# Patient Record
Sex: FEMALE | Race: WHITE | Employment: PART TIME | URBAN - METROPOLITAN AREA
[De-identification: names, ages, dates, MRNs, and addresses within clinical notes are randomized per-mention and may not be internally consistent; named-entity substitution may affect disease eponyms.]

---

## 2017-01-09 DIAGNOSIS — I10 ESSENTIAL HYPERTENSION: ICD-10-CM

## 2017-01-09 DIAGNOSIS — E03.9 ACQUIRED HYPOTHYROIDISM: ICD-10-CM

## 2017-01-09 RX ORDER — LISINOPRIL AND HYDROCHLOROTHIAZIDE 12.5; 1 MG/1; MG/1
TABLET ORAL
Qty: 90 TABLET | Refills: 2 | Status: SHIPPED | OUTPATIENT
Start: 2017-01-09 | End: 2017-07-24 | Stop reason: SDUPTHER

## 2017-01-09 RX ORDER — LEVOTHYROXINE SODIUM 0.1 MG/1
TABLET ORAL
Qty: 90 TABLET | Refills: 2 | Status: SHIPPED | OUTPATIENT
Start: 2017-01-09 | End: 2017-10-20 | Stop reason: SDUPTHER

## 2017-01-23 ENCOUNTER — OFFICE VISIT (OUTPATIENT)
Dept: INTERNAL MEDICINE CLINIC | Age: 62
End: 2017-01-23

## 2017-01-23 VITALS
SYSTOLIC BLOOD PRESSURE: 138 MMHG | WEIGHT: 167 LBS | RESPIRATION RATE: 12 BRPM | BODY MASS INDEX: 30.73 KG/M2 | HEART RATE: 58 BPM | HEIGHT: 62 IN | DIASTOLIC BLOOD PRESSURE: 78 MMHG

## 2017-01-23 DIAGNOSIS — M79.671 PAIN IN BOTH FEET: ICD-10-CM

## 2017-01-23 DIAGNOSIS — M79.672 PAIN IN BOTH FEET: ICD-10-CM

## 2017-01-23 DIAGNOSIS — I10 ESSENTIAL HYPERTENSION: Primary | ICD-10-CM

## 2017-01-23 DIAGNOSIS — E78.5 HYPERLIPIDEMIA, UNSPECIFIED HYPERLIPIDEMIA TYPE: ICD-10-CM

## 2017-01-23 DIAGNOSIS — E03.9 ACQUIRED HYPOTHYROIDISM: ICD-10-CM

## 2017-01-23 DIAGNOSIS — R73.03 PREDIABETES: ICD-10-CM

## 2017-01-23 PROCEDURE — 99214 OFFICE O/P EST MOD 30 MIN: CPT | Performed by: INTERNAL MEDICINE

## 2017-02-13 ENCOUNTER — OFFICE VISIT (OUTPATIENT)
Dept: DERMATOLOGY | Age: 62
End: 2017-02-13

## 2017-02-13 DIAGNOSIS — L73.8 SEBACEOUS GLAND HYPERPLASIA: ICD-10-CM

## 2017-02-13 DIAGNOSIS — L71.9 ROSACEA: Primary | ICD-10-CM

## 2017-02-13 DIAGNOSIS — Z87.2 HISTORY OF ACTINIC KERATOSIS: ICD-10-CM

## 2017-02-13 PROCEDURE — 99213 OFFICE O/P EST LOW 20 MIN: CPT | Performed by: DERMATOLOGY

## 2017-07-24 ENCOUNTER — OFFICE VISIT (OUTPATIENT)
Dept: INTERNAL MEDICINE CLINIC | Age: 62
End: 2017-07-24

## 2017-07-24 VITALS
HEART RATE: 59 BPM | HEIGHT: 62 IN | BODY MASS INDEX: 30.36 KG/M2 | SYSTOLIC BLOOD PRESSURE: 126 MMHG | RESPIRATION RATE: 12 BRPM | WEIGHT: 165 LBS | DIASTOLIC BLOOD PRESSURE: 74 MMHG

## 2017-07-24 DIAGNOSIS — E03.9 ACQUIRED HYPOTHYROIDISM: ICD-10-CM

## 2017-07-24 DIAGNOSIS — I10 ESSENTIAL HYPERTENSION: ICD-10-CM

## 2017-07-24 DIAGNOSIS — M85.80 OSTEOPENIA: ICD-10-CM

## 2017-07-24 DIAGNOSIS — R73.03 PREDIABETES: ICD-10-CM

## 2017-07-24 DIAGNOSIS — E78.5 HYPERLIPIDEMIA, UNSPECIFIED HYPERLIPIDEMIA TYPE: ICD-10-CM

## 2017-07-24 DIAGNOSIS — M25.561 CHRONIC PAIN OF RIGHT KNEE: ICD-10-CM

## 2017-07-24 DIAGNOSIS — I10 ESSENTIAL HYPERTENSION: Primary | ICD-10-CM

## 2017-07-24 DIAGNOSIS — G89.29 CHRONIC PAIN OF RIGHT KNEE: ICD-10-CM

## 2017-07-24 LAB
A/G RATIO: 1.9 (ref 1.1–2.2)
ALBUMIN SERPL-MCNC: 4.5 G/DL (ref 3.4–5)
ALP BLD-CCNC: 62 U/L (ref 40–129)
ALT SERPL-CCNC: 14 U/L (ref 10–40)
ANION GAP SERPL CALCULATED.3IONS-SCNC: 11 MMOL/L (ref 3–16)
AST SERPL-CCNC: 17 U/L (ref 15–37)
BILIRUB SERPL-MCNC: 0.8 MG/DL (ref 0–1)
BUN BLDV-MCNC: 15 MG/DL (ref 7–20)
CALCIUM SERPL-MCNC: 10 MG/DL (ref 8.3–10.6)
CHLORIDE BLD-SCNC: 101 MMOL/L (ref 99–110)
CHOLESTEROL, TOTAL: 218 MG/DL (ref 0–199)
CO2: 27 MMOL/L (ref 21–32)
CREAT SERPL-MCNC: 0.7 MG/DL (ref 0.6–1.2)
ESTIMATED AVERAGE GLUCOSE: 111.2 MG/DL
GFR AFRICAN AMERICAN: >60
GFR NON-AFRICAN AMERICAN: >60
GLOBULIN: 2.4 G/DL
GLUCOSE BLD-MCNC: 96 MG/DL (ref 70–99)
HBA1C MFR BLD: 5.5 %
HCT VFR BLD CALC: 39.5 % (ref 36–48)
HDLC SERPL-MCNC: 62 MG/DL (ref 40–60)
HEMOGLOBIN: 13.7 G/DL (ref 12–16)
LDL CHOLESTEROL CALCULATED: 131 MG/DL
MCH RBC QN AUTO: 31.9 PG (ref 26–34)
MCHC RBC AUTO-ENTMCNC: 34.7 G/DL (ref 31–36)
MCV RBC AUTO: 91.9 FL (ref 80–100)
PDW BLD-RTO: 12.8 % (ref 12.4–15.4)
PLATELET # BLD: 282 K/UL (ref 135–450)
PMV BLD AUTO: 7.9 FL (ref 5–10.5)
POTASSIUM SERPL-SCNC: 5.1 MMOL/L (ref 3.5–5.1)
RBC # BLD: 4.29 M/UL (ref 4–5.2)
SODIUM BLD-SCNC: 139 MMOL/L (ref 136–145)
TOTAL PROTEIN: 6.9 G/DL (ref 6.4–8.2)
TRIGL SERPL-MCNC: 125 MG/DL (ref 0–150)
TSH SERPL DL<=0.05 MIU/L-ACNC: 0.65 UIU/ML (ref 0.27–4.2)
VITAMIN D 25-HYDROXY: 27.8 NG/ML
VLDLC SERPL CALC-MCNC: 25 MG/DL
WBC # BLD: 7.4 K/UL (ref 4–11)

## 2017-07-24 PROCEDURE — 99214 OFFICE O/P EST MOD 30 MIN: CPT | Performed by: INTERNAL MEDICINE

## 2017-07-24 RX ORDER — LISINOPRIL AND HYDROCHLOROTHIAZIDE 12.5; 1 MG/1; MG/1
TABLET ORAL
Qty: 90 TABLET | Refills: 3 | Status: SHIPPED | OUTPATIENT
Start: 2017-07-24 | End: 2018-02-27 | Stop reason: SDUPTHER

## 2017-07-25 DIAGNOSIS — E78.5 HYPERLIPIDEMIA, UNSPECIFIED HYPERLIPIDEMIA TYPE: ICD-10-CM

## 2017-07-25 RX ORDER — MELATONIN
1 DAILY
Qty: 30 TABLET | Refills: 11 | COMMUNITY
Start: 2017-07-25 | End: 2018-02-27 | Stop reason: SDUPTHER

## 2017-09-14 ENCOUNTER — OFFICE VISIT (OUTPATIENT)
Dept: INTERNAL MEDICINE CLINIC | Age: 62
End: 2017-09-14

## 2017-09-14 ENCOUNTER — TELEPHONE (OUTPATIENT)
Dept: INTERNAL MEDICINE CLINIC | Age: 62
End: 2017-09-14

## 2017-09-14 VITALS
WEIGHT: 164 LBS | RESPIRATION RATE: 14 BRPM | HEART RATE: 67 BPM | DIASTOLIC BLOOD PRESSURE: 73 MMHG | BODY MASS INDEX: 30.49 KG/M2 | SYSTOLIC BLOOD PRESSURE: 128 MMHG

## 2017-09-14 DIAGNOSIS — R00.1 BRADYCARDIA: Primary | ICD-10-CM

## 2017-09-14 DIAGNOSIS — E03.9 ACQUIRED HYPOTHYROIDISM: ICD-10-CM

## 2017-09-14 DIAGNOSIS — I10 ESSENTIAL HYPERTENSION: ICD-10-CM

## 2017-09-14 DIAGNOSIS — I46.9 ASYSTOLE (HCC): ICD-10-CM

## 2017-09-14 PROCEDURE — 93000 ELECTROCARDIOGRAM COMPLETE: CPT | Performed by: INTERNAL MEDICINE

## 2017-09-14 PROCEDURE — 99214 OFFICE O/P EST MOD 30 MIN: CPT | Performed by: INTERNAL MEDICINE

## 2017-09-26 ENCOUNTER — HOSPITAL ENCOUNTER (OUTPATIENT)
Dept: NON INVASIVE DIAGNOSTICS | Age: 62
Discharge: HOME | End: 2017-09-26
Attending: INTERNAL MEDICINE | Admitting: INTERNAL MEDICINE

## 2017-09-26 DIAGNOSIS — R00.1 BRADYCARDIA: ICD-10-CM

## 2017-09-26 LAB
LV EF: 58 %
LVEF MODALITY: NORMAL

## 2017-09-29 LAB
ACQUISITION DURATION: NORMAL S
AVERAGE HEART RATE: 63 BPM
EKG DIAGNOSIS: NORMAL
FASTEST SUPRAVENTRICULAR RATE: 161 BPM
HOLTER MAX HEART RATE: 131 BPM
HOOKUP DATE: NORMAL
HOOKUP TIME: NORMAL
LONGEST SUPRAVENTRICULAR RATE: 161 BPM
Lab: NORMAL
MAX HEART RATE TIME/DATE: NORMAL
MIN HEART RATE TIME/DATE: NORMAL
MIN HEART RATE: 44 BPM
NUMBER OF FASTEST SUPRAVENTRICULAR BEATS: 7
NUMBER OF LONGEST SUPRAVENTRICULAR BEATS: 7
NUMBER OF QRS COMPLEXES: NORMAL
NUMBER OF SUPRAVENTRICULAR BEATS IN RUNS: 7
NUMBER OF SUPRAVENTRICULAR COUPLETS: 1
NUMBER OF SUPRAVENTRICULAR ECTOPICS: 38
NUMBER OF SUPRAVENTRICULAR ISOLATED BEATS: 29
NUMBER OF SUPRAVENTRICULAR RUNS: 1
NUMBER OF VENTRICULAR BEATS IN RUNS: 0
NUMBER OF VENTRICULAR BIGEMINAL CYCLES: 0
NUMBER OF VENTRICULAR COUPLETS: 0
NUMBER OF VENTRICULAR ECTOPICS: 3
NUMBER OF VENTRICULAR ISOLATED BEATS: 3
NUMBER OF VENTRICULAR RUNS: 0

## 2017-10-09 ENCOUNTER — OFFICE VISIT (OUTPATIENT)
Dept: CARDIOLOGY CLINIC | Age: 62
End: 2017-10-09

## 2017-10-09 VITALS
HEART RATE: 65 BPM | OXYGEN SATURATION: 95 % | BODY MASS INDEX: 31.04 KG/M2 | SYSTOLIC BLOOD PRESSURE: 165 MMHG | DIASTOLIC BLOOD PRESSURE: 80 MMHG | WEIGHT: 167 LBS

## 2017-10-09 DIAGNOSIS — R00.1 BRADYCARDIA: Primary | ICD-10-CM

## 2017-10-09 DIAGNOSIS — I10 ESSENTIAL HYPERTENSION: ICD-10-CM

## 2017-10-09 DIAGNOSIS — E78.5 HYPERLIPIDEMIA, UNSPECIFIED HYPERLIPIDEMIA TYPE: ICD-10-CM

## 2017-10-09 DIAGNOSIS — I49.1 PAC (PREMATURE ATRIAL CONTRACTION): ICD-10-CM

## 2017-10-09 DIAGNOSIS — I49.3 PVC (PREMATURE VENTRICULAR CONTRACTION): ICD-10-CM

## 2017-10-09 DIAGNOSIS — R94.31 ABNORMAL ECG: ICD-10-CM

## 2017-10-09 DIAGNOSIS — R00.2 PALPITATIONS: ICD-10-CM

## 2017-10-09 PROCEDURE — 99203 OFFICE O/P NEW LOW 30 MIN: CPT | Performed by: INTERNAL MEDICINE

## 2017-10-09 NOTE — PROGRESS NOTES
unsure type    Diabetes Sister      DM2     Review of Systems   General: No fevers, chills, fatigue, or night sweats. No abnormal changes in weight. HEENT: No blurry or decreased vision. No changes in hearing, nasal discharge or sore throat. Cardiovascular: See HPI. No cramping in legs or buttocks when walking. Respiratory: No cough, hemoptysis, or wheezing. No history of asthma. Gastrointestinal: No abdominal pain, hematochezia, melana, or history of GI ulcers. Genito-Urinary: No dysuria or hematuria. No urgency or polyuria. Musculoskeletal: No complaints of joint pain, joint swelling or muscular weakness/soreness. Neurological: No dizziness or headaches. No numbness/tingling, speech problems or weakness. No history of a stroke or TIA. Psychological: No new anxiety or depression  Hematological and Lymphatic: No abnormal bleeding or bruising, blood clots, jaundice. Endocrine: No malaise/lethargy, palpitations, polydipsia/polyuria, temperature intolerance or unexpected weight changes. Skin: No rashes or non-healing ulcers. PE:  Blood pressure (!) 165/80, pulse 65, weight 167 lb (75.8 kg), SpO2 95 %. General (appearance):  No distress. Well developed  Eyes: Anicteric  Ears/Nose/Mouth/Thorat: No cyanosis  CV: RRR. No m/r/g    Respiratory:  Lungs clear  GI: Abd soft, NT, ND. No peritoneal signs  Skin: Warm, dry. No rashes  Neuro/Psych: Alert and oriented x 3. Appropriate behavior  Ext:  No c/c/e  Pulses:  2+ bilaterally in radial and carotid.  No bruits    Lab Results   Component Value Date    WBC 7.4 07/24/2017    HGB 13.7 07/24/2017    HCT 39.5 07/24/2017    MCV 91.9 07/24/2017     07/24/2017       Chemistry        Component Value Date/Time     07/24/2017 0854    K 5.1 07/24/2017 0854     07/24/2017 0854    CO2 27 07/24/2017 0854    BUN 15 07/24/2017 0854    CREATININE 0.7 07/24/2017 0854        Component Value Date/Time    CALCIUM 10.0 07/24/2017 0854    ALKPHOS 62 07/24/2017 0854    AST 17 07/24/2017 0854    ALT 14 07/24/2017 0854    BILITOT 0.8 07/24/2017 0854        Lab Results   Component Value Date    TSH 0.65 07/24/2017     Studies reviewed:  10/2017 Holter. Sinus. HRs . PACs, PVCs. 7-beat run of svt. Palps noted with sinus rhythm, sinus felix, and pac couplet    2013 ecg: Sinus felix, LAD    9/2017 ECG: NSR, LAD, PRWP    9/2017 Echo: Normal EF. Mild AR and TR    A/P:  1. Bradycardia    2. Essential hypertension    3. Hyperlipidemia, unspecified hyperlipidemia type    4. PAC (premature atrial contraction)    5. PVC (premature ventricular contraction)    6. Palpitations    7. Abnormal ECG      Recs:  Risk for CV event < 7.5%. Will hold off on statin for now. Pt has normal HR in office. Has had bradycardic rhythm in past. Does get HRs to 100's as seen on Holter. No symptoms. Do not see indication for pacemaker at this time  F/U in 2 years. Would repeat echo then. Aba Dial MD, [facility]

## 2017-10-20 DIAGNOSIS — E03.9 ACQUIRED HYPOTHYROIDISM: ICD-10-CM

## 2017-10-20 RX ORDER — LEVOTHYROXINE SODIUM 0.1 MG/1
TABLET ORAL
Qty: 30 TABLET | Refills: 1 | Status: SHIPPED | OUTPATIENT
Start: 2017-10-20 | End: 2017-12-21 | Stop reason: SDUPTHER

## 2018-01-25 ENCOUNTER — OFFICE VISIT (OUTPATIENT)
Dept: INTERNAL MEDICINE CLINIC | Age: 63
End: 2018-01-25

## 2018-01-25 VITALS
WEIGHT: 165.2 LBS | DIASTOLIC BLOOD PRESSURE: 80 MMHG | SYSTOLIC BLOOD PRESSURE: 174 MMHG | HEART RATE: 48 BPM | OXYGEN SATURATION: 98 % | BODY MASS INDEX: 31.19 KG/M2 | HEIGHT: 61 IN

## 2018-01-25 DIAGNOSIS — R53.83 FATIGUE, UNSPECIFIED TYPE: ICD-10-CM

## 2018-01-25 DIAGNOSIS — Z78.0 POST-MENOPAUSAL: ICD-10-CM

## 2018-01-25 DIAGNOSIS — M17.0 PRIMARY OSTEOARTHRITIS OF BOTH KNEES: ICD-10-CM

## 2018-01-25 DIAGNOSIS — E03.9 ACQUIRED HYPOTHYROIDISM: ICD-10-CM

## 2018-01-25 DIAGNOSIS — R00.1 BRADYCARDIA: ICD-10-CM

## 2018-01-25 DIAGNOSIS — Z63.79 STRESSFUL LIFE EVENTS AFFECTING FAMILY AND HOUSEHOLD: ICD-10-CM

## 2018-01-25 DIAGNOSIS — I10 ESSENTIAL HYPERTENSION: Primary | ICD-10-CM

## 2018-01-25 DIAGNOSIS — R06.83 SNORING: ICD-10-CM

## 2018-01-25 PROCEDURE — 99215 OFFICE O/P EST HI 40 MIN: CPT | Performed by: INTERNAL MEDICINE

## 2018-01-25 RX ORDER — AMLODIPINE BESYLATE 5 MG/1
5 TABLET ORAL DAILY
Qty: 30 TABLET | Refills: 3 | Status: SHIPPED | OUTPATIENT
Start: 2018-01-25 | End: 2018-05-22 | Stop reason: SDUPTHER

## 2018-01-25 RX ORDER — LEVOTHYROXINE SODIUM 0.1 MG/1
TABLET ORAL
Qty: 30 TABLET | Refills: 5 | Status: SHIPPED | OUTPATIENT
Start: 2018-01-25 | End: 2018-06-23 | Stop reason: SDUPTHER

## 2018-01-25 ASSESSMENT — PATIENT HEALTH QUESTIONNAIRE - PHQ9
1. LITTLE INTEREST OR PLEASURE IN DOING THINGS: 0
2. FEELING DOWN, DEPRESSED OR HOPELESS: 0
SUM OF ALL RESPONSES TO PHQ9 QUESTIONS 1 & 2: 0
SUM OF ALL RESPONSES TO PHQ QUESTIONS 1-9: 0

## 2018-01-25 NOTE — PROGRESS NOTES
[facility]- Internal Medicine  Progress Note  Linda Solares. Jackelyn Cartwright MD, MPH     Assessment/Plan    Jewell Warren was seen today for 6 month follow-up. Diagnoses and all orders for this visit:  Essential hypertension  Systolic elevated- some life stress, not sleeping well, otherwise asymptomatic  Will add amlodpine 5 mg ( pt takes nsaids on occasion- so thghout this better option than increasing Ace inhib)  Pt advised to monitor BP at home 2 x/ week - info provided  CMP,TSH  Consider sleep eval    Acquired hypothyroidism  Sig bradycardia, asymptomatic  Recheck TSH  Cont levothyroxine 100 or adjust PRN    Snoring  advised to schedule sleep eval , jenny in presence of fatigue, daytime somnolence ( may be related to life stress as well)    Primary osteoarthritis of both knees  reviewed HEP- not currently interfering with exercise regimen  declines PT referral today  Will ref to ortho PRN instability symptoms  work on wt loss    Bradycardia  Seen by Dr Sergey Lopes, Holter reviewed, HR increases to 100  Pacemaker not indicated  Advised  follow up in 2 years, no changes  Pt remains asymptomatic    Stressful life events affecting family and household  Encouraged to schedule with Dr Bc Quick for life stress and insomnia            Post-menopausal  -     DEXA Bone Density Axial Skeleton; Future      Discussed medications with patient, who voiced understanding of their use and indications. All questions answered. Return in about 4 weeks (around 2/22/2018) for 30 minute.                  Mari Kenney   YOB: 1955    Date of Visit:  1/25/2018    Allergies   Allergen Reactions    Tussionex Pennkinetic Er [Hydrocod Polst-Cpm Polst Er]     Bactrim [Sulfamethoxazole-Trimethoprim] Rash    Doxycycline Hyclate Nausea And Vomiting    Jose-Tab [Erythromycin] Nausea And Vomiting     Outpatient Prescriptions Marked as Taking for the 1/25/18 encounter (Office Visit) with Jovita Moon MD   Medication Sig Dispense Refill    levothyroxine (SYNTHROID) 100 MCG tablet TAKE ONE TABLET BY MOUTH DAILY 30 tablet 0    Cholecalciferol (VITAMIN D3) 1000 units TABS Take 1 tablet by mouth daily 30 tablet 11    Glucos-Chond-Sterol-Fish Oil (GLUCOSAMINE CHONDROITIN PLUS PO) Take by mouth      lisinopril-hydrochlorothiazide (PRINZIDE;ZESTORETIC) 10-12.5 MG per tablet TAKE ONE TABLET BY MOUTH DAILY 90 tablet 3    aspirin 81 MG tablet Take 1 tablet by mouth daily 30 tablet 11    FISH OIL 1,000 mg by Does not apply route 2 times daily. Chief Complaint   Patient presents with    6 Month Follow-Up   per MA triage  HPI  Pt is here for follow up of chronic medical problems  CC:  Patient presents for follow-up of   1. Essential hypertension    2. Bradycardia    3. Primary osteoarthritis of both knees    4. Acquired hypothyroidism    5. Post-menopausal    6. Snoring    7. Fatigue, unspecified type    8. Stressful life events affecting family and household        Treatment Adherence:   Medication compliance:  compliant all of the time  Diet compliance:  no special diet- BRANDY usually  Weight trend: stable  Current exercise: aerobics 2 time(s) per week and weight training  4 time(s) per week  Barriers: none and sometimes knee pain. Hypertension:  Home blood pressure monitoring: No.  She is not adherent to a low sodium diet. Patient denies chest pain, shortness of breath, headache, lightheadedness, blurred vision, peripheral edema, palpitations, dry cough, orthopnea, PND and fatigue. Antihypertensive medication side effects: no medication side effects noted. Use of agents associated with hypertension: none.           Lab Results   Component Value Date    LABA1C 5.5 07/24/2017    LABA1C 5.4 09/16/2016    LABA1C 5.4 01/07/2016     Lab Results   Component Value Date    CREATININE 0.7 01/25/2018     Lab Results   Component Value Date    ALT 15 01/25/2018    AST 19 01/25/2018     Lab Results   Component Value Date    CHOL 218 (H) 07/24/2017    TRIG 125 07/24/2017    HDL 62 (H) 07/24/2017    LDLCALC 131 (H) 07/24/2017        Hypothyroidism: Recent symptoms: none. She denies weight gain, weight loss, cold intolerance, heat intolerance, hair loss, dry skin, constipation, diarrhea and edema. Patient is  taking her medication consistently on an empty stomach. No results found for: [facility]  Lab Results   Component Value Date    TSH 0.52 01/25/2018    TSH 0.65 07/24/2017    TSH 0.40 09/16/2016         Vit D, 25-Hydroxy (ng/mL)   Date Value   07/24/2017 27.8 (L)       Admits to life stress - youngest daughter may be autistic, depression, hi functioning autistic  Sister, age 79, may have alzheimer's , lives [facility]  Sleep- often awakes in the middle of the night,usually falls back to sleep but sometimes cannot. + snoring. No am HA. + daytime somnolence        Review of Systems    ROS:Comprehensive ROS negative except as per HPI      Vitals:    01/25/18 0802 01/25/18 0826 01/25/18 0827   BP: (!) 141/82 (!) 170/80 (!) 174/80   Site:  Right Arm Left Arm   Position:  Sitting Sitting   Cuff Size:  Large Adult Large Adult   Pulse: (!) 49 (!) 48 (!) 48   SpO2: 98%     Weight: 165 lb 3.2 oz (74.9 kg)     Height: 5' 1\" (1.549 m)       Body mass index is 31.21 kg/m². Wt Readings from Last 3 Encounters:   01/25/18 165 lb 3.2 oz (74.9 kg)   10/09/17 167 lb (75.8 kg)   09/14/17 164 lb (74.4 kg)     BP Readings from Last 3 Encounters:   01/25/18 (!) 174/80   10/09/17 (!) 165/80   09/14/17 128/73        HR 63, goes  up to 78 walking around room     Physical Exam   Constitutional: She is oriented to person, place, and time. She appears well-developed and well-nourished. No distress. HENT:   Head: Normocephalic and atraumatic. Eyes: Conjunctivae are normal. No scleral icterus. Neck: No JVD present. No tracheal deviation present. No thyromegaly present. Cardiovascular: Normal rate, regular rhythm, normal heart sounds and intact distal pulses.

## 2018-01-29 ENCOUNTER — TELEPHONE (OUTPATIENT)
Dept: INTERNAL MEDICINE CLINIC | Age: 63
End: 2018-01-29

## 2018-01-29 NOTE — TELEPHONE ENCOUNTER
I spoke with pt- she is ok to go to gym and walk on treadmill. Denies CP, Boudreaux, exertional dizziness.  Cont amlodipine 5 mg

## 2018-01-29 NOTE — TELEPHONE ENCOUNTER
Patient was seen last Thursday. Started her amlodipine 5 mg Friday. BP elevated to 190/80 after walking up stairs but then went back down to 142/80 shortly after. Better today. Patient would like to know if meds take a few days to kick in. Is afraid to work out since BP went up just from walking upstairs.

## 2018-02-27 ENCOUNTER — OFFICE VISIT (OUTPATIENT)
Dept: INTERNAL MEDICINE CLINIC | Age: 63
End: 2018-02-27

## 2018-02-27 VITALS
HEART RATE: 60 BPM | BODY MASS INDEX: 30.95 KG/M2 | WEIGHT: 163.8 LBS | RESPIRATION RATE: 12 BRPM | DIASTOLIC BLOOD PRESSURE: 69 MMHG | SYSTOLIC BLOOD PRESSURE: 133 MMHG

## 2018-02-27 DIAGNOSIS — I10 ESSENTIAL HYPERTENSION: Primary | ICD-10-CM

## 2018-02-27 PROCEDURE — 99213 OFFICE O/P EST LOW 20 MIN: CPT | Performed by: INTERNAL MEDICINE

## 2018-02-27 RX ORDER — LISINOPRIL AND HYDROCHLOROTHIAZIDE 12.5; 1 MG/1; MG/1
TABLET ORAL
Qty: 90 TABLET | Refills: 1 | Status: SHIPPED | OUTPATIENT
Start: 2018-02-27 | End: 2018-08-28 | Stop reason: SDUPTHER

## 2018-02-27 NOTE — PROGRESS NOTES
PROGRESS NOTE:    Jus Guerra    2/27/2018    Chief Complaint   Patient presents with    Follow-up     former Dr Katy Chavez pt     Hypertension     HPI:    (s)Mily Guerra presents to clinic today with issues noted above. Patient is taking BP medications at home without size effects, BP is being checked at home. She was placed on Norvasc and states it causes her to have flushing, \"face and ears turns red\", but she is tolerating it much better now. Patient denies chest pain, SOB, NVD, FC, rash, malaise, rigor, dizziness/lightheadness, other pertinent ROS was also reviewed. /69 (Site: Left Arm, Position: Sitting, Cuff Size: Large Adult)   Pulse 60   Resp 12   Wt 163 lb 12.8 oz (74.3 kg)   BMI 30.95 kg/m²   Body mass index is 30.95 kg/m². Physical Exam:    Gen: Patient appears well groomed, well appearing  HEAD: Atraumatic, normocephalic,   Eyes: PERRLA, EOMI   Neck: supple, no thyroid nodule appreciated, no JVD  Chest: Clear to auscultation SETH, unlabored breathing, normal expansion  Heart: Regular rate, regular rhythm, no murmur, no rub  Abdomen: Non-tender, non-distended, bowel sounds present x3  Extremities: no edema, distal pulses intact  Patient was alert and oriented to person, place and time    Jaimee King was seen today for follow-up and hypertension. Diagnoses and all orders for this visit:    Essential hypertension  Controlled overall, continue meds, follow renal function   -     lisinopril-hydrochlorothiazide (PRINZIDE;ZESTORETIC) 10-12.5 MG per tablet; TAKE ONE TABLET BY MOUTH DAILY    Preventive medicine: patient has had indicated immunizations. No orders of the defined types were placed in this encounter. Prior to Admission medications    Medication Sig Start Date End Date Taking?  Authorizing Provider   lisinopril-hydrochlorothiazide (PRINZIDE;ZESTORETIC) 10-12.5 MG per tablet TAKE ONE TABLET BY MOUTH DAILY 2/27/18  Yes Kyra Noland, DO   amLODIPine (NORVASC) 5 MG tablet Take 1 tablet by mouth daily 1/25/18  Yes Kindra Faye MD   levothyroxine (SYNTHROID) 100 MCG tablet TAKE ONE TABLET BY MOUTH DAILY 1/25/18  Yes Kindra Faye MD   Glucos-Chond-Sterol-Fish Oil (GLUCOSAMINE CHONDROITIN PLUS PO) Take by mouth   Yes Historical Provider, MD   aspirin 81 MG tablet Take 1 tablet by mouth daily 1/7/16  Yes Kindra Faye MD   Multiple Vitamins-Minerals (MULTIVITAMIN PO) Take  by mouth daily. Yes Historical Provider, MD   FISH OIL 1,000 mg by Does not apply route 2 times daily. Yes Historical Provider, MD   Calcium Carbonate-Vit D-Min (CALTRATE 600+D PLUS) 600-400 MG-UNIT TABS Take  by mouth. Indications: one by mouth twice a day   Yes Historical Provider, MD   Glucosamine-Chondroit-Vit C-Mn (GLUCOSAMINE 1500 COMPLEX PO) Take 1 capsule by mouth daily    Historical Provider, MD       Past Medical History:   Diagnosis Date    Bloodgood disease 4/24/2008    Endometrial polyp 12/2014    Hyperlipidemia 2/13/2013    Hypertension     pregnancy induced    Hypothyroidism     Lateral epicondylitis 7/1/2015    Plantar fasciitis of right foot 7/24/2013       Past Surgical History:   Procedure Laterality Date    BREAST BIOPSY  1987    Left    FINE NEEDLE ASPIRATION  1983    Left Breast    MOUTH SURGERY  1990s    jaw surgery    OVARIAN CYST SURGERY Left 12-27-13    TONSILLECTOMY         Social History   Substance Use Topics    Smoking status: Never Smoker    Smokeless tobacco: Never Used      Comment: 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  Alcohol use 0.0 oz/week      Comment: I drink only occasionally, less than once a month.        Family History   Problem Relation Age of Onset    Coronary Art Dis Father     Heart Failure Father      MI age 61    Cancer Paternal Aunt      Breast Cancer and Colon Cancer    Cancer Paternal Uncle      Colon Cancer    Cancer Maternal Grandmother      Breast Cancer    Cancer Paternal Grandmother      Breast    Osteoarthritis Maternal Grandfather      arthritis- unsure type    Diabetes Sister      DM2

## 2018-02-27 NOTE — PATIENT INSTRUCTIONS
servings of grains each day. A serving is 1 slice of bread, 1 ounce of dry cereal, or ½ cup of cooked rice, pasta, or cooked cereal. Try to choose whole-grain products as much as possible. · Limit lean meat, poultry, and fish to 2 servings each day. A serving is 3 ounces, about the size of a deck of cards. · Eat 4 to 5 servings of nuts, seeds, and legumes (cooked dried beans, lentils, and split peas) each week. A serving is 1/3 cup of nuts, 2 tablespoons of seeds, or ½ cup of cooked beans or peas. · Limit fats and oils to 2 to 3 servings each day. A serving is 1 teaspoon of vegetable oil or 2 tablespoons of salad dressing. · Limit sweets and added sugars to 5 servings or less a week. A serving is 1 tablespoon jelly or jam, ½ cup sorbet, or 1 cup of lemonade. · Eat less than 2,300 milligrams (mg) of sodium a day. If you limit your sodium to 1,500 mg a day, you can lower your blood pressure even more. Tips for success  · Start small. Do not try to make dramatic changes to your diet all at once. You might feel that you are missing out on your favorite foods and then be more likely to not follow the plan. Make small changes, and stick with them. Once those changes become habit, add a few more changes. · Try some of the following:  ¨ Make it a goal to eat a fruit or vegetable at every meal and at snacks. This will make it easy to get the recommended amount of fruits and vegetables each day. ¨ Try yogurt topped with fruit and nuts for a snack or healthy dessert. ¨ Add lettuce, tomato, cucumber, and onion to sandwiches. ¨ Combine a ready-made pizza crust with low-fat mozzarella cheese and lots of vegetable toppings. Try using tomatoes, squash, spinach, broccoli, carrots, cauliflower, and onions. ¨ Have a variety of cut-up vegetables with a low-fat dip as an appetizer instead of chips and dip. ¨ Sprinkle sunflower seeds or chopped almonds over salads.  Or try adding chopped walnuts or almonds to cooked

## 2018-03-15 ENCOUNTER — INITIAL CONSULT (OUTPATIENT)
Dept: PULMONOLOGY | Age: 63
End: 2018-03-15

## 2018-03-15 VITALS
OXYGEN SATURATION: 99 % | BODY MASS INDEX: 31.15 KG/M2 | WEIGHT: 165 LBS | HEART RATE: 68 BPM | DIASTOLIC BLOOD PRESSURE: 70 MMHG | HEIGHT: 61 IN | SYSTOLIC BLOOD PRESSURE: 132 MMHG

## 2018-03-15 DIAGNOSIS — R06.83 SNORING: ICD-10-CM

## 2018-03-15 DIAGNOSIS — G47.10 HYPERSOMNIA: Primary | ICD-10-CM

## 2018-03-15 DIAGNOSIS — I10 ESSENTIAL HYPERTENSION: Chronic | ICD-10-CM

## 2018-03-15 DIAGNOSIS — E66.9 NON MORBID OBESITY, UNSPECIFIED OBESITY TYPE: Chronic | ICD-10-CM

## 2018-03-15 DIAGNOSIS — E03.9 ACQUIRED HYPOTHYROIDISM: Chronic | ICD-10-CM

## 2018-03-15 PROCEDURE — 99244 OFF/OP CNSLTJ NEW/EST MOD 40: CPT | Performed by: INTERNAL MEDICINE

## 2018-03-15 ASSESSMENT — SLEEP AND FATIGUE QUESTIONNAIRES
HOW LIKELY ARE YOU TO NOD OFF OR FALL ASLEEP WHILE WATCHING TV: 3
HOW LIKELY ARE YOU TO NOD OFF OR FALL ASLEEP WHILE LYING DOWN TO REST IN THE AFTERNOON WHEN CIRCUMSTANCES PERMIT: 3
HOW LIKELY ARE YOU TO NOD OFF OR FALL ASLEEP IN A CAR, WHILE STOPPED FOR A FEW MINUTES IN TRAFFIC: 0
HOW LIKELY ARE YOU TO NOD OFF OR FALL ASLEEP WHILE SITTING AND TALKING TO SOMEONE: 1
NECK CIRCUMFERENCE (INCHES): 15
HOW LIKELY ARE YOU TO NOD OFF OR FALL ASLEEP WHEN YOU ARE A PASSENGER IN A CAR FOR AN HOUR WITHOUT A BREAK: 2
HOW LIKELY ARE YOU TO NOD OFF OR FALL ASLEEP WHILE SITTING INACTIVE IN A PUBLIC PLACE: 1
HOW LIKELY ARE YOU TO NOD OFF OR FALL ASLEEP WHILE SITTING AND READING: 3
ESS TOTAL SCORE: 16
HOW LIKELY ARE YOU TO NOD OFF OR FALL ASLEEP WHILE SITTING QUIETLY AFTER LUNCH WITHOUT ALCOHOL: 3

## 2018-03-15 ASSESSMENT — ENCOUNTER SYMPTOMS
VOMITING: 0
APNEA: 0
CHOKING: 0
ABDOMINAL DISTENTION: 0
NAUSEA: 0
ABDOMINAL PAIN: 0
RHINORRHEA: 0
PHOTOPHOBIA: 0
CHEST TIGHTNESS: 0
SHORTNESS OF BREATH: 0
EYE PAIN: 0
ALLERGIC/IMMUNOLOGIC NEGATIVE: 1

## 2018-03-29 ENCOUNTER — HOSPITAL ENCOUNTER (OUTPATIENT)
Dept: SLEEP MEDICINE | Age: 63
Discharge: HOME | End: 2018-03-30
Attending: INTERNAL MEDICINE | Admitting: INTERNAL MEDICINE

## 2018-03-29 DIAGNOSIS — R06.83 SNORING: ICD-10-CM

## 2018-03-29 DIAGNOSIS — G47.10 HYPERSOMNIA: ICD-10-CM

## 2018-03-29 PROCEDURE — 95806 SLEEP STUDY UNATT&RESP EFFT: CPT | Performed by: INTERNAL MEDICINE

## 2018-04-04 ENCOUNTER — TELEPHONE (OUTPATIENT)
Dept: SLEEP MEDICINE | Age: 63
End: 2018-04-04

## 2018-04-23 ENCOUNTER — TELEPHONE (OUTPATIENT)
Dept: SLEEP MEDICINE | Age: 63
End: 2018-04-23

## 2018-04-24 ENCOUNTER — TELEPHONE (OUTPATIENT)
Dept: SLEEP MEDICINE | Age: 63
End: 2018-04-24

## 2018-05-22 DIAGNOSIS — I10 ESSENTIAL HYPERTENSION: ICD-10-CM

## 2018-05-23 RX ORDER — AMLODIPINE BESYLATE 5 MG/1
TABLET ORAL
Qty: 30 TABLET | Refills: 2 | Status: SHIPPED | OUTPATIENT
Start: 2018-05-23 | End: 2018-06-25 | Stop reason: SDUPTHER

## 2018-05-29 ENCOUNTER — OFFICE VISIT (OUTPATIENT)
Dept: INTERNAL MEDICINE CLINIC | Age: 63
End: 2018-05-29

## 2018-05-29 VITALS
SYSTOLIC BLOOD PRESSURE: 122 MMHG | DIASTOLIC BLOOD PRESSURE: 74 MMHG | BODY MASS INDEX: 31.33 KG/M2 | HEART RATE: 67 BPM | OXYGEN SATURATION: 97 % | WEIGHT: 165.8 LBS

## 2018-05-29 DIAGNOSIS — I10 ESSENTIAL HYPERTENSION: ICD-10-CM

## 2018-05-29 DIAGNOSIS — Z23 NEED FOR PROPHYLACTIC VACCINATION AND INOCULATION AGAINST VARICELLA: Primary | ICD-10-CM

## 2018-05-29 DIAGNOSIS — E03.9 ACQUIRED HYPOTHYROIDISM: ICD-10-CM

## 2018-05-29 DIAGNOSIS — E55.9 HYPOVITAMINOSIS D: ICD-10-CM

## 2018-05-29 DIAGNOSIS — E78.5 HYPERLIPIDEMIA, UNSPECIFIED HYPERLIPIDEMIA TYPE: ICD-10-CM

## 2018-05-29 PROCEDURE — 99214 OFFICE O/P EST MOD 30 MIN: CPT | Performed by: INTERNAL MEDICINE

## 2018-06-23 DIAGNOSIS — E03.9 ACQUIRED HYPOTHYROIDISM: ICD-10-CM

## 2018-06-23 DIAGNOSIS — I10 ESSENTIAL HYPERTENSION: ICD-10-CM

## 2018-06-25 RX ORDER — AMLODIPINE BESYLATE 5 MG/1
TABLET ORAL
Qty: 30 TABLET | Refills: 2 | Status: SHIPPED | OUTPATIENT
Start: 2018-06-25 | End: 2018-11-15 | Stop reason: SDUPTHER

## 2018-06-25 RX ORDER — LEVOTHYROXINE SODIUM 0.1 MG/1
TABLET ORAL
Qty: 30 TABLET | Refills: 4 | Status: SHIPPED | OUTPATIENT
Start: 2018-06-25 | End: 2019-01-26 | Stop reason: SDUPTHER

## 2018-07-03 ENCOUNTER — OFFICE VISIT (OUTPATIENT)
Dept: SLEEP MEDICINE | Age: 63
End: 2018-07-03

## 2018-07-03 VITALS
HEART RATE: 60 BPM | BODY MASS INDEX: 31.45 KG/M2 | OXYGEN SATURATION: 98 % | WEIGHT: 166.6 LBS | HEIGHT: 61 IN | DIASTOLIC BLOOD PRESSURE: 72 MMHG | SYSTOLIC BLOOD PRESSURE: 134 MMHG

## 2018-07-03 DIAGNOSIS — E03.9 ACQUIRED HYPOTHYROIDISM: Chronic | ICD-10-CM

## 2018-07-03 DIAGNOSIS — E66.9 CLASS 1 OBESITY WITH BODY MASS INDEX (BMI) OF 31.0 TO 31.9 IN ADULT, UNSPECIFIED OBESITY TYPE, UNSPECIFIED WHETHER SERIOUS COMORBIDITY PRESENT: Chronic | ICD-10-CM

## 2018-07-03 DIAGNOSIS — G47.33 OBSTRUCTIVE SLEEP APNEA SYNDROME: Primary | ICD-10-CM

## 2018-07-03 DIAGNOSIS — I10 ESSENTIAL HYPERTENSION: Chronic | ICD-10-CM

## 2018-07-03 PROCEDURE — 99214 OFFICE O/P EST MOD 30 MIN: CPT | Performed by: NURSE PRACTITIONER

## 2018-07-03 ASSESSMENT — SLEEP AND FATIGUE QUESTIONNAIRES
HOW LIKELY ARE YOU TO NOD OFF OR FALL ASLEEP WHILE SITTING QUIETLY AFTER LUNCH WITHOUT ALCOHOL: 2
HOW LIKELY ARE YOU TO NOD OFF OR FALL ASLEEP WHILE SITTING AND READING: 3
HOW LIKELY ARE YOU TO NOD OFF OR FALL ASLEEP WHILE LYING DOWN TO REST IN THE AFTERNOON WHEN CIRCUMSTANCES PERMIT: 3
HOW LIKELY ARE YOU TO NOD OFF OR FALL ASLEEP WHEN YOU ARE A PASSENGER IN A CAR FOR AN HOUR WITHOUT A BREAK: 1
HOW LIKELY ARE YOU TO NOD OFF OR FALL ASLEEP WHILE SITTING AND TALKING TO SOMEONE: 1
HOW LIKELY ARE YOU TO NOD OFF OR FALL ASLEEP WHILE WATCHING TV: 2
HOW LIKELY ARE YOU TO NOD OFF OR FALL ASLEEP WHILE SITTING INACTIVE IN A PUBLIC PLACE: 1
ESS TOTAL SCORE: 13
HOW LIKELY ARE YOU TO NOD OFF OR FALL ASLEEP IN A CAR, WHILE STOPPED FOR A FEW MINUTES IN TRAFFIC: 0

## 2018-07-03 ASSESSMENT — ENCOUNTER SYMPTOMS
APNEA: 0
COUGH: 0
RHINORRHEA: 0
ABDOMINAL DISTENTION: 0
ABDOMINAL PAIN: 0
SHORTNESS OF BREATH: 0
SINUS PRESSURE: 0

## 2018-07-03 NOTE — PROGRESS NOTES
Ramo Bautista MD, FAASM, [facility]  Christian Joe, MSN, RN, CNP [address]  3rd Floor, Sleep [place], [address] [phone]   [address] [place] [address]  [address] [place] [address] Dr [address] . [address]oh[address] Persauda [address] [phone]       [facility] SLEEP MEDICINE    Subjective:     Patient ID: Leah Basilio is a 61 y.o. female. Chief Complaint   Patient presents with    Sleep Apnea       HPI:      Had study Insurance mandated HST done on 3/29/2018 which showed an RHI - 28.0/hr with Low SaO2 - 84% and time below 90% of 129min. This is consistent with moderate RAMANDEEP (327.23)    Machine Present in office today: Yes     Machine Modem/Download Info:  Compliance (hours/night): 6.5 hrs/night  Download AHI (/hour): 4 /HR  Average CPAP Pressure : 8.9 cmH2O           APAP - Settings  Pressure Min: 6 cmH2O  Pressure Max: 16 cmH2O                 Comfort Settings  Humidity Level (0-8): 3  Heated Tubing (Yes/No): Yes  Flex/EPR (0-3): 3 PAP Mask  Mask Type: Nasal mask  Mask Model: wisp  Mask Size: lg       Warren - Total score: 13    Follow-up :     Last Visit : March 2018    Per patient the listed Co-morbidities are well controlled and stable at this time. Subjective Health Changes: None       Follow-up :      Over Night Oximetry: [] Yes  [] No  [x] NA   Using O2: [] Yes  [] No  [x] NA   Patient is compliant with the machine  [x] Yes  [] No   Feeling rested when using the machine   [x] Yes  [] No     Pressure is comfortable with inspiration and expiration  [x] Yes  [] No     Noticed changes in pressure   [] Yes  [] No  [x] NA   Mask is fitting well  [x] Yes  [] No   Noting Mask Air Leak  [x] Yes  [] No   Having painful Aerophagia  [] Yes  [x] No   Nocturia   0-1  per night.    Having  HA upon waking  [] Yes  [x] No   Dry mouth upon waking   [] Yes  [x] No   Congestion upon waking   [] Yes  [x] No    Nose Bleeds  [] Yes  [x] No   Using Sleep Aides    [] NA   Understands how to change humidification and/or tubing temperature for comfort while at home  [x] Yes  [] No     Difficulties falling asleep  [] Yes  [x] No   Difficulties staying asleep  [] Yes  [x] No   Approximate time to bed  10-11pm   Approximate wake time  5am   Taking Naps  occasional   If taking naps usual length  <60 min    If taking naps using the machine  [] Yes  [x] No  [] NA   Drowsy when driving  [] Yes  [x] No     Does patient carry a DOT/CDL  [] Yes  [x] No     Does patient carry FAA/Pilots License   [] Yes  [x] No      Any concerns noted with the machine at this time  [] Yes  [x] No         Review of Systems   Constitutional: Negative for appetite change, chills, fatigue and fever. HENT: Negative for congestion, nosebleeds, rhinorrhea and sinus pressure. Respiratory: Negative for apnea, cough and shortness of breath. Cardiovascular: Negative for chest pain and palpitations. Gastrointestinal: Negative for abdominal distention and abdominal pain. Neurological: Negative for dizziness and headaches. 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  Alcohol use 0.0 oz/week      Comment: I drink only occasionally, less than once a month.  Drug use: No    Sexual activity: Yes     Partners: Male     Other Topics Concern    Not on file     Social History Narrative    Lives in [place] with . 3 daughters ages 22,19, 29       Prior to Admission medications    Medication Sig Start Date End Date Taking?  Authorizing Provider   levothyroxine (SYNTHROID) 100 MCG tablet TAKE ONE TABLET BY MOUTH DAILY 6/25/18  Yes Nicolas Novak, DO   amLODIPine (NORVASC) 5 MG tablet TAKE ONE TABLET BY MOUTH DAILY 6/25/18  Yes Verlee Sicard, DO   lisinopril-hydrochlorothiazide (PRINZIDE;ZESTORETIC) 10-12.5 MG per tablet TAKE ONE TABLET BY MOUTH DAILY 2/27/18  Yes Verlee Sicard, DO   Glucosamine-Chondroit-Vit C-Mn (GLUCOSAMINE 1500 COMPLEX PO) Take 1 capsule by mouth daily   Yes Historical Provider, MD   Glucos-Chond-Sterol-Fish Oil (GLUCOSAMINE CHONDROITIN PLUS PO) Take by mouth   Yes Historical Provider, MD   aspirin 81 MG tablet Take 1 tablet by mouth daily 1/7/16  Yes Vilma Benavidez MD   Multiple Vitamins-Minerals (MULTIVITAMIN PO) Take  by mouth daily. Yes Historical Provider, MD   FISH OIL 1,000 mg by Does not apply route 2 times daily. Yes Historical Provider, MD   Calcium Carbonate-Vit D-Min (CALTRATE 600+D PLUS) 600-400 MG-UNIT TABS Take  by mouth.  Indications: one by mouth twice a day   Yes Historical Provider, MD       Allergies as of 07/03/2018 - Review Complete 07/03/2018   Allergen Reaction Noted    Tussionex pennkinetic er [hydrocod polst-cpm polst er]  08/04/2010    Bactrim [sulfamethoxazole-trimethoprim] Rash 05/06/2014    Doxycycline hyclate Nausea And Vomiting     Jose-tab [erythromycin] Nausea And Vomiting 08/04/2010       Patient Active Problem List   Diagnosis    Essential hypertension    Acquired hypothyroidism    Hyperlipidemia    Knee pain, right    Skin lesion of face    Foot pain    DJD (degenerative joint disease) of knee    Menopause    Osteopenia    Prediabetes    Endometrial polyp    Goiter    Bradycardia    Asystole ([address]    PAC (premature atrial contraction)    PVC (premature ventricular contraction)    Palpitations    Abnormal ECG    Snoring    Primary osteoarthritis of both knees    Stressful life events affecting family and household    Obstructive sleep apnea syndrome    Hypovitaminosis D       Past Medical History:   Diagnosis Date    Bloodgood disease 4/24/2008    Endometrial polyp 12/2014    Hyperlipidemia 2/13/2013    Hypertension pregnancy induced    Hypothyroidism     Lateral epicondylitis 7/1/2015    Plantar fasciitis of right foot 7/24/2013       Past Surgical History:   Procedure Laterality Date    BREAST BIOPSY  1987    Left    FINE NEEDLE ASPIRATION  1983    Left Breast    MOUTH SURGERY  1990s    jaw surgery    OVARIAN CYST SURGERY Left 12-27-13    TONSILLECTOMY         Family History   Problem Relation Age of Onset    Coronary Art Dis Father     Heart Failure Father         MI age 58    Cancer Paternal Aunt         Breast Cancer and Colon Cancer    Cancer Paternal Uncle         Colon Cancer    Cancer Maternal Grandmother         Breast Cancer    Cancer Paternal Grandmother         Breast    Osteoarthritis Maternal Grandfather         arthritis- unsure type    Diabetes Sister         DM2       Vitals:  Weight BMI   Wt Readings from Last 3 Encounters:   07/03/18 166 lb 9.6 oz (75.6 kg)   05/29/18 165 lb 12.8 oz (75.2 kg)   03/15/18 165 lb (74.8 kg)    Body mass index is 31.48 kg/m². BP HR SaO2   BP Readings from Last 3 Encounters:   07/03/18 134/72   05/29/18 122/74   03/15/18 132/70    Pulse Readings from Last 3 Encounters:   07/03/18 60   05/29/18 67   03/15/18 68    SpO2 Readings from Last 3 Encounters:   07/03/18 98%   05/29/18 97%   03/15/18 99%        Assessment:     Visit Diagnoses and Associated Orders     Obstructive sleep apnea syndrome    -  Primary    new diagnosis with treatment          Essential hypertension   (Stable)           Acquired hypothyroidism   (Stable)           Class 1 obesity with body mass index (BMI) of 31.0 to 31.9 in adult, unspecified obesity type, unspecified whether serious comorbidity present   (Stable)                 The primary encounter diagnosis was Obstructive sleep apnea syndrome.  Diagnoses of Essential hypertension, Acquired hypothyroidism, and Class 1 obesity with body mass index (BMI) of 31.0 to 31.9 in adult, unspecified obesity type, unspecified whether serious

## 2018-07-26 ENCOUNTER — TELEPHONE (OUTPATIENT)
Dept: SLEEP MEDICINE | Age: 63
End: 2018-07-26

## 2018-08-28 DIAGNOSIS — I10 ESSENTIAL HYPERTENSION: ICD-10-CM

## 2018-08-28 RX ORDER — LISINOPRIL AND HYDROCHLOROTHIAZIDE 12.5; 1 MG/1; MG/1
TABLET ORAL
Qty: 30 TABLET | Refills: 2 | Status: SHIPPED | OUTPATIENT
Start: 2018-08-28 | End: 2019-05-26 | Stop reason: SDUPTHER

## 2018-09-12 ENCOUNTER — OFFICE VISIT (OUTPATIENT)
Dept: INTERNAL MEDICINE CLINIC | Age: 63
End: 2018-09-12

## 2018-09-12 VITALS
HEART RATE: 68 BPM | HEIGHT: 62 IN | SYSTOLIC BLOOD PRESSURE: 124 MMHG | RESPIRATION RATE: 16 BRPM | BODY MASS INDEX: 30.36 KG/M2 | TEMPERATURE: 98.4 F | WEIGHT: 165 LBS | DIASTOLIC BLOOD PRESSURE: 70 MMHG

## 2018-09-12 DIAGNOSIS — H65.03 BILATERAL ACUTE SEROUS OTITIS MEDIA, RECURRENCE NOT SPECIFIED: ICD-10-CM

## 2018-09-12 DIAGNOSIS — J06.9 VIRAL UPPER RESPIRATORY TRACT INFECTION: Primary | ICD-10-CM

## 2018-09-12 DIAGNOSIS — J45.991 COUGH VARIANT ASTHMA: ICD-10-CM

## 2018-09-12 PROCEDURE — 99213 OFFICE O/P EST LOW 20 MIN: CPT | Performed by: INTERNAL MEDICINE

## 2018-09-12 RX ORDER — PREDNISONE 10 MG/1
20 TABLET ORAL DAILY
Qty: 10 TABLET | Refills: 0 | Status: SHIPPED | OUTPATIENT
Start: 2018-09-12 | End: 2018-09-17

## 2018-09-12 RX ORDER — BENZONATATE 100 MG/1
100 CAPSULE ORAL 3 TIMES DAILY PRN
Qty: 30 CAPSULE | Refills: 0 | Status: SHIPPED | OUTPATIENT
Start: 2018-09-12 | End: 2018-09-19

## 2018-09-12 RX ORDER — CETIRIZINE HYDROCHLORIDE 10 MG/1
10 TABLET ORAL DAILY
Qty: 30 TABLET | Refills: 0 | Status: SHIPPED | OUTPATIENT
Start: 2018-09-12 | End: 2019-02-21 | Stop reason: SDUPTHER

## 2018-09-12 NOTE — PATIENT INSTRUCTIONS
Patient Education        Learning About Nebulizers  What is a nebulizer? A nebulizer is a tool that delivers liquid medicine as a fine mist. You breathe in the medicine through a mouthpiece or face mask. This sends the medicine directly to your airways and lungs. You breathe in the medicine for a few minutes. What is it used for? A nebulizer may be used to treat respiratory problems. These include asthma and chronic obstructive pulmonary disease (COPD). If you have asthma, it can be used with daily controller medicines or with quick-relief medicine during an attack or flare-up. A nebulizer can make inhaling medicines easier. It may be very helpful if it is hard for you to breathe or use an inhaler. How do you use a nebulizer? 1. Put the medicine into the medicine container. Be sure to measure the right amount. 2. Make sure that the container is connected to the mouthpiece or face mask. 3. Turn on the air compressor. 4. Take deep, slow breaths through the mouthpiece or mask. Hold each breath for about 2 seconds. 5. Continue breathing until the medicine is gone from the container. When the medicine is gone, there will be no more mist coming out. This may take about 10 minutes. 6. Shake the container to make sure you get all the medicine. Where can you learn more? Go to [email or web address]://REPP.PageStitch. org and sign in to your [employer] account. Enter Z877 in the [facility] box to learn more about \"Learning About Nebulizers. \"     If you do not have an account, please click on the \"Sign Up Now\" link. Current as of: December 6, 2017  Content Version: 11.7  © 7631-4578 [employer]. Care instructions adapted under license by [facility] ([facility]). If you have questions about a medical condition or this instruction, always ask your healthcare professional. [address] any warranty or liability for your use of this information.

## 2018-09-12 NOTE — PROGRESS NOTES
Progress Note:    Sofi Edwards    9/12/2018    Chief Complaint   Patient presents with    Cough     Started three days ago with cough, fever and chills, sore throat and nasal drainage. Mr(s)Mily Edwards has had 4 days of cough, fever, sore throat  Progression: sore throat better but drainage and cough worse  Quality: dry cough, congestion  Radiation: general and chest and ENT  Severity: moderate, cough is not keeping her up but it's annoying, no limiting  Timing: sudden onset  Moderation: taken ibuprofen and ASA without much improvement. Patient denies chest pain, SOB, NVD, rash, malaise, rigor, dizziness/lightheadness, other pertinent ROS was also reviewed. /70 (Site: Right Upper Arm, Position: Sitting, Cuff Size: Medium Adult)   Pulse 68 Comment: Regular. Temp 98.4 °F (36.9 °C) (Oral)   Resp 16   Ht 5' 1.5\" (1.562 m)   Wt 165 lb (74.8 kg)   BMI 30.67 kg/m²   Body mass index is 30.67 kg/m². Gen: Patient appears well groomed, well appearing  HEAD: Atraumatic, normocephalic,   Eyes: PERRLA, EOMI   Neck: supple, no thyroid nodule appreciated, no JVD  Chest: Clear to auscultation SETH, unlabored breathing, normal expansion  Heart: Regular rate, regular rhythm, no murmur, no rub  Abdomen: Non-tender, non-distended, bowel sounds present x3  Extremities: no edema, distal pulses intact  Patient was alert and oriented to person, place and time  ENT: External auricles intact, canal clear left, canal clear right, TM bulge left, TM bulge right, nasal turbinates edematous appearing, post pharynx erythematous, bilateral ear effusions noted    Bette Given was seen today for cough.     Diagnoses and all orders for this visit:    Viral upper respiratory tract infection  Non-toxic state, post-viral reactive airway cough, given Symbicort trial in office some relief, also take as needed tessalon perles, zyrtec    Bilateral serous otitis media (eustachian tube dysfunction)  Prednisone course,

## 2018-11-01 ENCOUNTER — TELEPHONE (OUTPATIENT)
Dept: CARDIOLOGY CLINIC | Age: 63
End: 2018-11-01

## 2018-11-15 DIAGNOSIS — I10 ESSENTIAL HYPERTENSION: ICD-10-CM

## 2018-11-15 RX ORDER — AMLODIPINE BESYLATE 5 MG/1
TABLET ORAL
Qty: 30 TABLET | Refills: 1 | Status: SHIPPED | OUTPATIENT
Start: 2018-11-15 | End: 2019-01-14 | Stop reason: SDUPTHER

## 2018-11-27 ENCOUNTER — OFFICE VISIT (OUTPATIENT)
Dept: INTERNAL MEDICINE CLINIC | Age: 63
End: 2018-11-27
Payer: COMMERCIAL

## 2018-11-27 VITALS
WEIGHT: 166 LBS | HEART RATE: 64 BPM | BODY MASS INDEX: 31.34 KG/M2 | RESPIRATION RATE: 16 BRPM | SYSTOLIC BLOOD PRESSURE: 134 MMHG | HEIGHT: 61 IN | DIASTOLIC BLOOD PRESSURE: 86 MMHG

## 2018-11-27 DIAGNOSIS — E03.9 ACQUIRED HYPOTHYROIDISM: ICD-10-CM

## 2018-11-27 DIAGNOSIS — M85.88 OSTEOPENIA OF LUMBAR SPINE: ICD-10-CM

## 2018-11-27 DIAGNOSIS — Z78.0 POSTMENOPAUSAL: ICD-10-CM

## 2018-11-27 DIAGNOSIS — E78.5 HYPERLIPIDEMIA, UNSPECIFIED HYPERLIPIDEMIA TYPE: ICD-10-CM

## 2018-11-27 DIAGNOSIS — M17.0 PRIMARY OSTEOARTHRITIS OF BOTH KNEES: Primary | ICD-10-CM

## 2018-11-27 DIAGNOSIS — I10 ESSENTIAL HYPERTENSION: ICD-10-CM

## 2018-11-27 PROCEDURE — 99214 OFFICE O/P EST MOD 30 MIN: CPT | Performed by: INTERNAL MEDICINE

## 2018-11-27 NOTE — PROGRESS NOTES
laxity/instability of R knee, will refer to sports med for considerations of synvisk vs. Surgical intervention  -     [facility]    Essential hypertension  Controlled overall, continue meds, follow renal function   -     COMPREHENSIVE METABOLIC PANEL; Future    Acquired hypothyroidism  Patient is taking her meds as prescribed, with at least 30 min prior to other foods/meds, not missing doses. -     TSH with Reflex; Future    Hyperlipidemia, unspecified hyperlipidemia type  Offset by high HDL, electively not on statin, diet mods    Postmenopausal  -     DEXA BONE DENSITY AXIAL SKELETON; Future    Osteopenia of lumbar spine  -     DEXA BONE DENSITY AXIAL SKELETON; Future    Preventive medicine: patient has agreed to indicated immunizations. Fluzone today, Cscope due 2019, PAP with GYN annually, needs Shingrix. Orders Placed This Encounter   Procedures    DEXA BONE DENSITY AXIAL SKELETON    COMPREHENSIVE METABOLIC PANEL    TSH with Reflex    [facility]       Prior to Admission medications    Medication Sig Start Date End Date Taking? Authorizing Provider   amLODIPine (NORVASC) 5 MG tablet TAKE ONE TABLET BY MOUTH DAILY 11/15/18  Yes Elray Clutter, DO   lisinopril-hydrochlorothiazide (PRINZIDE;ZESTORETIC) 10-12.5 MG per tablet TAKE ONE TABLET BY MOUTH DAILY 8/28/18  Yes Elray Clutter, DO   levothyroxine (SYNTHROID) 100 MCG tablet TAKE ONE TABLET BY MOUTH DAILY 6/25/18  Yes Elray Clutter, DO   Glucosamine-Chondroit-Vit C-Mn (GLUCOSAMINE 1500 COMPLEX PO) Take 1 capsule by mouth daily   Yes Historical Provider, MD   Multiple Vitamins-Minerals (MULTIVITAMIN PO) Take  by mouth daily. Yes Historical Provider, MD   FISH OIL 1,000 mg by Does not apply route 2 times daily. Yes Historical Provider, MD   Calcium Carbonate-Vit D-Min (CALTRATE 600+D PLUS) 600-400 MG-UNIT TABS Take  by mouth.  Indications: one by mouth twice a day

## 2018-12-19 ENCOUNTER — OFFICE VISIT (OUTPATIENT)
Dept: ORTHOPEDIC SURGERY | Age: 63
End: 2018-12-19
Payer: COMMERCIAL

## 2018-12-19 VITALS
BODY MASS INDEX: 31.34 KG/M2 | WEIGHT: 166.01 LBS | DIASTOLIC BLOOD PRESSURE: 73 MMHG | SYSTOLIC BLOOD PRESSURE: 137 MMHG | HEART RATE: 67 BPM | HEIGHT: 61 IN

## 2018-12-19 DIAGNOSIS — M17.12 ARTHRITIS OF LEFT KNEE: ICD-10-CM

## 2018-12-19 DIAGNOSIS — M25.562 LEFT KNEE PAIN, UNSPECIFIED CHRONICITY: ICD-10-CM

## 2018-12-19 DIAGNOSIS — M17.11 ARTHRITIS OF RIGHT KNEE: ICD-10-CM

## 2018-12-19 DIAGNOSIS — M25.561 RIGHT KNEE PAIN, UNSPECIFIED CHRONICITY: Primary | ICD-10-CM

## 2018-12-19 PROCEDURE — 99204 OFFICE O/P NEW MOD 45 MIN: CPT | Performed by: ORTHOPAEDIC SURGERY

## 2018-12-19 PROCEDURE — 20610 DRAIN/INJ JOINT/BURSA W/O US: CPT | Performed by: ORTHOPAEDIC SURGERY

## 2018-12-27 ENCOUNTER — HOSPITAL ENCOUNTER (OUTPATIENT)
Dept: PHYSICAL THERAPY | Age: 63
Setting detail: THERAPIES SERIES
Discharge: HOME | End: 2018-12-27
Payer: COMMERCIAL

## 2018-12-27 PROCEDURE — G8978 MOBILITY CURRENT STATUS: HCPCS | Performed by: PHYSICAL THERAPIST

## 2018-12-27 PROCEDURE — 97110 THERAPEUTIC EXERCISES: CPT | Performed by: PHYSICAL THERAPIST

## 2018-12-27 PROCEDURE — G8979 MOBILITY GOAL STATUS: HCPCS | Performed by: PHYSICAL THERAPIST

## 2018-12-27 PROCEDURE — 97161 PT EVAL LOW COMPLEX 20 MIN: CPT | Performed by: PHYSICAL THERAPIST

## 2018-12-27 NOTE — FLOWSHEET NOTE
diagnosis, prognosis and expectations for rehab  -pt. Provided with written and illustrated instructions for HEP  -all pt. Questions were answered  -discussed exercises and timing for gym    Therapeutic Exercise and NMR EXR  [x] (69066) Provided verbal/tactile cueing for activities related to strengthening, flexibility, endurance, ROM for improvements in LE, proximal hip, and core control with self care, mobility, lifting, ambulation.  [] (59940) Provided verbal/tactile cueing for activities related to improving balance, coordination, kinesthetic sense, posture, motor skill, proprioception  to assist with LE, proximal hip, and core control in self care, mobility, lifting, ambulation and eccentric single leg control.      NMR and Therapeutic Activities:    [] (15506 or 25755) Provided verbal/tactile cueing for activities related to improving balance, coordination, kinesthetic sense, posture, motor skill, proprioception and motor activation to allow for proper function of core, proximal hip and LE with self care and ADLs  [] (95886) Gait Re-education- Provided training and instruction to the patient for proper LE, core and proximal hip recruitment and positioning and eccentric body weight control with ambulation re-education including up and down stairs     Home Exercise Program:    [x] (44373) Reviewed/Progressed HEP activities related to strengthening, flexibility, endurance, ROM of core, proximal hip and LE for functional self-care, mobility, lifting and ambulation/stair navigation   [] (87675)Reviewed/Progressed HEP activities related to improving balance, coordination, kinesthetic sense, posture, motor skill, proprioception of core, proximal hip and LE for self care, mobility, lifting, and ambulation/stair navigation      Manual Treatments:  PROM / STM / Oscillations-Mobs:  G-I, II, III, IV (PA's, Inf., Post.)  [] (54160) Provided manual therapy to mobilize LE, proximal hip and/or LS spine soft tissue/joints for without increased symptoms or restriction. 5. Patient will transition to independent gym program that is pain free in order to continue to progress strength. Progression Towards Functional goals:  [] Patient is progressing as expected towards functional goals listed. [] Progression is slowed due to complexities listed. [] Progression has been slowed due to co-morbidities.   [x] Plan just implemented, too soon to assess goals progression  [] Other:     Persisting Functional Limitations/Impairments:  []Sitting [x]Standing   [x]Walking [x]Squatting/bending    [x]Stairs [x]ADL's    [x]Transfers []Reaching  [x]Housework [x]Job related tasks  []Driving [x]Sports/Recreation   []Other:    ASSESSMENT:  See eval  Treatment/Activity Tolerance:  [x] Patient tolerated treatment well [] Patient limited by fatique  [] Patient limited by pain  [] Patient limited by other medical complications  [] Other:     Prognosis: [x] Good [] Fair  [] Poor    Patient Requires Follow-up: [x] Yes  [] No    Return to Play:    [x]  N/A   []  Stage 1: Intro to Strength   []  Stage 2: Return to Run and Strength   []  Stage 3: Return to Jump and Strength   []  Stage 4: Dynamic Strength and Agility   []  Stage 5: Sport Specific Training     []  Ready to Return to Play, Meets All Above Stages   []  Not Ready for Return to Sports   Comments:            PLAN: See eval  [] Continue per plan of care [] Alter current plan (see comments)  [x] Plan of care initiated [] Hold pending MD visit [] Discharge    Electronically signed by: Shannan Keller PT, DPT

## 2018-12-27 NOTE — PLAN OF CARE
periods/distances/surfaces   [x]Reduced ability to ascend/descend stairs   [x]Reduced ability to run, hop, cut or jump   []other:    Participation Restrictions   []Reduced participation in self care activities   [x]Reduced participation in home management activities   [x]Reduced participation in work activities   [x]Reduced participation in social activities. [x]Reduced participation in sport/recreation activities. Classification :    []Signs/symptoms consistent with post-surgical status including decreased ROM, strength and function.    []Signs/symptoms consistent with joint sprain/strain   []Signs/symptoms consistent with patella-femoral syndrome   [x]Signs/symptoms consistent with knee OA/hip OA   []Signs/symptoms consistent with internal derangement of knee/Hip   []Signs/symptoms consistent with functional hip weakness/NMR control      []Signs/symptoms consistent with tendinitis/tendinosis    []signs/symptoms consistent with pathology which may benefit from Dry needling      []other:      Prognosis/Rehab Potential:      []Excellent   [x]Good    []Fair   []Poor    Tolerance of evaluation/treatment:    []Excellent   [x]Good    []Fair   []Poor    Physical Therapy Evaluation Complexity Justification  [x] A history of present problem with:  [] no personal factors and/or comorbidities that impact the plan of care;  [x]1-2 personal factors and/or comorbidities that impact the plan of care  []3 personal factors and/or comorbidities that impact the plan of care  [x] An examination of body systems using standardized tests and measures addressing any of the following: body structures and functions (impairments), activity limitations, and/or participation restrictions;:  [] a total of 1-2 or more elements   [] a total of 3 or more elements   [x] a total of 4 or more elements   [x] A clinical presentation with:  [x] stable and/or uncomplicated characteristics   [] evolving clinical presentation with changing characteristics  [] unstable and unpredictable characteristics;   [x] Clinical decision making of [x] low, [] moderate, [] high complexity using standardized patient assessment instrument and/or measurable assessment of functional outcome. [x] EVAL (LOW) 03554 (typically 15 minutes face-to-face)  [] EVAL (MOD) 96252 (typically 30 minutes face-to-face)  [] EVAL (HIGH) 60348 (typically 45 minutes face-to-face)  [] RE-EVAL     PLAN:   Frequency/Duration:  1 days per week for 4-6 Weeks:  Interventions:  [x]  Therapeutic exercise including: strength training, ROM, for Lower extremity and core   [x]  NMR activation and proprioception for LE, Glutes and Core   [x]  Manual therapy as indicated for LE, Hip and spine to include: Dry Needling/IASTM, STM, PROM, Gr I-IV mobilizations, manipulation. [x] Modalities as needed that may include: thermal agents, E-stim, Biofeedback, US, iontophoresis as indicated  [x] Patient education on joint protection, postural re-education, activity modification, progression of HEP. HEP instruction: Pt. Provided with written and illustrated instructions for home program. Pt to perform exercises 1-2x day f/b ice 15 min. (see scanned forms)    GOALS:  Patient stated goal: decrease pain    Therapist goals for Patient:   Short Term Goals: To be achieved in: 2 weeks  1. Independent in HEP and progression per patient tolerance, in order to prevent re-injury. 2. Patient will have a decrease in pain to facilitate improvement in movement, function, and ADLs as indicated by Functional Deficits. Long Term Goals: To be achieved in: 4-6 weeks  1. Disability index score of 25% or less for the LEFS to assist with reaching prior level of function. 2. Patient will demonstrate increased LE flexibility to max potential to allow for proper joint functioning as indicated by patients Functional Deficits.    3. Patient will demonstrate an increase in Strength to at least 4+/5 throughout  as well as good

## 2019-01-02 ENCOUNTER — HOSPITAL ENCOUNTER (OUTPATIENT)
Dept: PHYSICAL THERAPY | Age: 64
Setting detail: THERAPIES SERIES
Discharge: HOME | End: 2019-01-02
Payer: COMMERCIAL

## 2019-01-02 PROCEDURE — 97110 THERAPEUTIC EXERCISES: CPT | Performed by: PHYSICAL THERAPIST

## 2019-01-07 ENCOUNTER — OFFICE VISIT (OUTPATIENT)
Dept: PULMONOLOGY | Age: 64
End: 2019-01-07
Payer: COMMERCIAL

## 2019-01-07 VITALS
WEIGHT: 166 LBS | OXYGEN SATURATION: 97 % | DIASTOLIC BLOOD PRESSURE: 80 MMHG | BODY MASS INDEX: 30.55 KG/M2 | HEIGHT: 62 IN | SYSTOLIC BLOOD PRESSURE: 138 MMHG | HEART RATE: 57 BPM

## 2019-01-07 DIAGNOSIS — G47.33 OBSTRUCTIVE SLEEP APNEA SYNDROME: Primary | ICD-10-CM

## 2019-01-07 DIAGNOSIS — I10 ESSENTIAL HYPERTENSION: ICD-10-CM

## 2019-01-07 DIAGNOSIS — R00.1 BRADYCARDIA: ICD-10-CM

## 2019-01-07 PROCEDURE — 99214 OFFICE O/P EST MOD 30 MIN: CPT | Performed by: NURSE PRACTITIONER

## 2019-01-07 ASSESSMENT — SLEEP AND FATIGUE QUESTIONNAIRES
HOW LIKELY ARE YOU TO NOD OFF OR FALL ASLEEP WHILE SITTING QUIETLY AFTER LUNCH WITHOUT ALCOHOL: 2
HOW LIKELY ARE YOU TO NOD OFF OR FALL ASLEEP WHEN YOU ARE A PASSENGER IN A CAR FOR AN HOUR WITHOUT A BREAK: 1
HOW LIKELY ARE YOU TO NOD OFF OR FALL ASLEEP WHILE SITTING AND TALKING TO SOMEONE: 0
HOW LIKELY ARE YOU TO NOD OFF OR FALL ASLEEP IN A CAR, WHILE STOPPED FOR A FEW MINUTES IN TRAFFIC: 0
ESS TOTAL SCORE: 9
HOW LIKELY ARE YOU TO NOD OFF OR FALL ASLEEP WHILE SITTING AND READING: 1
HOW LIKELY ARE YOU TO NOD OFF OR FALL ASLEEP WHILE SITTING INACTIVE IN A PUBLIC PLACE: 1
HOW LIKELY ARE YOU TO NOD OFF OR FALL ASLEEP WHILE WATCHING TV: 2
HOW LIKELY ARE YOU TO NOD OFF OR FALL ASLEEP WHILE LYING DOWN TO REST IN THE AFTERNOON WHEN CIRCUMSTANCES PERMIT: 2

## 2019-01-07 ASSESSMENT — ENCOUNTER SYMPTOMS
APNEA: 0
COUGH: 0
EYE REDNESS: 0
RHINORRHEA: 0
SINUS PRESSURE: 0
ABDOMINAL PAIN: 0
ABDOMINAL DISTENTION: 0
SHORTNESS OF BREATH: 0
EYE PAIN: 0

## 2019-01-08 ENCOUNTER — APPOINTMENT (OUTPATIENT)
Dept: PHYSICAL THERAPY | Age: 64
End: 2019-01-08
Payer: COMMERCIAL

## 2019-01-09 ENCOUNTER — OFFICE VISIT (OUTPATIENT)
Dept: ORTHOPEDIC SURGERY | Age: 64
End: 2019-01-09
Payer: COMMERCIAL

## 2019-01-09 VITALS — HEIGHT: 61 IN | WEIGHT: 166.01 LBS | BODY MASS INDEX: 31.34 KG/M2

## 2019-01-09 DIAGNOSIS — M17.11 ARTHRITIS OF RIGHT KNEE: ICD-10-CM

## 2019-01-09 DIAGNOSIS — M17.12 ARTHRITIS OF LEFT KNEE: Primary | ICD-10-CM

## 2019-01-09 PROCEDURE — 99213 OFFICE O/P EST LOW 20 MIN: CPT | Performed by: ORTHOPAEDIC SURGERY

## 2019-01-11 ENCOUNTER — HOSPITAL ENCOUNTER (OUTPATIENT)
Dept: PHYSICAL THERAPY | Age: 64
Setting detail: THERAPIES SERIES
Discharge: HOME | End: 2019-01-11
Payer: COMMERCIAL

## 2019-01-11 PROCEDURE — G8979 MOBILITY GOAL STATUS: HCPCS | Performed by: PHYSICAL THERAPIST

## 2019-01-11 PROCEDURE — G8978 MOBILITY CURRENT STATUS: HCPCS | Performed by: PHYSICAL THERAPIST

## 2019-01-11 PROCEDURE — 97110 THERAPEUTIC EXERCISES: CPT | Performed by: PHYSICAL THERAPIST

## 2019-01-11 PROCEDURE — G8980 MOBILITY D/C STATUS: HCPCS | Performed by: PHYSICAL THERAPIST

## 2019-01-26 DIAGNOSIS — E03.9 ACQUIRED HYPOTHYROIDISM: ICD-10-CM

## 2019-01-29 RX ORDER — LEVOTHYROXINE SODIUM 0.1 MG/1
TABLET ORAL
Qty: 30 TABLET | Refills: 3 | Status: SHIPPED | OUTPATIENT
Start: 2019-01-29 | End: 2019-05-29 | Stop reason: SDUPTHER

## 2019-02-21 ENCOUNTER — OFFICE VISIT (OUTPATIENT)
Dept: INTERNAL MEDICINE CLINIC | Age: 64
End: 2019-02-21
Payer: COMMERCIAL

## 2019-02-21 VITALS
TEMPERATURE: 97.4 F | SYSTOLIC BLOOD PRESSURE: 136 MMHG | HEART RATE: 76 BPM | BODY MASS INDEX: 31.05 KG/M2 | WEIGHT: 167 LBS | RESPIRATION RATE: 16 BRPM | DIASTOLIC BLOOD PRESSURE: 70 MMHG

## 2019-02-21 DIAGNOSIS — H01.00B BLEPHARITIS OF UPPER AND LOWER EYELIDS OF BOTH EYES, UNSPECIFIED TYPE: Primary | ICD-10-CM

## 2019-02-21 DIAGNOSIS — H01.00A BLEPHARITIS OF UPPER AND LOWER EYELIDS OF BOTH EYES, UNSPECIFIED TYPE: Primary | ICD-10-CM

## 2019-02-21 PROCEDURE — 99213 OFFICE O/P EST LOW 20 MIN: CPT | Performed by: INTERNAL MEDICINE

## 2019-02-21 RX ORDER — CETIRIZINE HYDROCHLORIDE 10 MG/1
10 TABLET ORAL DAILY
Qty: 30 TABLET | Refills: 0 | Status: SHIPPED | OUTPATIENT
Start: 2019-02-21 | End: 2019-07-10

## 2019-02-21 RX ORDER — NEOMYCIN SULFATE, POLYMYXIN B SULFATE AND DEXAMETHASONE 3.5; 10000; 1 MG/ML; [USP'U]/ML; MG/ML
1 SUSPENSION/ DROPS OPHTHALMIC 3 TIMES DAILY
Qty: 1 BOTTLE | Refills: 0 | Status: SHIPPED | OUTPATIENT
Start: 2019-02-21 | End: 2019-02-26

## 2019-02-21 RX ORDER — PREDNISONE 10 MG/1
20 TABLET ORAL DAILY
Qty: 10 TABLET | Refills: 0 | Status: SHIPPED | OUTPATIENT
Start: 2019-02-21 | End: 2019-02-26

## 2019-02-21 ASSESSMENT — PATIENT HEALTH QUESTIONNAIRE - PHQ9
SUM OF ALL RESPONSES TO PHQ QUESTIONS 1-9: 0
1. LITTLE INTEREST OR PLEASURE IN DOING THINGS: 0
SUM OF ALL RESPONSES TO PHQ QUESTIONS 1-9: 0
SUM OF ALL RESPONSES TO PHQ9 QUESTIONS 1 & 2: 0
2. FEELING DOWN, DEPRESSED OR HOPELESS: 0

## 2019-03-12 ENCOUNTER — TELEPHONE (OUTPATIENT)
Dept: INTERNAL MEDICINE CLINIC | Age: 64
End: 2019-03-12

## 2019-03-14 ENCOUNTER — TELEPHONE (OUTPATIENT)
Dept: INTERNAL MEDICINE CLINIC | Age: 64
End: 2019-03-14

## 2019-03-14 DIAGNOSIS — H01.00A BLEPHARITIS OF UPPER AND LOWER EYELIDS OF BOTH EYES, UNSPECIFIED TYPE: Primary | ICD-10-CM

## 2019-03-14 DIAGNOSIS — H01.00B BLEPHARITIS OF UPPER AND LOWER EYELIDS OF BOTH EYES, UNSPECIFIED TYPE: Primary | ICD-10-CM

## 2019-03-14 RX ORDER — AZELASTINE HYDROCHLORIDE 0.5 MG/ML
1 SOLUTION/ DROPS OPHTHALMIC 2 TIMES DAILY
Qty: 1 BOTTLE | Refills: 0 | Status: SHIPPED | OUTPATIENT
Start: 2019-03-14 | End: 2019-04-13

## 2019-04-18 DIAGNOSIS — I10 ESSENTIAL HYPERTENSION: ICD-10-CM

## 2019-04-19 RX ORDER — AMLODIPINE BESYLATE 5 MG/1
TABLET ORAL
Qty: 30 TABLET | Refills: 1 | Status: SHIPPED | OUTPATIENT
Start: 2019-04-19 | End: 2019-06-20 | Stop reason: SDUPTHER

## 2019-05-21 ENCOUNTER — OFFICE VISIT (OUTPATIENT)
Dept: ORTHOPEDIC SURGERY | Age: 64
End: 2019-05-21
Payer: COMMERCIAL

## 2019-05-21 VITALS
WEIGHT: 162 LBS | HEIGHT: 61 IN | BODY MASS INDEX: 30.58 KG/M2 | HEART RATE: 57 BPM | DIASTOLIC BLOOD PRESSURE: 82 MMHG | SYSTOLIC BLOOD PRESSURE: 131 MMHG

## 2019-05-21 DIAGNOSIS — M17.11 OSTEOARTHRITIS OF RIGHT KNEE, UNSPECIFIED OSTEOARTHRITIS TYPE: Primary | ICD-10-CM

## 2019-05-21 PROCEDURE — 99213 OFFICE O/P EST LOW 20 MIN: CPT | Performed by: ORTHOPAEDIC SURGERY

## 2019-05-21 ASSESSMENT — ENCOUNTER SYMPTOMS
ALLERGIC/IMMUNOLOGIC NEGATIVE: 1
RESPIRATORY NEGATIVE: 1
EYES NEGATIVE: 1
GASTROINTESTINAL NEGATIVE: 1

## 2019-05-21 NOTE — PROGRESS NOTES
[facility]  History and Physical  Knee Pain    Date:  2019    Name:  Marcy Zazueta  Address:  [place] [address]    :  1955      Age:   59 y.o.    SSN:  [id number]      Medical Record Number:  [id number]    Reason for Visit:    New Patient (bilateral knee )      HPI:   Marcy Zazueta is a healthy and well appearing 59y.o. year old female who presents to our office today complaining of  bilateral knee pain. She reports a large right is worse than the left. She's been having pain for many months and is described as sharp and aching pain. She had no prior injury. She describes ice pick like pain on the inside of her knees especially with standing or walking long distances. She also has problems going up and down stairs. She was seen by Dr. Helen Pedroza before who offered her injections. Injection on her left knee lasted her a good amount of time however the one on the right only lasted her about a week. She is at a point where she is no longer wanting to deal with her pain related to knee arthritis. She is requesting something be done more definitive. She is failed conservative treatment measures including therapy, anti-inflammatories, activity modification, and injections. Review of Systems:  A 14 point review of systems available in the scanned medical record under the media tab, as documented by the patient. The review is negative with the exception of those things mentioned in the HPI and Past Medical History.       Past History:  Past Medical History:   Diagnosis Date    Bloodgood disease 2008    Endometrial polyp 2014    Hyperlipidemia 2013    Hypertension     pregnancy induced    Hypothyroidism     Lateral epicondylitis 2015    Plantar fasciitis of right foot 2013     Past Surgical History:   Procedure Laterality Date    BREAST BIOPSY  1987    Left    FINE NEEDLE ASPIRATION  1983 Left Breast    MOUTH SURGERY  1990s    jaw surgery    OVARIAN CYST SURGERY Left 12-27-13    TONSILLECTOMY       Current Outpatient Medications on File Prior to Visit   Medication Sig Dispense Refill    amLODIPine (NORVASC) 5 MG tablet TAKE ONE TABLET BY MOUTH DAILY 30 tablet 1    cetirizine (ZYRTEC) 10 MG tablet Take 1 tablet by mouth daily 30 tablet 0    levothyroxine (SYNTHROID) 100 MCG tablet TAKE ONE TABLET BY MOUTH DAILY 30 tablet 3    lisinopril-hydrochlorothiazide (PRINZIDE;ZESTORETIC) 10-12.5 MG per tablet TAKE ONE TABLET BY MOUTH DAILY 30 tablet 2    Glucosamine-Chondroit-Vit C-Mn (GLUCOSAMINE 1500 COMPLEX PO) Take 1 capsule by mouth daily      aspirin 81 MG tablet Take 1 tablet by mouth daily 30 tablet 11    Multiple Vitamins-Minerals (MULTIVITAMIN PO) Take  by mouth daily.  FISH OIL 1,000 mg by Does not apply route 2 times daily.  Calcium Carbonate-Vit D-Min (CALTRATE 600+D PLUS) 600-400 MG-UNIT TABS Take  by mouth. Indications: one by mouth twice a day       No current facility-administered medications on file prior to visit. 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 Substance and Sexual Activity    Alcohol use: Yes     Alcohol/week: 0.0 oz     Comment: I drink only occasionally, less than once a month.     Drug use: No    Sexual activity: Yes     Partners: Male   Lifestyle    Physical activity: Days per week: Not on file     Minutes per session: Not on file    Stress: Not on file   Relationships    Social connections:     Talks on phone: Not on file     Gets together: Not on file     Attends Advent service: Not on file     Active member of club or organization: Not on file     Attends meetings of clubs or organizations: Not on file     Relationship status: Not on file    Intimate partner violence:     Fear of current or ex partner: Not on file     Emotionally abused: Not on file     Physically abused: Not on file     Forced sexual activity: Not on file   Other Topics Concern    Not on file   Social History Narrative    Lives in [place] with . 3 daughters ages 22,19, 29     Family History   Problem Relation Age of Onset    Coronary Art Dis Father     Heart Failure Father         MI age 61    Cancer Paternal Aunt         Breast Cancer and Colon Cancer    Cancer Paternal Uncle         Colon Cancer    Cancer Maternal Grandmother         Breast Cancer    Cancer Paternal Grandmother         Breast    Osteoarthritis Maternal Grandfather         arthritis- unsure type    Diabetes Sister         DM2       Current Medications:    Current Outpatient Medications   Medication Sig Dispense Refill    amLODIPine (NORVASC) 5 MG tablet TAKE ONE TABLET BY MOUTH DAILY 30 tablet 1    cetirizine (ZYRTEC) 10 MG tablet Take 1 tablet by mouth daily 30 tablet 0    levothyroxine (SYNTHROID) 100 MCG tablet TAKE ONE TABLET BY MOUTH DAILY 30 tablet 3    lisinopril-hydrochlorothiazide (PRINZIDE;ZESTORETIC) 10-12.5 MG per tablet TAKE ONE TABLET BY MOUTH DAILY 30 tablet 2    Glucosamine-Chondroit-Vit C-Mn (GLUCOSAMINE 1500 COMPLEX PO) Take 1 capsule by mouth daily      aspirin 81 MG tablet Take 1 tablet by mouth daily 30 tablet 11    Multiple Vitamins-Minerals (MULTIVITAMIN PO) Take  by mouth daily.  FISH OIL 1,000 mg by Does not apply route 2 times daily.       Calcium Carbonate-Vit D-Min Protein 11/27/2018 7.6     Alb 11/27/2018 4.9     Albumin/Globulin Ratio 11/27/2018 1.8     Total Bilirubin 11/27/2018 0.7     Alkaline Phosphatase 11/27/2018 73     ALT 11/27/2018 17     AST 11/27/2018 20     Globulin 11/27/2018 2.7     TSH 11/27/2018 0.41     Vit D, 25-Hydroxy 11/27/2018 43.0       No results found for this or any previous visit (from the past 24 hour(s)). Radiographic:  3 xray of the bilateral  knees taken previously and reviewed today in clinic reveal no fractures, dislocations, visible tumors, or signs of acute trauma. She does have varus alignment of her lower extremities and near bone-on-bone osteoarthritis on the left and complete bone-on-bone changes on the right. She has moderate patellofemoral changes in the right knee however only mild in the left. Radiographic Impression: Osteoarthritis bilateral knee    Assessment:  1. Bilateral knee osteoarthritis    Plan:   Mrs. Marley Capps is a very pleasant 66-year-old female who seen today in clinic for her bilateral knees. She reports the right is worse than the left. She has failed conservative treatment measures for her arthritis including activity modification, anti-inflammatories, physical therapy, and injections. She reports today that she is ready for a total knee replacement on the right. We discussed with her today the risks and benefits of the surgery and the expectations around the time of surgery and postoperative period. She would need to get medical clearance prior to her surgery and attend her joint class. We would also need an MRI for sizing of her implants prior to surgery. Her questions were sought and answered today. We spent extensive amount of time talking about surgical procedure and outcomes. We will see her back sometime in late June or July for her surgery. Stefani Greenberg IV, D.O.    Clinical Fellow, [address] [place] [address]  Date: 5/21/2019      The encounter with Camille Rose was supervised by Dr. Emeli Pitts, who personally examined the patient and reviewed the plan. This dictation was performed with a verbal recognition program (DRAGON) and it was checked for errors. It is possible that there are still dictated errors within this office note. If so, please bring any errors to my attention for an addendum. All efforts were made to ensure that this office note is accurate. This dictation was performed with a verbal recognition program (DRAGON) and it was checked for errors. It is possible that there are still dictated errors within this office note. If so, please bring any errors to my attention for an addendum. All efforts were made to ensure that this office note is accurate. Supervising Physician Attestation:  I, Dr. Angy Pritchard, personally performed the services described in this documentation as scribed above, and it is both accurate and complete and I agree with all pertinent clinical information. I personally interviewed the patient and performed a physical examination and medical decision making. I discussed the patient's condition and treatment options and have  reviewed and agree with the past medical, family and social history unless otherwise noted. All of the patient's questions were answered.       Board Certified Orthopaedic Surgeon  [facility] and [address] and [address] and [employer]  Professor of Vickie Barcenas

## 2019-05-26 DIAGNOSIS — I10 ESSENTIAL HYPERTENSION: ICD-10-CM

## 2019-05-29 DIAGNOSIS — E03.9 ACQUIRED HYPOTHYROIDISM: ICD-10-CM

## 2019-05-30 RX ORDER — LISINOPRIL AND HYDROCHLOROTHIAZIDE 12.5; 1 MG/1; MG/1
TABLET ORAL
Qty: 30 TABLET | Refills: 1 | Status: SHIPPED | OUTPATIENT
Start: 2019-05-30 | End: 2019-12-02 | Stop reason: SDUPTHER

## 2019-05-30 RX ORDER — LEVOTHYROXINE SODIUM 0.1 MG/1
TABLET ORAL
Qty: 30 TABLET | Refills: 2 | Status: SHIPPED | OUTPATIENT
Start: 2019-05-30 | End: 2019-08-26 | Stop reason: SDUPTHER

## 2019-06-12 DIAGNOSIS — M17.11 OSTEOARTHRITIS OF RIGHT KNEE, UNSPECIFIED OSTEOARTHRITIS TYPE: Primary | ICD-10-CM

## 2019-06-19 ENCOUNTER — HOSPITAL ENCOUNTER (OUTPATIENT)
Dept: GENERAL RADIOLOGY | Age: 64
Discharge: HOME | End: 2019-06-19
Payer: COMMERCIAL

## 2019-06-19 ENCOUNTER — HOSPITAL ENCOUNTER (OUTPATIENT)
Age: 64
Discharge: HOME | End: 2019-06-19
Payer: COMMERCIAL

## 2019-06-19 DIAGNOSIS — M17.11 OSTEOARTHRITIS OF RIGHT KNEE, UNSPECIFIED OSTEOARTHRITIS TYPE: ICD-10-CM

## 2019-06-19 PROCEDURE — 77073 BONE LENGTH STUDIES: CPT

## 2019-06-20 DIAGNOSIS — I10 ESSENTIAL HYPERTENSION: ICD-10-CM

## 2019-06-22 RX ORDER — AMLODIPINE BESYLATE 5 MG/1
TABLET ORAL
Qty: 30 TABLET | Refills: 5 | Status: SHIPPED | OUTPATIENT
Start: 2019-06-22 | End: 2019-12-23 | Stop reason: SDUPTHER

## 2019-07-02 ENCOUNTER — TELEPHONE (OUTPATIENT)
Dept: ORTHOPEDIC SURGERY | Age: 64
End: 2019-07-02

## 2019-07-08 ENCOUNTER — HOSPITAL ENCOUNTER (OUTPATIENT)
Dept: PREADMISSION TESTING | Age: 64
Discharge: HOME | End: 2019-07-12
Payer: COMMERCIAL

## 2019-07-08 VITALS
RESPIRATION RATE: 16 BRPM | SYSTOLIC BLOOD PRESSURE: 152 MMHG | BODY MASS INDEX: 31.1 KG/M2 | HEART RATE: 64 BPM | DIASTOLIC BLOOD PRESSURE: 72 MMHG | WEIGHT: 169 LBS | TEMPERATURE: 96.7 F | OXYGEN SATURATION: 97 % | HEIGHT: 62 IN

## 2019-07-08 LAB
ALBUMIN SERPL-MCNC: 4.3 G/DL (ref 3.4–5)
ALP BLD-CCNC: 67 U/L (ref 40–129)
ALT SERPL-CCNC: 16 U/L (ref 10–40)
ANION GAP SERPL CALCULATED.3IONS-SCNC: 11 MMOL/L (ref 3–16)
APTT: 30.5 SEC (ref 26–36)
AST SERPL-CCNC: 19 U/L (ref 15–37)
BILIRUB SERPL-MCNC: 1 MG/DL (ref 0–1)
BILIRUBIN DIRECT: <0.2 MG/DL (ref 0–0.3)
BILIRUBIN, INDIRECT: NORMAL MG/DL (ref 0–1)
BUN BLDV-MCNC: 18 MG/DL (ref 7–20)
CALCIUM SERPL-MCNC: 10.1 MG/DL (ref 8.3–10.6)
CHLORIDE BLD-SCNC: 102 MMOL/L (ref 99–110)
CO2: 27 MMOL/L (ref 21–32)
CREAT SERPL-MCNC: 0.7 MG/DL (ref 0.6–1.2)
EKG ATRIAL RATE: 52 BPM
EKG DIAGNOSIS: NORMAL
EKG P AXIS: 15 DEGREES
EKG P-R INTERVAL: 150 MS
EKG Q-T INTERVAL: 438 MS
EKG QRS DURATION: 98 MS
EKG QTC CALCULATION (BAZETT): 407 MS
EKG R AXIS: -16 DEGREES
EKG T AXIS: 31 DEGREES
EKG VENTRICULAR RATE: 52 BPM
GFR AFRICAN AMERICAN: >60
GFR NON-AFRICAN AMERICAN: >60
GLUCOSE BLD-MCNC: 109 MG/DL (ref 70–99)
HCT VFR BLD CALC: 38.1 % (ref 36–48)
HEMOGLOBIN: 13.1 G/DL (ref 12–16)
INR BLD: 1.01 (ref 0.86–1.14)
MCH RBC QN AUTO: 30.6 PG (ref 26–34)
MCHC RBC AUTO-ENTMCNC: 34.3 G/DL (ref 31–36)
MCV RBC AUTO: 89.4 FL (ref 80–100)
PDW BLD-RTO: 12.6 % (ref 12.4–15.4)
PLATELET # BLD: 312 K/UL (ref 135–450)
PMV BLD AUTO: 7.2 FL (ref 5–10.5)
POTASSIUM SERPL-SCNC: 3.8 MMOL/L (ref 3.5–5.1)
PROTHROMBIN TIME: 11.5 SEC (ref 9.8–13)
RBC # BLD: 4.26 M/UL (ref 4–5.2)
SODIUM BLD-SCNC: 140 MMOL/L (ref 136–145)
TOTAL PROTEIN: 7.4 G/DL (ref 6.4–8.2)
WBC # BLD: 6.6 K/UL (ref 4–11)

## 2019-07-08 PROCEDURE — 80048 BASIC METABOLIC PNL TOTAL CA: CPT

## 2019-07-08 PROCEDURE — 93005 ELECTROCARDIOGRAM TRACING: CPT | Performed by: ORTHOPAEDIC SURGERY

## 2019-07-08 PROCEDURE — 85027 COMPLETE CBC AUTOMATED: CPT

## 2019-07-08 PROCEDURE — 80076 HEPATIC FUNCTION PANEL: CPT

## 2019-07-08 PROCEDURE — 93010 ELECTROCARDIOGRAM REPORT: CPT | Performed by: INTERNAL MEDICINE

## 2019-07-08 PROCEDURE — 85610 PROTHROMBIN TIME: CPT

## 2019-07-08 PROCEDURE — 87641 MR-STAPH DNA AMP PROBE: CPT

## 2019-07-08 PROCEDURE — 85730 THROMBOPLASTIN TIME PARTIAL: CPT

## 2019-07-08 RX ORDER — OMEGA-3S/DHA/EPA/FISH OIL/D3 1150-1000
LIQUID (ML) ORAL
COMMUNITY
Start: 2019-03-21

## 2019-07-08 NOTE — PROGRESS NOTES
[address]                          Date of Procedure 7-17 Time of [id number]    PRIOR TO PROCEDURE DATE:  1. Please follow any guidelines/instructions prior to your procedure as advised by your surgeon. 2. Arrange for someone to drive you home and be with you for the first 24 hours after discharge for your safety after your procedure for which you received sedation. Ensure it is someone we can share information with regarding your discharge. 3. You must contact your surgeon for instructions IF:   You are taking any blood thinners, aspirin, anti-inflammatory or vitamin E. Follow instructions for stopping fish oil.  There is a change in your physical condition such as a cold, fever, rash, cuts, sores or any other infection, especially near your surgical site. 4. Do not drink alcohol the day before or day of your procedure. 5. A Pre-op History and Physical for surgery MUST be completed by your Physician or Urgent Care within 30 days of your procedure date. Please bring a copy with you on the day of your procedure and along with any other testing performed. THE DAY OF YOUR PROCEDURE:  1. Follow instructions for ARRIVAL TIME as DIRECTED BY YOUR SURGEON. If your surgeon does not give you a specific arrival time, please arrive at 0800 per Dr. Jorge Lima. 2. Enter the MAIN entrance from [employer] and follow the signs to the free [employer] [facility] [employer] parking (offered free of charge 6am-5pm). 3. Enter the Main Entrance of the hospital (do NOT enter from the lower level of the parking garage). Upon entrance, check in with the  at the main desk on your left. If no one is available at the desk, proceed into the [facility] Waiting Room and go through the door directly into the [facility]. There is a Check-in desk ACROSS from Room 5 (marked with a sign hanging from the ceiling).  The phone number for the surgery center is anesthesia. Your nurse will help you manage these potential side effects. 2. For comfort and safety, arrange to have someone at home with you for the first 24 hours after discharge. 3. You and your family will be given written instructions about your diet, activity, dressing care, medications, and return visits. 4. Once at home, should issues with nausea, pain, or bleeding occur, or should you notice any signs of infection, you should call your surgeon. 5. Always clean your hands before and after caring for your wound. Do not let your family touch your surgery site without cleaning their hands. 6. Narcotic pain medications can cause significant constipation. You may want to add a stool softener to your postoperative medication schedule or speak to your surgeon on how best to manage this SIDE EFFECT. SPECIAL INSTRUCTIONS     Thank you for allowing us to care for you. We strive to exceed your expectations in the overall delivery of care and service provided to you and your family. If you need to contact us for any reason, please call us at [phone]. Instructions reviewed and copy given to patient during preadmission testing visit. Paulette Boo. 7/8/2019 .8:24 AM      ADDITIONAL EDUCATIONAL INFORMATION REVIEWED / PROVIDED TO YOU AND YOUR FAMILY:  Yes Taking Control of Your Pain   Yes FAQs about Surgical Site Infections    No Cardiac Surgery Instructions for AM admission to the hospital  No Bactroban® Nasal Ointment Instructions for Cardiac Surgery  No Learning About Preventing Pressure Sores  No Cardiac Surgery Preoperative Hibiclens® Bathing Instructions  YES / NW:[id number]} Your Care after Heart Surgery Binder    No Chace® Wipes Bathing Instructions (Obtained from:  [email or web address] pdf )  Yes Hibiclens® Bathing Instructions  No Antibacterial Soap    Yes Incentive Spirometer given to patient- PLEASE BRING THIS SPIROMETER BACK WITH YOU ON THE DAY OF YOUR SURGERY    No CMS Comprehensive Care for Joint Replacement Model Notification Letter  No Your Guide to Hip Replacement Surgery. PLEASE BRING THIS BOOKLET BACK ON THE DAY OF YOUR SURGERY. Yes Your Guide to Knee Replacement Surgery. PLEASE BRING THIS BOOKLET BACK ON THE DAY OF YOUR SURGERY. No Your Guide to Shoulder Replacement Surgery. PLEASE BRING THIS BOOKLET BACK ON THE DAY OF YOUR SURGERY.   Yes  Reviewed/Given handout for TJ Video/Class    No Other

## 2019-07-09 ENCOUNTER — TELEPHONE (OUTPATIENT)
Dept: INTERNAL MEDICINE CLINIC | Age: 64
End: 2019-07-09

## 2019-07-09 ENCOUNTER — OFFICE VISIT (OUTPATIENT)
Dept: INTERNAL MEDICINE CLINIC | Age: 64
End: 2019-07-09
Payer: COMMERCIAL

## 2019-07-09 VITALS
HEIGHT: 62 IN | HEART RATE: 60 BPM | DIASTOLIC BLOOD PRESSURE: 68 MMHG | SYSTOLIC BLOOD PRESSURE: 136 MMHG | WEIGHT: 166 LBS | BODY MASS INDEX: 30.55 KG/M2 | RESPIRATION RATE: 16 BRPM | TEMPERATURE: 97.6 F | OXYGEN SATURATION: 95 %

## 2019-07-09 DIAGNOSIS — G89.29 CHRONIC PAIN OF RIGHT KNEE: ICD-10-CM

## 2019-07-09 DIAGNOSIS — Z01.818 PREOP EXAM FOR INTERNAL MEDICINE: ICD-10-CM

## 2019-07-09 DIAGNOSIS — I10 ESSENTIAL HYPERTENSION: ICD-10-CM

## 2019-07-09 DIAGNOSIS — M25.561 CHRONIC PAIN OF RIGHT KNEE: ICD-10-CM

## 2019-07-09 DIAGNOSIS — R94.31 ABNORMAL ECG: Primary | ICD-10-CM

## 2019-07-09 DIAGNOSIS — Z01.810 PREOP CARDIOVASCULAR EXAM: ICD-10-CM

## 2019-07-09 LAB — MRSA SCREEN RT-PCR: NORMAL

## 2019-07-09 PROCEDURE — 99242 OFF/OP CONSLTJ NEW/EST SF 20: CPT | Performed by: INTERNAL MEDICINE

## 2019-07-09 SDOH — HEALTH STABILITY: MENTAL HEALTH: HOW OFTEN DO YOU HAVE A DRINK CONTAINING ALCOHOL?: 2-4 TIMES A MONTH

## 2019-07-09 SDOH — HEALTH STABILITY: MENTAL HEALTH: HOW MANY STANDARD DRINKS CONTAINING ALCOHOL DO YOU HAVE ON A TYPICAL DAY?: 1 OR 2

## 2019-07-09 NOTE — PATIENT INSTRUCTIONS
Patient Education        Electrocardiogram (EKG, ECG): About This Test  What is it? An electrocardiogram (EKG or ECG) is a test that checks for problems with the electrical activity of your heart. An EKG translates the heart's electrical activity into line tracings on paper. Why is this test done? You may need this test to check your heart's electrical activity. The test also can check the health of your heart. For example, it can help find the cause of unexplained chest pain or pressure, or other symptoms of heart disease. How can you prepare for the test?  · Tell your doctor about all the nonprescription and prescription medicines you take. Many medicines may change the results of this test.  What happens during the test?  · You may have to remove certain jewelry. · You will take your top off and be given a gown to wear. · You will lie on a bed or table. Parts of your arms, legs, and chest will be cleaned and may be shaved. · Small pads or patches (electrodes) will be attached to your skin on each arm and leg and on your chest. A special paste or pad may go between the electrode and your skin. The electrodes are hooked to a machine that traces your heart activity onto a paper. · During the test, lie very still and breathe normally. Do not talk during the test.  What else should you know about the test?  · An EKG is a completely safe test. No electricity passes through your body from the machine, and there is no danger of getting an electrical shock. How long does the test take? · The test usually takes 5 to 10 minutes. What happens after the test?  · You will probably be able to go home right away. · You can go back to your usual activities right away. When should you call for help? Watch closely for changes in your health, and be sure to contact your doctor if you have any problems. Follow-up care is a key part of your treatment and safety.  Be sure to make and go to all appointments, and call

## 2019-07-10 ENCOUNTER — OFFICE VISIT (OUTPATIENT)
Dept: CARDIOLOGY CLINIC | Age: 64
End: 2019-07-10
Payer: COMMERCIAL

## 2019-07-10 VITALS
BODY MASS INDEX: 31.23 KG/M2 | WEIGHT: 168 LBS | HEART RATE: 67 BPM | SYSTOLIC BLOOD PRESSURE: 132 MMHG | DIASTOLIC BLOOD PRESSURE: 68 MMHG | OXYGEN SATURATION: 97 %

## 2019-07-10 DIAGNOSIS — I36.1 NON-RHEUMATIC TRICUSPID VALVE INSUFFICIENCY: ICD-10-CM

## 2019-07-10 DIAGNOSIS — Z01.810 PREOP CARDIOVASCULAR EXAM: Primary | ICD-10-CM

## 2019-07-10 DIAGNOSIS — I35.1 NONRHEUMATIC AORTIC VALVE INSUFFICIENCY: ICD-10-CM

## 2019-07-10 DIAGNOSIS — R94.31 ABNORMAL ECG: ICD-10-CM

## 2019-07-10 DIAGNOSIS — R00.1 BRADYCARDIA: ICD-10-CM

## 2019-07-10 PROCEDURE — 99214 OFFICE O/P EST MOD 30 MIN: CPT | Performed by: INTERNAL MEDICINE

## 2019-07-10 NOTE — PROGRESS NOTES
59 y.o. here for pre-op eval and for valve disease. Has knee surgery planned for next week. No cp. No sob. No n/v/lh/dizziness. No syncope. Has some LE edema if sitting a lot; wears compression hose. No orthopnea, No PND. Joined a gym. Goes 6 days a week. Can't do treadmill. Does ellyptical. Last time went was this morning. When she does the ellyptical, which was in Jan, did 10-15 mins. Does 6-8 mins now on ellyptical but goes slower. No problems with sob or cp. HR goes to 135 or so on ellyptical.    Past Medical History:   Diagnosis Date    Anesthesia     \"heart stopped\" during ovarian surgery, abd insufflation. restarted on its own. 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    Endometrial polyp 12/2014    Hyperlipidemia 2/13/2013    Hypertension     pregnancy induced    Hypothyroidism     Lateral epicondylitis 7/1/2015    Plantar fasciitis of right foot 7/24/2013    Sleep apnea     uses CPAP     Past Surgical History:   Procedure Laterality Date    BREAST BIOPSY  1987    Left    FINE NEEDLE ASPIRATION  1983    Left Breast    MOUTH SURGERY  1990s    jaw surgery    OVARIAN CYST SURGERY Left 12-27-13    TONSILLECTOMY       Social History     Tobacco Use    Smoking status: Never Smoker    Smokeless tobacco: Never Used    Tobacco comment: 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 Substance Use Topics    Alcohol use: Yes     Alcohol/week: 0.0 oz     Frequency: 2-4 times a month     Drinks per session: 1 or 2     Comment: I drink only occasionally, less than once a month.     Drug use: No     Allergies   Allergen Reactions    Tussionex Pennkinetic Er [Hydrocod Polst-Cpm Polst Er]     Bactrim [Sulfamethoxazole-Trimethoprim] Rash    Doxycycline Hyclate Nausea And Vomiting    Jose-Tab [Erythromycin] Nausea And Vomiting    Sulfa Antibiotics Rash     Family History   Problem Relation Age of Onset    Coronary Art Dis Father     Heart Failure Father

## 2019-07-15 ENCOUNTER — HOSPITAL ENCOUNTER (OUTPATIENT)
Dept: NON INVASIVE DIAGNOSTICS | Age: 64
Discharge: HOME | DRG: 470 | End: 2019-07-15
Payer: COMMERCIAL

## 2019-07-15 DIAGNOSIS — I35.1 NONRHEUMATIC AORTIC VALVE INSUFFICIENCY: ICD-10-CM

## 2019-07-15 DIAGNOSIS — Z01.810 PREOP CARDIOVASCULAR EXAM: ICD-10-CM

## 2019-07-15 DIAGNOSIS — R94.31 ABNORMAL ECG: ICD-10-CM

## 2019-07-15 DIAGNOSIS — I36.1 NON-RHEUMATIC TRICUSPID VALVE INSUFFICIENCY: ICD-10-CM

## 2019-07-15 DIAGNOSIS — R00.1 BRADYCARDIA: ICD-10-CM

## 2019-07-15 LAB
LV EF: 63 %
LV EF: 81 %
LVEF MODALITY: NORMAL
LVEF MODALITY: NORMAL

## 2019-07-15 PROCEDURE — 93306 TTE W/DOPPLER COMPLETE: CPT

## 2019-07-15 PROCEDURE — 78452 HT MUSCLE IMAGE SPECT MULT: CPT

## 2019-07-15 PROCEDURE — 2580000003 HC RX 258: Performed by: INTERNAL MEDICINE

## 2019-07-15 PROCEDURE — 6360000002 HC RX W HCPCS: Performed by: INTERNAL MEDICINE

## 2019-07-15 PROCEDURE — 93017 CV STRESS TEST TRACING ONLY: CPT

## 2019-07-15 PROCEDURE — A9502 TC99M TETROFOSMIN: HCPCS | Performed by: INTERNAL MEDICINE

## 2019-07-15 PROCEDURE — 3430000000 HC RX DIAGNOSTIC RADIOPHARMACEUTICAL: Performed by: INTERNAL MEDICINE

## 2019-07-15 RX ORDER — SODIUM CHLORIDE 0.9 % (FLUSH) 0.9 %
10 SYRINGE (ML) INJECTION PRN
Status: DISCONTINUED | OUTPATIENT
Start: 2019-07-15 | End: 2019-07-16 | Stop reason: HOSPADM

## 2019-07-15 RX ADMIN — Medication 10 ML: at 10:34

## 2019-07-15 RX ADMIN — Medication 10 ML: at 08:25

## 2019-07-15 RX ADMIN — REGADENOSON 0.4 MG: 0.08 INJECTION, SOLUTION INTRAVENOUS at 10:34

## 2019-07-15 RX ADMIN — TETROFOSMIN 10 MILLICURIE: 1.38 INJECTION, POWDER, LYOPHILIZED, FOR SOLUTION INTRAVENOUS at 08:25

## 2019-07-15 RX ADMIN — TETROFOSMIN 30 MILLICURIE: 1.38 INJECTION, POWDER, LYOPHILIZED, FOR SOLUTION INTRAVENOUS at 10:34

## 2019-07-16 ENCOUNTER — ANESTHESIA EVENT (OUTPATIENT)
Dept: OPERATING ROOM | Age: 64
DRG: 470 | End: 2019-07-16
Payer: COMMERCIAL

## 2019-07-16 ENCOUNTER — HOSPITAL ENCOUNTER (OUTPATIENT)
Dept: PHYSICAL THERAPY | Age: 64
Setting detail: THERAPIES SERIES
Discharge: HOME | End: 2019-07-16
Payer: COMMERCIAL

## 2019-07-16 ENCOUNTER — OFFICE VISIT (OUTPATIENT)
Dept: ORTHOPEDIC SURGERY | Age: 64
End: 2019-07-16

## 2019-07-16 VITALS
BODY MASS INDEX: 31.72 KG/M2 | WEIGHT: 167.99 LBS | HEIGHT: 61 IN | SYSTOLIC BLOOD PRESSURE: 115 MMHG | HEART RATE: 86 BPM | DIASTOLIC BLOOD PRESSURE: 76 MMHG

## 2019-07-16 DIAGNOSIS — M17.11 OSTEOARTHRITIS OF RIGHT KNEE, UNSPECIFIED OSTEOARTHRITIS TYPE: ICD-10-CM

## 2019-07-16 DIAGNOSIS — Z01.818 PRE-OP EXAM: Primary | ICD-10-CM

## 2019-07-16 DIAGNOSIS — M25.561 RIGHT KNEE PAIN, UNSPECIFIED CHRONICITY: ICD-10-CM

## 2019-07-16 PROCEDURE — MISC250 COMPRESSION STOCKING: Performed by: ORTHOPAEDIC SURGERY

## 2019-07-16 PROCEDURE — 99024 POSTOP FOLLOW-UP VISIT: CPT | Performed by: ORTHOPAEDIC SURGERY

## 2019-07-16 PROCEDURE — 97162 PT EVAL MOD COMPLEX 30 MIN: CPT | Performed by: PHYSICAL THERAPIST

## 2019-07-16 PROCEDURE — 97110 THERAPEUTIC EXERCISES: CPT | Performed by: PHYSICAL THERAPIST

## 2019-07-16 PROCEDURE — 97530 THERAPEUTIC ACTIVITIES: CPT | Performed by: PHYSICAL THERAPIST

## 2019-07-16 NOTE — PLAN OF CARE
[facility]                                                       Physical Therapy Certification    Dear Francesca Akers MD,    We had the pleasure of evaluating the following patient for physical therapy services at [address] [place] [address]. A summary of our findings can be found in the initial assessment below. This includes our plan of care. If you have any questions or concerns regarding these findings, please do not hesitate to contact me at the office phone number checked above. Thank you for the referral.       Physician Signature:_______________________________Date:__________________  By signing above (or electronic signature), therapists plan is approved by physician      Patient: Sheree Pruett   : 1955   MRN: [id number]  Referring Physician: Referring Practitioner: Francesca Akers MD      Evaluation Date: 2019      Medical Diagnosis Information:  Diagnosis: M17.11 (ICD-10-CM) - Osteoarthritis of right knee, unspecified osteoarthritis type   Treatment Diagnosis: Knee pain/ swelling/ difficulty walking/ limited ROM/ LE muscle weakness/ LE balance deficit   Preop for R knee TKR  DOS: 19  Current PO meds: NA                                          Insurance information:  ANTHEM/ EFF 18/ IND ; / IND OOP MAX 3600; .76/ CO-INS COV 80%/ OOP 20%/ 30 VPCY/ NO AUTH EULOGIO/ HUNTER/ REF # [id number] USED 2 VISITS PREV/ PAG/ 19     Precautions/ Contra-indications: History for L knee OA  Latex Allergy:  [x]NO      []YES  Preferred Language for Healthcare:   [x]English       []other:    SUBJECTIVE: Sabrina Hamlin is a healthy and well appearing 59y.o. year old female who presents to our office today complaining of  bilateral knee pain. She reports that right is worse than the left.   She's been having pain for many months and is described as sharp and aching pain. She had no prior injury. She describes an occasional ice pick like pain on the inside of her knees especially with standing or walking long distances. She also has problems going up and down stairs. She was seen by Dr. Dot Saunders before who offered her injections. Injection on her left knee lasted her a good amount of time however the one on the right only lasted her about a week. She is at a point where she is no longer wanting to deal with her pain related to knee arthritis. She is requesting something be done more definitive. She is failed conservative treatment measures including therapy, anti-inflammatories, activity modification, and injections. She reports for her preoperative assessment for physical therapy.   (+) giving way/ partial ~1x mos x4  (+/-) night pain  (+) popping  (+)catching/locking// from flexion to extension  (+) swelling  (+) stiffness/ sitting  (+) stairs/ down> up  (+) kneeling/squatting// avoids squatting due to pain/ limitation of ROM    Relevant Medical History:Bilateral OA; otherwise benign per self report  Functional Disability Index:    LEFS Score: 29/ 80= 36% (Preop)    Pain Scale: 2-7/10  Easing factors: Rest; ice; meds  Provocative factors: Sleeping; positional changes; walking; squatting; stairs; kneeling; standing     Type: [x]Constant   []Intermittent  []Radiating []Localized []other:     Numbness/Tingling: None    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)    Living Status/Prior Level of Function: Independent with ADLs and IADLs, Fitness walking/ strength training; stretching including home exercise and fitness training at [address]:      LEFS Score: 29/ 80= 36% (7/16/19; Preop)    7-16-19  ROM PROM AROM Overpressure Comment    L R L R L R    Flexion 120 115        Extension 0 0                              7-16-19  Strength L R Comment   Quad 4+/5 4/5 (pain) ANABEL 0°   Hamstring      Gastroc Hip  flexion      Hip abd                    7-16-19  Special Test Results/Comment   Homans (-)   Temperature (-)     7-16-19  Girth L R   Mid Patella 44.0 45.0   Suprapatellar 44.6 46.0   5cm above 46.0 46.7   15cm above       Reflexes/Sensation:    []Dermatomes/Myotomes intact    []Reflexes equal and normal bilaterally   [x]Other:  NT    Joint mobility:     [x]Normal    []Hypo   []Hyper    Palpation: Tenderness along the MJL    Functional Mobility/Transfers: Independent    Posture: Bilateral genu varum; LLD =    Bandages/Dressings/Incisions: NA    Gait: FWB without assistance; mild antalgia due weakness/ pain/ varus malalignment    Orthopedic Special Tests:     TEST INITIAL  7-16-19  GOAL   SINGLE LEG STANCE TIME L:4.67 sec    R:3.24 sec  >25 SECONDS   TIMED UP And GO 12.24 sec  61-77 y/o: <9sec  66-76 y/o: <10sec  80-81 y/o: <12 sec                          [x] Patient history, allergies, meds reviewed. Medical chart reviewed. See intake form. Review Of Systems (ROS):  [x]Performed Review of systems (Integumentary, CardioPulmonary, Neurological) by intake and observation. Intake form has been scanned into medical record. Patient has been instructed to contact their primary care physician regarding ROS issues if not already being addressed at this time.       Co-morbidities/Complexities (which will affect course of rehabilitation):   []None           Arthritic conditions   []Rheumatoid arthritis (M05.9)  [x]Osteoarthritis (M19.91)   Cardiovascular conditions   []Hypertension (I10)  []Hyperlipidemia (E78.5)  []Angina pectoris (I20)  []Atherosclerosis (I70)   Musculoskeletal conditions   []Disc pathology   []Congenital spine pathologies   []Prior surgical intervention  []Osteoporosis (M81.8)  []Osteopenia (M85.8)   Endocrine conditions   []Hypothyroid (E03.9)  []Hyperthyroid Gastrointestinal conditions   []Constipation (S62.24)   Metabolic conditions   []Morbid obesity (E66.01)  []Diabetes type 1(E10.65) or 2 characteristics   [x] evolving clinical presentation with changing characteristics  [] unstable and unpredictable characteristics;   [x] Clinical decision making of [] low, [x] moderate, [] high complexity using standardized patient assessment instrument and/or measurable assessment of functional outcome. [] EVAL (LOW) 04966 (typically 20 minutes face-to-face)  [x] EVAL (MOD) 15011 (typically 30 minutes face-to-face)  [] EVAL (HIGH) 91334 (typically 45 minutes face-to-face)  [] RE-EVAL       PLAN  Frequency/Duration:  2 days per week for 12 Weeks:  Interventions:  [x]  Therapeutic exercise including: strength training, ROM, for Lower extremity and core   [x]  NMR activation and proprioception for LE, Glutes and Core   [x]  Manual therapy as indicated for LE, Hip and spine to include: Dry Needling/IASTM, STM, PROM, Gr I-IV mobilizations, manipulation. [x] Modalities as needed that may include: thermal agents, E-stim, Biofeedback, US, iontophoresis as indicated  [x] Patient education on joint protection, postural re-education, activity modification, progression of HEP. HEP instruction: See attached for HEP/ HTP/ full preop instructions located in the Media tab of [facility]    GOALS:  Patient stated goal: Would like to resume her walking/ fitness program    Therapist goals for Patient:   Short Term Goals: To be achieved in: 2 weeks  1. Independent in HEP and progression per patient tolerance, in order to prevent re-injury. 2. Patient will have a decrease in pain to facilitate improvement in movement, function, and ADLs as indicated by Functional Deficits. Long Term Goals: To be achieved in: 12 weeks PO  1. Disability index score of 40% or less for the LEFS to assist with reaching prior level of function. 2. Patient will demonstrate increased AROM to 0-125-130 deg to allow for proper joint functioning as indicated by patients Functional Deficits.    3. Patient will demonstrate an increase in Strength to good

## 2019-07-16 NOTE — FLOWSHEET NOTE
to assist with LE, proximal hip, and core control in self care, mobility, lifting, ambulation and eccentric single leg control. NMR and Therapeutic Activities:    [x] (15052 or 50577) Provided verbal/tactile cueing for activities related to improving balance, coordination, kinesthetic sense, posture, motor skill, proprioception and motor activation to allow for proper function of core, proximal hip and LE with self care and ADLs  [x] (03669) Gait Re-education- Provided training and instruction to the patient for proper LE, core and proximal hip recruitment and positioning and eccentric body weight control with ambulation re-education including up and down stairs     Home Exercise Program:    [x] (64042) Reviewed/Progressed HEP activities related to strengthening, flexibility, endurance, ROM of core, proximal hip and LE for functional self-care, mobility, lifting and ambulation/stair navigation   [x] (86653)Reviewed/Progressed HEP activities related to improving balance, coordination, kinesthetic sense, posture, motor skill, proprioception of core, proximal hip and LE for self care, mobility, lifting, and ambulation/stair navigation      Manual Treatments:  PROM / STM / Oscillations-Mobs:  G-I, II, III, IV (PA's, Inf., Post.)  [x] (00547) Provided manual therapy to mobilize LE, proximal hip and/or LS spine soft tissue/joints for the purpose of modulating pain, promoting relaxation,  increasing ROM, reducing/eliminating soft tissue swelling/inflammation/restriction, improving soft tissue extensibility and allowing for proper ROM for normal function with self care, mobility, lifting and ambulation.      Modalities:    [] hot packs  [] EMS   [] Ultrasound  [] ice   [] vasopneumatic  [] high volt/EGS  [] phono  [] tens    [] ionto  [] autorange/biodex [] Interferential  [] other     Charges:  Timed Code Treatment Minutes: 30'   Total Treatment Minutes: 61'     [x] EVAL: L2  [x] IA(65827) x  1   [] IONTO  [] NMR Limitations (from functional questionnaire and intake)              [x]Reduced ability to tolerate prolonged functional positions              [x]Reduced ability or difficulty with changes of positions or transfers between positions              [x]Reduced ability to maintain good posture and demonstrate good body mechanics with sitting, bending, and lifting              [x]Reduced ability to sleep              [] Reduced ability or tolerance with driving and/or computer work              [x]Reduced ability to perform lifting, carrying tasks              [x]Reduced ability to squat              []Reduced ability to forward bend              [x]Reduced ability to ambulate prolonged functional periods/distances/surfaces              [x]Reduced ability to ascend/descend stairs              [x]Reduced ability to run, hop or jump              []other:     Participation Restrictions              []Reduced participation in self care activities              []Reduced participation in home management activities              [x]Reduced participation in work activities              [x]Reduced participation in social activities. [x]Reduced participation in sport activities.     Classification :               []Signs/symptoms consistent with post-surgical status including decreased ROM, strength and function.               []Signs/symptoms consistent with joint sprain/strain              [x]Signs/symptoms consistent with patella-femoral syndrome              [x]Signs/symptoms consistent with knee OA/hip OA              []Signs/symptoms consistent with internal derangement of knee/Hip              [x]Signs/symptoms consistent with functional hip weakness/NMR control                     []Signs/symptoms consistent with tendinitis/tendinosis                         []signs/symptoms consistent with pathology which may benefit from Dry needling                  []other:       Prognosis/Rehab Potential:

## 2019-07-17 ENCOUNTER — ANESTHESIA (OUTPATIENT)
Dept: OPERATING ROOM | Age: 64
DRG: 470 | End: 2019-07-17
Payer: COMMERCIAL

## 2019-07-17 ENCOUNTER — HOSPITAL ENCOUNTER (INPATIENT)
Age: 64
LOS: 1 days | Discharge: HOME | DRG: 470 | End: 2019-07-18
Attending: ORTHOPAEDIC SURGERY | Admitting: ORTHOPAEDIC SURGERY
Payer: COMMERCIAL

## 2019-07-17 VITALS — OXYGEN SATURATION: 95 % | SYSTOLIC BLOOD PRESSURE: 148 MMHG | TEMPERATURE: 95.5 F | DIASTOLIC BLOOD PRESSURE: 71 MMHG

## 2019-07-17 DIAGNOSIS — Z47.1 AFTERCARE FOLLOWING RIGHT KNEE JOINT REPLACEMENT SURGERY: Primary | ICD-10-CM

## 2019-07-17 DIAGNOSIS — G89.18 ACUTE POSTOPERATIVE PAIN OF RIGHT KNEE: ICD-10-CM

## 2019-07-17 DIAGNOSIS — M17.11 PRIMARY OSTEOARTHRITIS OF RIGHT KNEE: ICD-10-CM

## 2019-07-17 DIAGNOSIS — M25.561 ACUTE POSTOPERATIVE PAIN OF RIGHT KNEE: ICD-10-CM

## 2019-07-17 DIAGNOSIS — Z96.651 AFTERCARE FOLLOWING RIGHT KNEE JOINT REPLACEMENT SURGERY: Primary | ICD-10-CM

## 2019-07-17 PROCEDURE — 2500000003 HC RX 250 WO HCPCS: Performed by: ORTHOPAEDIC SURGERY

## 2019-07-17 PROCEDURE — 3600000004 HC SURGERY LEVEL 4 BASE: Performed by: ORTHOPAEDIC SURGERY

## 2019-07-17 PROCEDURE — 3700000000 HC ANESTHESIA ATTENDED CARE: Performed by: ORTHOPAEDIC SURGERY

## 2019-07-17 PROCEDURE — 2580000003 HC RX 258: Performed by: ORTHOPAEDIC SURGERY

## 2019-07-17 PROCEDURE — 6360000002 HC RX W HCPCS: Performed by: ORTHOPAEDIC SURGERY

## 2019-07-17 PROCEDURE — 6360000002 HC RX W HCPCS: Performed by: NURSE ANESTHETIST, CERTIFIED REGISTERED

## 2019-07-17 PROCEDURE — 6360000002 HC RX W HCPCS

## 2019-07-17 PROCEDURE — 1200000000 HC SEMI PRIVATE

## 2019-07-17 PROCEDURE — 2580000003 HC RX 258: Performed by: ANESTHESIOLOGY

## 2019-07-17 PROCEDURE — 3600000014 HC SURGERY LEVEL 4 ADDTL 15MIN: Performed by: ORTHOPAEDIC SURGERY

## 2019-07-17 PROCEDURE — 6370000000 HC RX 637 (ALT 250 FOR IP): Performed by: ORTHOPAEDIC SURGERY

## 2019-07-17 PROCEDURE — 94150 VITAL CAPACITY TEST: CPT

## 2019-07-17 PROCEDURE — 2709999900 HC NON-CHARGEABLE SUPPLY: Performed by: ORTHOPAEDIC SURGERY

## 2019-07-17 PROCEDURE — 3700000001 HC ADD 15 MINUTES (ANESTHESIA): Performed by: ORTHOPAEDIC SURGERY

## 2019-07-17 PROCEDURE — 94799 UNLISTED PULMONARY SVC/PX: CPT

## 2019-07-17 PROCEDURE — C1713 ANCHOR/SCREW BN/BN,TIS/BN: HCPCS | Performed by: ORTHOPAEDIC SURGERY

## 2019-07-17 PROCEDURE — 2720000010 HC SURG SUPPLY STERILE: Performed by: ORTHOPAEDIC SURGERY

## 2019-07-17 PROCEDURE — 2500000003 HC RX 250 WO HCPCS: Performed by: NURSE ANESTHETIST, CERTIFIED REGISTERED

## 2019-07-17 PROCEDURE — 7100000001 HC PACU RECOVERY - ADDTL 15 MIN: Performed by: ORTHOPAEDIC SURGERY

## 2019-07-17 PROCEDURE — C1776 JOINT DEVICE (IMPLANTABLE): HCPCS | Performed by: ORTHOPAEDIC SURGERY

## 2019-07-17 PROCEDURE — 2500000003 HC RX 250 WO HCPCS: Performed by: ANESTHESIOLOGY

## 2019-07-17 PROCEDURE — 0SRC0J9 REPLACEMENT OF RIGHT KNEE JOINT WITH SYNTHETIC SUBSTITUTE, CEMENTED, OPEN APPROACH: ICD-10-PCS | Performed by: ORTHOPAEDIC SURGERY

## 2019-07-17 PROCEDURE — 6360000002 HC RX W HCPCS: Performed by: ANESTHESIOLOGY

## 2019-07-17 PROCEDURE — 7100000000 HC PACU RECOVERY - FIRST 15 MIN: Performed by: ORTHOPAEDIC SURGERY

## 2019-07-17 DEVICE — JOURNEY II BCS FEMORAL OXINIUM                                    RIGHT SIZE 4
Type: IMPLANTABLE DEVICE | Site: KNEE | Status: FUNCTIONAL
Brand: JOURNEY

## 2019-07-17 DEVICE — JOURNEY 7.5 ROUND RESURF PAT 32MM STANDARD
Type: IMPLANTABLE DEVICE | Site: KNEE | Status: FUNCTIONAL
Brand: JOURNEY

## 2019-07-17 DEVICE — JOURNEY II BCS XLPE ARTICULAR                                    INSERT SZ 3-4 RIGHT 12MM
Type: IMPLANTABLE DEVICE | Site: KNEE | Status: FUNCTIONAL
Brand: JOURNEY

## 2019-07-17 DEVICE — JOURNEY TIBIAL BASEPLATE NONPOROUS                                    RIGHT SIZE 3
Type: IMPLANTABLE DEVICE | Site: KNEE | Status: FUNCTIONAL
Brand: JOURNEY

## 2019-07-17 DEVICE — IMPLANTABLE DEVICE: Type: IMPLANTABLE DEVICE | Site: KNEE | Status: FUNCTIONAL

## 2019-07-17 DEVICE — CEMENT BNE 20ML 41GM FULL DOSE PMMA W/ TOBRA M VISC RADPQ: Type: IMPLANTABLE DEVICE | Site: KNEE | Status: FUNCTIONAL

## 2019-07-17 RX ORDER — ASPIRIN 81 MG/1
81 TABLET ORAL 2 TIMES DAILY
Qty: 60 TABLET | Refills: 0 | Status: SHIPPED | OUTPATIENT
Start: 2019-07-17 | End: 2019-11-05 | Stop reason: CLARIF

## 2019-07-17 RX ORDER — TAMSULOSIN HYDROCHLORIDE 0.4 MG/1
0.4 CAPSULE ORAL DAILY
Status: DISCONTINUED | OUTPATIENT
Start: 2019-07-17 | End: 2019-07-18 | Stop reason: HOSPADM

## 2019-07-17 RX ORDER — ROCURONIUM BROMIDE 10 MG/ML
INJECTION, SOLUTION INTRAVENOUS PRN
Status: DISCONTINUED | OUTPATIENT
Start: 2019-07-17 | End: 2019-07-17 | Stop reason: SDUPTHER

## 2019-07-17 RX ORDER — DIPHENHYDRAMINE HCL 25 MG
25 TABLET ORAL EVERY 6 HOURS PRN
Status: DISCONTINUED | OUTPATIENT
Start: 2019-07-17 | End: 2019-07-18 | Stop reason: HOSPADM

## 2019-07-17 RX ORDER — MIDAZOLAM HYDROCHLORIDE 1 MG/ML
INJECTION INTRAMUSCULAR; INTRAVENOUS PRN
Status: DISCONTINUED | OUTPATIENT
Start: 2019-07-17 | End: 2019-07-17 | Stop reason: SDUPTHER

## 2019-07-17 RX ORDER — ONDANSETRON 2 MG/ML
4 INJECTION INTRAMUSCULAR; INTRAVENOUS EVERY 30 MIN PRN
Status: DISCONTINUED | OUTPATIENT
Start: 2019-07-17 | End: 2019-07-17 | Stop reason: HOSPADM

## 2019-07-17 RX ORDER — ONDANSETRON 2 MG/ML
INJECTION INTRAMUSCULAR; INTRAVENOUS PRN
Status: DISCONTINUED | OUTPATIENT
Start: 2019-07-17 | End: 2019-07-17 | Stop reason: SDUPTHER

## 2019-07-17 RX ORDER — GLYCOPYRROLATE 0.2 MG/ML
0.1 INJECTION INTRAMUSCULAR; INTRAVENOUS ONCE
Status: COMPLETED | OUTPATIENT
Start: 2019-07-17 | End: 2019-07-17

## 2019-07-17 RX ORDER — POLYETHYLENE GLYCOL 3350 17 G/17G
17 POWDER, FOR SOLUTION ORAL DAILY
Status: DISCONTINUED | OUTPATIENT
Start: 2019-07-17 | End: 2019-07-18 | Stop reason: HOSPADM

## 2019-07-17 RX ORDER — ONDANSETRON 2 MG/ML
4 INJECTION INTRAMUSCULAR; INTRAVENOUS EVERY 6 HOURS PRN
Status: DISCONTINUED | OUTPATIENT
Start: 2019-07-17 | End: 2019-07-18 | Stop reason: HOSPADM

## 2019-07-17 RX ORDER — MAGNESIUM HYDROXIDE 1200 MG/15ML
LIQUID ORAL CONTINUOUS PRN
Status: COMPLETED | OUTPATIENT
Start: 2019-07-17 | End: 2019-07-17

## 2019-07-17 RX ORDER — OXYCODONE HYDROCHLORIDE AND ACETAMINOPHEN 5; 325 MG/1; MG/1
1-2 TABLET ORAL EVERY 6 HOURS PRN
Qty: 40 TABLET | Refills: 0 | Status: SHIPPED | OUTPATIENT
Start: 2019-07-17 | End: 2019-07-24

## 2019-07-17 RX ORDER — FENTANYL CITRATE 50 UG/ML
INJECTION, SOLUTION INTRAMUSCULAR; INTRAVENOUS PRN
Status: DISCONTINUED | OUTPATIENT
Start: 2019-07-17 | End: 2019-07-17 | Stop reason: SDUPTHER

## 2019-07-17 RX ORDER — SODIUM CHLORIDE 9 MG/ML
INJECTION, SOLUTION INTRAVENOUS CONTINUOUS
Status: DISCONTINUED | OUTPATIENT
Start: 2019-07-17 | End: 2019-07-18 | Stop reason: HOSPADM

## 2019-07-17 RX ORDER — KETOROLAC TROMETHAMINE 30 MG/ML
INJECTION, SOLUTION INTRAMUSCULAR; INTRAVENOUS PRN
Status: DISCONTINUED | OUTPATIENT
Start: 2019-07-17 | End: 2019-07-17 | Stop reason: SDUPTHER

## 2019-07-17 RX ORDER — SODIUM CHLORIDE 0.9 % (FLUSH) 0.9 %
10 SYRINGE (ML) INJECTION EVERY 12 HOURS SCHEDULED
Status: DISCONTINUED | OUTPATIENT
Start: 2019-07-17 | End: 2019-07-17 | Stop reason: HOSPADM

## 2019-07-17 RX ORDER — SODIUM CHLORIDE 0.9 % (FLUSH) 0.9 %
10 SYRINGE (ML) INJECTION EVERY 12 HOURS SCHEDULED
Status: DISCONTINUED | OUTPATIENT
Start: 2019-07-17 | End: 2019-07-18 | Stop reason: HOSPADM

## 2019-07-17 RX ORDER — 0.9 % SODIUM CHLORIDE 0.9 %
500 INTRAVENOUS SOLUTION INTRAVENOUS
Status: DISCONTINUED | OUTPATIENT
Start: 2019-07-17 | End: 2019-07-17 | Stop reason: HOSPADM

## 2019-07-17 RX ORDER — AMOXICILLIN 250 MG
2 CAPSULE ORAL DAILY PRN
Qty: 28 TABLET | Refills: 0 | Status: SHIPPED | OUTPATIENT
Start: 2019-07-17 | End: 2019-07-31

## 2019-07-17 RX ORDER — HYDROMORPHONE HCL 110MG/55ML
PATIENT CONTROLLED ANALGESIA SYRINGE INTRAVENOUS PRN
Status: DISCONTINUED | OUTPATIENT
Start: 2019-07-17 | End: 2019-07-17 | Stop reason: SDUPTHER

## 2019-07-17 RX ORDER — SODIUM CHLORIDE 0.9 % (FLUSH) 0.9 %
10 SYRINGE (ML) INJECTION PRN
Status: DISCONTINUED | OUTPATIENT
Start: 2019-07-17 | End: 2019-07-17 | Stop reason: HOSPADM

## 2019-07-17 RX ORDER — AMLODIPINE BESYLATE 5 MG/1
5 TABLET ORAL DAILY
Status: DISCONTINUED | OUTPATIENT
Start: 2019-07-18 | End: 2019-07-18 | Stop reason: HOSPADM

## 2019-07-17 RX ORDER — LEVOTHYROXINE SODIUM 0.1 MG/1
100 TABLET ORAL DAILY
Status: DISCONTINUED | OUTPATIENT
Start: 2019-07-18 | End: 2019-07-18 | Stop reason: HOSPADM

## 2019-07-17 RX ORDER — ASPIRIN 81 MG/1
81 TABLET ORAL 2 TIMES DAILY
Status: DISCONTINUED | OUTPATIENT
Start: 2019-07-17 | End: 2019-07-18 | Stop reason: HOSPADM

## 2019-07-17 RX ORDER — LISINOPRIL AND HYDROCHLOROTHIAZIDE 12.5; 1 MG/1; MG/1
1 TABLET ORAL DAILY
Status: DISCONTINUED | OUTPATIENT
Start: 2019-07-17 | End: 2019-07-18 | Stop reason: HOSPADM

## 2019-07-17 RX ORDER — SENNA AND DOCUSATE SODIUM 50; 8.6 MG/1; MG/1
1 TABLET, FILM COATED ORAL 2 TIMES DAILY
Status: DISCONTINUED | OUTPATIENT
Start: 2019-07-17 | End: 2019-07-18 | Stop reason: HOSPADM

## 2019-07-17 RX ORDER — DIPHENHYDRAMINE HYDROCHLORIDE 50 MG/ML
12.5 INJECTION INTRAMUSCULAR; INTRAVENOUS
Status: DISCONTINUED | OUTPATIENT
Start: 2019-07-17 | End: 2019-07-17 | Stop reason: HOSPADM

## 2019-07-17 RX ORDER — SODIUM CHLORIDE 0.9 % (FLUSH) 0.9 %
10 SYRINGE (ML) INJECTION PRN
Status: DISCONTINUED | OUTPATIENT
Start: 2019-07-17 | End: 2019-07-18 | Stop reason: HOSPADM

## 2019-07-17 RX ORDER — OMEPRAZOLE 40 MG/1
40 CAPSULE, DELAYED RELEASE ORAL DAILY PRN
Qty: 14 CAPSULE | Refills: 0 | Status: SHIPPED | OUTPATIENT
Start: 2019-07-17 | End: 2019-11-04 | Stop reason: ALTCHOICE

## 2019-07-17 RX ORDER — ONDANSETRON 4 MG/1
4 TABLET, FILM COATED ORAL EVERY 8 HOURS PRN
Qty: 30 TABLET | Refills: 0 | Status: SHIPPED | OUTPATIENT
Start: 2019-07-17 | End: 2019-07-24

## 2019-07-17 RX ORDER — LIDOCAINE HYDROCHLORIDE 10 MG/ML
1 INJECTION, SOLUTION EPIDURAL; INFILTRATION; INTRACAUDAL; PERINEURAL
Status: DISCONTINUED | OUTPATIENT
Start: 2019-07-17 | End: 2019-07-17 | Stop reason: HOSPADM

## 2019-07-17 RX ORDER — HYDRALAZINE HYDROCHLORIDE 20 MG/ML
5 INJECTION INTRAMUSCULAR; INTRAVENOUS EVERY 10 MIN PRN
Status: DISCONTINUED | OUTPATIENT
Start: 2019-07-17 | End: 2019-07-17 | Stop reason: HOSPADM

## 2019-07-17 RX ORDER — FENTANYL CITRATE 50 UG/ML
INJECTION, SOLUTION INTRAMUSCULAR; INTRAVENOUS
Status: COMPLETED
Start: 2019-07-17 | End: 2019-07-17

## 2019-07-17 RX ORDER — PROPOFOL 10 MG/ML
INJECTION, EMULSION INTRAVENOUS PRN
Status: DISCONTINUED | OUTPATIENT
Start: 2019-07-17 | End: 2019-07-17 | Stop reason: SDUPTHER

## 2019-07-17 RX ORDER — SODIUM CHLORIDE, SODIUM LACTATE, POTASSIUM CHLORIDE, CALCIUM CHLORIDE 600; 310; 30; 20 MG/100ML; MG/100ML; MG/100ML; MG/100ML
INJECTION, SOLUTION INTRAVENOUS CONTINUOUS
Status: DISCONTINUED | OUTPATIENT
Start: 2019-07-17 | End: 2019-07-17

## 2019-07-17 RX ORDER — LIDOCAINE HYDROCHLORIDE 20 MG/ML
INJECTION, SOLUTION INTRAVENOUS PRN
Status: DISCONTINUED | OUTPATIENT
Start: 2019-07-17 | End: 2019-07-17 | Stop reason: SDUPTHER

## 2019-07-17 RX ORDER — LABETALOL 20 MG/4 ML (5 MG/ML) INTRAVENOUS SYRINGE
5 EVERY 10 MIN PRN
Status: DISCONTINUED | OUTPATIENT
Start: 2019-07-17 | End: 2019-07-17 | Stop reason: HOSPADM

## 2019-07-17 RX ORDER — FENTANYL CITRATE 50 UG/ML
25 INJECTION, SOLUTION INTRAMUSCULAR; INTRAVENOUS EVERY 5 MIN PRN
Status: DISCONTINUED | OUTPATIENT
Start: 2019-07-17 | End: 2019-07-17 | Stop reason: HOSPADM

## 2019-07-17 RX ORDER — OXYCODONE HYDROCHLORIDE 5 MG/1
10 TABLET ORAL EVERY 4 HOURS PRN
Status: DISCONTINUED | OUTPATIENT
Start: 2019-07-17 | End: 2019-07-18 | Stop reason: HOSPADM

## 2019-07-17 RX ORDER — VANCOMYCIN HYDROCHLORIDE 500 MG/10ML
INJECTION, POWDER, LYOPHILIZED, FOR SOLUTION INTRAVENOUS PRN
Status: DISCONTINUED | OUTPATIENT
Start: 2019-07-17 | End: 2019-07-17 | Stop reason: HOSPADM

## 2019-07-17 RX ORDER — DEXAMETHASONE SODIUM PHOSPHATE 4 MG/ML
INJECTION, SOLUTION INTRA-ARTICULAR; INTRALESIONAL; INTRAMUSCULAR; INTRAVENOUS; SOFT TISSUE PRN
Status: DISCONTINUED | OUTPATIENT
Start: 2019-07-17 | End: 2019-07-17 | Stop reason: SDUPTHER

## 2019-07-17 RX ORDER — PROMETHAZINE HYDROCHLORIDE 25 MG/ML
6.25 INJECTION, SOLUTION INTRAMUSCULAR; INTRAVENOUS
Status: COMPLETED | OUTPATIENT
Start: 2019-07-17 | End: 2019-07-17

## 2019-07-17 RX ORDER — OXYCODONE HYDROCHLORIDE 5 MG/1
5 TABLET ORAL EVERY 4 HOURS PRN
Status: DISCONTINUED | OUTPATIENT
Start: 2019-07-17 | End: 2019-07-18 | Stop reason: HOSPADM

## 2019-07-17 RX ORDER — ACETAMINOPHEN 325 MG/1
650 TABLET ORAL EVERY 6 HOURS
Status: DISCONTINUED | OUTPATIENT
Start: 2019-07-17 | End: 2019-07-18 | Stop reason: HOSPADM

## 2019-07-17 RX ORDER — OXYCODONE HYDROCHLORIDE AND ACETAMINOPHEN 5; 325 MG/1; MG/1
1 TABLET ORAL ONCE
Status: DISCONTINUED | OUTPATIENT
Start: 2019-07-17 | End: 2019-07-17 | Stop reason: HOSPADM

## 2019-07-17 RX ORDER — MEPERIDINE HYDROCHLORIDE 25 MG/ML
12.5 INJECTION INTRAMUSCULAR; INTRAVENOUS; SUBCUTANEOUS EVERY 5 MIN PRN
Status: DISCONTINUED | OUTPATIENT
Start: 2019-07-17 | End: 2019-07-17 | Stop reason: HOSPADM

## 2019-07-17 RX ORDER — DIAZEPAM 5 MG/1
5 TABLET ORAL EVERY 8 HOURS PRN
Status: DISCONTINUED | OUTPATIENT
Start: 2019-07-17 | End: 2019-07-18 | Stop reason: HOSPADM

## 2019-07-17 RX ADMIN — SODIUM CHLORIDE, SODIUM LACTATE, POTASSIUM CHLORIDE, AND CALCIUM CHLORIDE: 600; 310; 30; 20 INJECTION, SOLUTION INTRAVENOUS at 10:14

## 2019-07-17 RX ADMIN — SUGAMMADEX 50 MG: 100 INJECTION, SOLUTION INTRAVENOUS at 11:48

## 2019-07-17 RX ADMIN — SODIUM CHLORIDE, SODIUM LACTATE, POTASSIUM CHLORIDE, AND CALCIUM CHLORIDE: 600; 310; 30; 20 INJECTION, SOLUTION INTRAVENOUS at 08:45

## 2019-07-17 RX ADMIN — ONDANSETRON 4 MG: 2 INJECTION INTRAMUSCULAR; INTRAVENOUS at 10:30

## 2019-07-17 RX ADMIN — FENTANYL CITRATE 25 MCG: 50 INJECTION INTRAMUSCULAR; INTRAVENOUS at 13:29

## 2019-07-17 RX ADMIN — FENTANYL CITRATE 200 MCG: 50 INJECTION INTRAMUSCULAR; INTRAVENOUS at 10:45

## 2019-07-17 RX ADMIN — SUGAMMADEX 150 MG: 100 INJECTION, SOLUTION INTRAVENOUS at 12:35

## 2019-07-17 RX ADMIN — FENTANYL CITRATE 25 MCG: 50 INJECTION INTRAMUSCULAR; INTRAVENOUS at 13:16

## 2019-07-17 RX ADMIN — DEXAMETHASONE SODIUM PHOSPHATE 10 MG: 4 INJECTION, SOLUTION INTRAMUSCULAR; INTRAVENOUS at 10:30

## 2019-07-17 RX ADMIN — FENTANYL CITRATE 25 MCG: 50 INJECTION INTRAMUSCULAR; INTRAVENOUS at 13:05

## 2019-07-17 RX ADMIN — ACETAMINOPHEN 650 MG: 325 TABLET ORAL at 18:15

## 2019-07-17 RX ADMIN — KETOROLAC TROMETHAMINE 30 MG: 30 INJECTION, SOLUTION INTRAMUSCULAR; INTRAVENOUS at 11:50

## 2019-07-17 RX ADMIN — CEFAZOLIN 2 G: 10 INJECTION, POWDER, FOR SOLUTION INTRAVENOUS; PARENTERAL at 18:16

## 2019-07-17 RX ADMIN — TAMSULOSIN HYDROCHLORIDE 0.4 MG: 0.4 CAPSULE ORAL at 18:15

## 2019-07-17 RX ADMIN — LIDOCAINE HYDROCHLORIDE 100 MG: 20 INJECTION, SOLUTION INTRAVENOUS at 10:19

## 2019-07-17 RX ADMIN — ACETAMINOPHEN 650 MG: 325 TABLET ORAL at 23:46

## 2019-07-17 RX ADMIN — ROCURONIUM BROMIDE 50 MG: 10 INJECTION, SOLUTION INTRAVENOUS at 10:19

## 2019-07-17 RX ADMIN — FENTANYL CITRATE 25 MCG: 50 INJECTION INTRAMUSCULAR; INTRAVENOUS at 13:00

## 2019-07-17 RX ADMIN — GLYCOPYRROLATE 0.2 MG: 0.2 INJECTION INTRAMUSCULAR; INTRAVENOUS at 10:27

## 2019-07-17 RX ADMIN — OXYCODONE HYDROCHLORIDE 5 MG: 5 TABLET ORAL at 20:55

## 2019-07-17 RX ADMIN — MIDAZOLAM HYDROCHLORIDE 2 MG: 2 INJECTION, SOLUTION INTRAMUSCULAR; INTRAVENOUS at 10:14

## 2019-07-17 RX ADMIN — SENNOSIDES,DOCUSATE SODIUM 1 TABLET: 8.6; 5 TABLET, FILM COATED ORAL at 20:55

## 2019-07-17 RX ADMIN — PROMETHAZINE HYDROCHLORIDE 6.25 MG: 25 INJECTION INTRAMUSCULAR; INTRAVENOUS at 14:01

## 2019-07-17 RX ADMIN — CEFAZOLIN 2 G: 10 INJECTION, POWDER, FOR SOLUTION INTRAVENOUS; PARENTERAL at 10:40

## 2019-07-17 RX ADMIN — TRANEXAMIC ACID 1000 MG: 1 INJECTION, SOLUTION INTRAVENOUS at 11:48

## 2019-07-17 RX ADMIN — SODIUM CHLORIDE: 0.9 INJECTION, SOLUTION INTRAVENOUS at 17:30

## 2019-07-17 RX ADMIN — HYDROMORPHONE HYDROCHLORIDE 1 MG: 2 INJECTION, SOLUTION INTRAMUSCULAR; INTRAVENOUS; SUBCUTANEOUS at 11:53

## 2019-07-17 RX ADMIN — ASPIRIN 81 MG: 81 TABLET ORAL at 20:55

## 2019-07-17 RX ADMIN — PROPOFOL 200 MG: 10 INJECTION, EMULSION INTRAVENOUS at 10:19

## 2019-07-17 RX ADMIN — FENTANYL CITRATE 50 MCG: 50 INJECTION INTRAMUSCULAR; INTRAVENOUS at 11:54

## 2019-07-17 ASSESSMENT — PULMONARY FUNCTION TESTS
PIF_VALUE: 21
PIF_VALUE: 20
PIF_VALUE: 19
PIF_VALUE: 0
PIF_VALUE: 3
PIF_VALUE: 20
PIF_VALUE: 3
PIF_VALUE: 22
PIF_VALUE: 19
PIF_VALUE: 14
PIF_VALUE: 3
PIF_VALUE: 20
PIF_VALUE: 19
PIF_VALUE: 20
PIF_VALUE: 12
PIF_VALUE: 21
PIF_VALUE: 16
PIF_VALUE: 3
PIF_VALUE: 3
PIF_VALUE: 22
PIF_VALUE: 10
PIF_VALUE: 2
PIF_VALUE: 16
PIF_VALUE: 19
PIF_VALUE: 27
PIF_VALUE: 21
PIF_VALUE: 20
PIF_VALUE: 23
PIF_VALUE: 3
PIF_VALUE: 21
PIF_VALUE: 2
PIF_VALUE: 18
PIF_VALUE: 19
PIF_VALUE: 20
PIF_VALUE: 24
PIF_VALUE: 25
PIF_VALUE: 20
PIF_VALUE: 3
PIF_VALUE: 2
PIF_VALUE: 2
PIF_VALUE: 21
PIF_VALUE: 21
PIF_VALUE: 24
PIF_VALUE: 15
PIF_VALUE: 21
PIF_VALUE: 3
PIF_VALUE: 1
PIF_VALUE: 25
PIF_VALUE: 20
PIF_VALUE: 12
PIF_VALUE: 16
PIF_VALUE: 1
PIF_VALUE: 23
PIF_VALUE: 2
PIF_VALUE: 20
PIF_VALUE: 16
PIF_VALUE: 22
PIF_VALUE: 20
PIF_VALUE: 3
PIF_VALUE: 20
PIF_VALUE: 0
PIF_VALUE: 12
PIF_VALUE: 3
PIF_VALUE: 33
PIF_VALUE: 19
PIF_VALUE: 22
PIF_VALUE: 25
PIF_VALUE: 16
PIF_VALUE: 24
PIF_VALUE: 24
PIF_VALUE: 3
PIF_VALUE: 21
PIF_VALUE: 21
PIF_VALUE: 19
PIF_VALUE: 2
PIF_VALUE: 12
PIF_VALUE: 10
PIF_VALUE: 36
PIF_VALUE: 18
PIF_VALUE: 11
PIF_VALUE: 23
PIF_VALUE: 3
PIF_VALUE: 21
PIF_VALUE: 3
PIF_VALUE: 20
PIF_VALUE: 1
PIF_VALUE: 3
PIF_VALUE: 19
PIF_VALUE: 3
PIF_VALUE: 20
PIF_VALUE: 2
PIF_VALUE: 16
PIF_VALUE: 20
PIF_VALUE: 2
PIF_VALUE: 3
PIF_VALUE: 20
PIF_VALUE: 2
PIF_VALUE: 20
PIF_VALUE: 19
PIF_VALUE: 1
PIF_VALUE: 10
PIF_VALUE: 3
PIF_VALUE: 19
PIF_VALUE: 1
PIF_VALUE: 19
PIF_VALUE: 2
PIF_VALUE: 21
PIF_VALUE: 24
PIF_VALUE: 16
PIF_VALUE: 1
PIF_VALUE: 0
PIF_VALUE: 0
PIF_VALUE: 3
PIF_VALUE: 20
PIF_VALUE: 20
PIF_VALUE: 21
PIF_VALUE: 25
PIF_VALUE: 16
PIF_VALUE: 20
PIF_VALUE: 3
PIF_VALUE: 3
PIF_VALUE: 1
PIF_VALUE: 16
PIF_VALUE: 20
PIF_VALUE: 3
PIF_VALUE: 24
PIF_VALUE: 21
PIF_VALUE: 22
PIF_VALUE: 26
PIF_VALUE: 3
PIF_VALUE: 16
PIF_VALUE: 16
PIF_VALUE: 27
PIF_VALUE: 1
PIF_VALUE: 20
PIF_VALUE: 29
PIF_VALUE: 21
PIF_VALUE: 24
PIF_VALUE: 20
PIF_VALUE: 16
PIF_VALUE: 20
PIF_VALUE: 32
PIF_VALUE: 20

## 2019-07-17 ASSESSMENT — PAIN DESCRIPTION - ORIENTATION
ORIENTATION: RIGHT

## 2019-07-17 ASSESSMENT — PAIN SCALES - GENERAL
PAINLEVEL_OUTOF10: 3
PAINLEVEL_OUTOF10: 0
PAINLEVEL_OUTOF10: 5
PAINLEVEL_OUTOF10: 5
PAINLEVEL_OUTOF10: 6
PAINLEVEL_OUTOF10: 5
PAINLEVEL_OUTOF10: 4
PAINLEVEL_OUTOF10: 5
PAINLEVEL_OUTOF10: 4

## 2019-07-17 ASSESSMENT — PAIN DESCRIPTION - LOCATION
LOCATION: KNEE

## 2019-07-17 ASSESSMENT — PAIN DESCRIPTION - DESCRIPTORS
DESCRIPTORS: ACHING

## 2019-07-17 ASSESSMENT — PAIN DESCRIPTION - FREQUENCY
FREQUENCY: CONTINUOUS

## 2019-07-17 ASSESSMENT — PAIN DESCRIPTION - PAIN TYPE
TYPE: SURGICAL PAIN

## 2019-07-17 ASSESSMENT — PAIN DESCRIPTION - ONSET
ONSET: ON-GOING

## 2019-07-17 ASSESSMENT — PAIN DESCRIPTION - PROGRESSION
CLINICAL_PROGRESSION: GRADUALLY WORSENING
CLINICAL_PROGRESSION: NOT CHANGED
CLINICAL_PROGRESSION: NOT CHANGED

## 2019-07-17 ASSESSMENT — PAIN - FUNCTIONAL ASSESSMENT
PAIN_FUNCTIONAL_ASSESSMENT: PREVENTS OR INTERFERES SOME ACTIVE ACTIVITIES AND ADLS
PAIN_FUNCTIONAL_ASSESSMENT: PREVENTS OR INTERFERES SOME ACTIVE ACTIVITIES AND ADLS
PAIN_FUNCTIONAL_ASSESSMENT: 0-10

## 2019-07-17 NOTE — BRIEF OP NOTE
Brief Postoperative Note  ______________________________________________________________    Patient: Carlos Forbes  YOB: 1955  MRN: [id number]  Date of Procedure: 7/17/2019    Pre-Op Diagnosis: OSTEOARTHRITIS M17.11    Post-Op Diagnosis: Same       Procedure(s):  RIGHT TOTAL KNEE ARTHROPLASTY    Anesthesia: General    Surgeon(s):  MD Marcus See MD    Assistant: Emmanuel Jurado [facility]    Estimated Blood Loss (mL): 494     Complications: None    Specimens:   * No specimens in log *    Implants:  * No implants in log *      Drains: * No LDAs found *    Findings: see dictation    Marian Marquez MD  Date: 7/17/2019  Time: 12:11 PM

## 2019-07-17 NOTE — OP NOTE
[facility] [address]                                OPERATIVE REPORT    PATIENT NAME: Yvon Tellez                   :        1955  MED REC NO:   [id number]                          ROOM:  ACCOUNT NO:   [de-identified]                           ADMIT DATE: 2019  PROVIDER:     Guille Silva MD    DATE OF PROCEDURE:  2019    PREOPERATIVE DIAGNOSIS:  Tricompartmental osteoarthritis, right knee. POSTOPERATIVE DIAGNOSIS:  Tricompartmental osteoarthritis, right knee. OPERATIVE PROCEDURE:  Right total knee joint replacement with Journey II  posterior cruciate-sacrificing Smith and Nephew system utilizing  patient-matched instrumentation, #4 femur, #3 tibia, and 32 x 7 mm  patella insert with 12 mm polyethylene thickness insert. SURGEON:  Guille Silva MD    FIRST ASSISTANT:  Dr. Herminio Fenton. ANESTHESIA:  General.    OPERATIVE INDICATIONS:  This patient understood the associated risks and  benefits and consented to the operative procedure, having undergone a  prior nonoperative treatment program and with progression of symptoms  affecting all activities of daily living. She asked for and did have  instructions and orientation on total knee replacement and proceeded  with the procedure. OPERATIVE FINDINGS:  An excellent implantation of total knee joint  system utilizing patient-specific instrumentation. There were no  complications. An orthopedic surgeon did assist me during the time,  which decreased operative time and increased operative efficiency. DESCRIPTION OF OPERATIVE PROCEDURE:  This patient was placed in supine  position upon the operating room table, and after satisfactory level of  general anesthesia, the right knee was prepped and draped to sterile  surgical field after identification of the signed right limb and the  time-out procedure.   Under tourniquet control, a medial

## 2019-07-17 NOTE — H&P
drinks     Frequency: 2-4 times a month     Drinks per session: 1 or 2     Comment: I drink only occasionally, less than once a month.  Drug use: No    Sexual activity: Yes     Partners: Male   Lifestyle    Physical activity:     Days per week: None     Minutes per session: None    Stress: None   Relationships    Social connections:     Talks on phone: None     Gets together: None     Attends Cheondoism service: None     Active member of club or organization: None     Attends meetings of clubs or organizations: None     Relationship status: None    Intimate partner violence:     Fear of current or ex partner: None     Emotionally abused: None     Physically abused: None     Forced sexual activity: None   Other Topics Concern    None   Social History Narrative    Lives in [place] with . 3 daughters ages 22,19, 29         Medications Prior to Admission:      Prior to Admission medications    Medication Sig Start Date End Date Taking? Authorizing Provider   levothyroxine (SYNTHROID) 100 MCG tablet TAKE ONE TABLET BY MOUTH DAILY 5/30/19  Yes Sarah Vanessa, DO   Multiple Vitamins-Minerals (MULTIVITAMIN PO) Take  by mouth daily. Yes Historical Provider, MD   Calcium Carbonate-Vit D-Min (CALTRATE 600+D PLUS) 600-400 MG-UNIT TABS Take  by mouth.  Indications: one by mouth twice a day   Yes Historical Provider, MD   Omega-3 Fat Ac-Cholecalciferol (DRY EYE OMEGA BENEFITS/VIT D-3) 622-832 MG-UNIT CAPS 2 capsules by mouth twice daily before meals 3/21/19   Historical Provider, MD   Propylene Glycol (SYSTANE COMPLETE) 0.6 % SOLN instill 1 Drop by Ophthalmic route 3-4 times every day OU 3/21/19   Historical Provider, MD   amLODIPine (NORVASC) 5 MG tablet TAKE ONE TABLET BY MOUTH DAILY 6/22/19   Sarah Vanessa, DO   lisinopril-hydrochlorothiazide (PRINZIDE;ZESTORETIC) 10-12.5 MG per tablet TAKE ONE TABLET BY MOUTH DAILY 5/30/19   Smithlatonya Vanessa, DO         Allergies:  Tussionex pennkinetic er Natalya fernández-cpm

## 2019-07-17 NOTE — ANESTHESIA PRE PROCEDURE
Department of Anesthesiology  Preprocedure Note       Name:  Carlos Forbes   Age:  59 y.o.  :  1955                                          MRN:  [id number]         Date:  2019      Surgeon: Ravin Perez):  Marcus Gilbert MD    Procedure: RIGHT TOTAL KNEE ARTHROPLASTY (Right )    Medications prior to admission:   Prior to Admission medications    Medication Sig Start Date End Date Taking? Authorizing Provider   aspirin EC 81 MG EC tablet Take 1 tablet by mouth 2 times daily 19 Yes Marian Marquez MD   omeprazole (PRILOSEC) 40 MG delayed release capsule Take 1 capsule by mouth daily as needed (Heartburn, reflux) 19 Yes Marian Marquez MD   ondansetron [facility]) 4 MG tablet Take 1 tablet by mouth every 8 hours as needed for Nausea or Vomiting 19 Yes Marian Marquez MD   oxyCODONE-acetaminophen (PERCOCET) 5-325 MG per tablet Take 1-2 tablets by mouth every 6 hours as needed for Pain for up to 7 days. Take 1-2 tablets by mouth every 6 hours as needed for pain. Medically necessary due to recent surgery 19 Yes Marian Marquez MD   senna-docusate (PERICOLACE) 8.6-50 MG per tablet Take 2 tablets by mouth daily as needed for Constipation 19 Yes Marian Marquez MD   levothyroxine (SYNTHROID) 100 MCG tablet TAKE ONE TABLET BY MOUTH DAILY 19  Yes Nazanin Mabry, DO   Multiple Vitamins-Minerals (MULTIVITAMIN PO) Take  by mouth daily. Yes Historical Provider, MD   Calcium Carbonate-Vit D-Min (CALTRATE 600+D PLUS) 600-400 MG-UNIT TABS Take  by mouth.  Indications: one by mouth twice a day   Yes Historical Provider, MD   Omega-3 Fat Ac-Cholecalciferol (DRY EYE OMEGA BENEFITS/VIT D-3) 204-619 MG-UNIT CAPS 2 capsules by mouth twice daily before meals 3/21/19   Historical Provider, MD   Propylene Glycol (SYSTANE COMPLETE) 0.6 % SOLN instill 1 Drop by Ophthalmic route 3-4 times every day OU 3/21/19   Historical Provider, MD   amLODIPine (NORVASC) 5 MG tablet

## 2019-07-18 VITALS
HEIGHT: 62 IN | OXYGEN SATURATION: 95 % | HEART RATE: 58 BPM | BODY MASS INDEX: 30.36 KG/M2 | TEMPERATURE: 97.6 F | SYSTOLIC BLOOD PRESSURE: 114 MMHG | RESPIRATION RATE: 18 BRPM | DIASTOLIC BLOOD PRESSURE: 67 MMHG | WEIGHT: 165 LBS

## 2019-07-18 PROCEDURE — 6360000002 HC RX W HCPCS: Performed by: ORTHOPAEDIC SURGERY

## 2019-07-18 PROCEDURE — 97530 THERAPEUTIC ACTIVITIES: CPT

## 2019-07-18 PROCEDURE — 97535 SELF CARE MNGMENT TRAINING: CPT

## 2019-07-18 PROCEDURE — 97161 PT EVAL LOW COMPLEX 20 MIN: CPT

## 2019-07-18 PROCEDURE — 2580000003 HC RX 258: Performed by: ORTHOPAEDIC SURGERY

## 2019-07-18 PROCEDURE — 97166 OT EVAL MOD COMPLEX 45 MIN: CPT

## 2019-07-18 PROCEDURE — 6370000000 HC RX 637 (ALT 250 FOR IP): Performed by: ORTHOPAEDIC SURGERY

## 2019-07-18 PROCEDURE — 97116 GAIT TRAINING THERAPY: CPT

## 2019-07-18 RX ADMIN — TAMSULOSIN HYDROCHLORIDE 0.4 MG: 0.4 CAPSULE ORAL at 08:01

## 2019-07-18 RX ADMIN — Medication 10 ML: at 08:01

## 2019-07-18 RX ADMIN — SENNOSIDES,DOCUSATE SODIUM 1 TABLET: 8.6; 5 TABLET, FILM COATED ORAL at 08:00

## 2019-07-18 RX ADMIN — AMLODIPINE BESYLATE 5 MG: 5 TABLET ORAL at 08:01

## 2019-07-18 RX ADMIN — LISINOPRIL AND HYDROCHLOROTHIAZIDE 1 TABLET: 12.5; 1 TABLET ORAL at 08:01

## 2019-07-18 RX ADMIN — ACETAMINOPHEN 650 MG: 325 TABLET ORAL at 06:22

## 2019-07-18 RX ADMIN — ONDANSETRON 4 MG: 2 INJECTION INTRAMUSCULAR; INTRAVENOUS at 08:19

## 2019-07-18 RX ADMIN — CEFAZOLIN 2 G: 10 INJECTION, POWDER, FOR SOLUTION INTRAVENOUS; PARENTERAL at 02:35

## 2019-07-18 RX ADMIN — ACETAMINOPHEN 650 MG: 325 TABLET ORAL at 11:12

## 2019-07-18 RX ADMIN — OXYCODONE HYDROCHLORIDE 5 MG: 5 TABLET ORAL at 11:49

## 2019-07-18 RX ADMIN — OXYCODONE HYDROCHLORIDE 10 MG: 5 TABLET ORAL at 08:12

## 2019-07-18 RX ADMIN — LEVOTHYROXINE SODIUM 100 MCG: 100 TABLET ORAL at 08:01

## 2019-07-18 RX ADMIN — POLYETHYLENE GLYCOL (3350) 17 G: 17 POWDER, FOR SOLUTION ORAL at 08:01

## 2019-07-18 RX ADMIN — ASPIRIN 81 MG: 81 TABLET ORAL at 08:01

## 2019-07-18 ASSESSMENT — PAIN DESCRIPTION - PROGRESSION
CLINICAL_PROGRESSION: NOT CHANGED
CLINICAL_PROGRESSION: GRADUALLY WORSENING

## 2019-07-18 ASSESSMENT — PAIN SCALES - GENERAL
PAINLEVEL_OUTOF10: 0
PAINLEVEL_OUTOF10: 5
PAINLEVEL_OUTOF10: 4
PAINLEVEL_OUTOF10: 0
PAINLEVEL_OUTOF10: 4
PAINLEVEL_OUTOF10: 7
PAINLEVEL_OUTOF10: 5
PAINLEVEL_OUTOF10: 0

## 2019-07-18 ASSESSMENT — PAIN DESCRIPTION - DESCRIPTORS
DESCRIPTORS: ACHING

## 2019-07-18 ASSESSMENT — PAIN DESCRIPTION - PAIN TYPE
TYPE: SURGICAL PAIN

## 2019-07-18 ASSESSMENT — PAIN DESCRIPTION - ONSET
ONSET: ON-GOING

## 2019-07-18 ASSESSMENT — PAIN DESCRIPTION - FREQUENCY
FREQUENCY: CONTINUOUS

## 2019-07-18 ASSESSMENT — PAIN DESCRIPTION - LOCATION
LOCATION: KNEE

## 2019-07-18 ASSESSMENT — PAIN DESCRIPTION - ORIENTATION
ORIENTATION: RIGHT

## 2019-07-18 NOTE — CARE COORDINATION
Case Management Assessment           Initial Evaluation                Date / Time of Evaluation: 7/18/2019 12:45 PM                 Assessment Completed by: Brandon Kumar    Patient Name: Deion Willett     YOB: 1955  Diagnosis: Primary osteoarthritis of right knee [M17.11]     Date / Time: 7/17/2019  7:42 AM    Patient Admission Status: Inpatient    If patient is discharged prior to next notation, then this note serves as note for discharge by case management. Current PCP: Ciera Carrillo MD  Clinic Patient: No    Chart Reviewed: Yes  Patient/ Family Interviewed: Yes    Initial assessment completed at bedside with: patient    Hospitalization in the last 30 days: No    Emergency Contacts:  Extended Emergency Contact Information  Primary Emergency Contact: Robert Lyon  Address: [address] [place] [address] Pass, Gonsalo Cartwright [address] Phone: [phone]  Mobile Phone: [phone]  Relation: Spouse  Secondary Emergency Contact: [address] Phone: [phone]  Mobile Phone: [phone]  Relation: Child    Advance Directives:   Code Status: Full [address]: No  Agent: NA  Contact Number: NA    Copy present: No     In paper Chart: No    Scanned into EMR No    Financial  Payor: [place] [employer] / Plan: Arianne Rodriguez PPO / Product Type: *No Product type* /     Pre-cert required for SNF: No    Pharmacy    [address] [place] [address] [address], [address] [place] [address] [phone]  [address] [facility] [address]  Phone: [phone] Fax: [phone]      Potential assistance Purchasing Medications: Potential Assistance Purchasing Medications: No  Does Patient want to participate in local refill/ meds to beds program?: Yes    Meds To Beds General Rules:  1. Can ONLY be done Monday- Friday between 8:30am-5pm  2. Prescription(s) must be in pharmacy by 3pm to be filled same day  3. Copy of patient's insurance/ prescription drug card and patient face sheet must be sent along with the prescription(s)  4. Cost of Rx cannot be added to hospital bill. If financial assistance is needed, please contact unit  or ;  or  CANNOT provide pharmacy voucher for patients co-pays  5. Patients can then  the prescription on their way out of the hospital at discharge, or pharmacy can deliver to the bedside if staff is available. (payment due at time of pick-up or delivery - cash, check, or card accepted)     Able to afford home medications/ co-pay costs: Yes    ADLS  Support Systems: Spouse/Significant Other, Children, Family Members    PT AM-PAC: 18 /24  OT AM-PAC: 21 /24    [place]: two level home  Steps: 1 step    Plans to RETURN to current housing: Yes  Barriers to RETURNING to current housing: none noted        DISCHARGE PLAN:  Disposition: Outpatient Therapy    Transportation PLAN for discharge: family     Factors facilitating achievement of predicted outcomes: Family support, Cooperative and Pleasant    Barriers to discharge: none noted    Additional Case Management Notes: pt verbalized no needs at d/c. Pt has a walker at home already    Fabricio Schwab and her family were provided with choice of provider; she and her family are in agreement with the discharge plan.     Care Transition patient: No    Dino Cotton RN  [facility] [employer]  Case Management Department  Ph: [phone]   Fax: [phone]

## 2019-07-18 NOTE — DISCHARGE SUMMARY
weekends. ATTENTION    IMPORTANT NARCOTIC INFORMATION: PLEASE READ     [x] DO NOT share your prescription medication with anyone! Sharing is illegal.  The prescription dose is based on your age, weight, and health problems. Sharing your narcotic prescription can lead to accidental death of the individual for which the prescription was not prescribed. You may not know about his/her addiction problem. [x] Always use the same pharmacy when filling your narcotic prescriptions. [x] DO NOT mix your narcotic prescription with alcohol. Mixing the narcotic prescription medication with alcohol causes depressive effects including breathing problems, organ malfunction, and cardiac arrest.     [x] Always keep your narcotic medication in a locked, secured location. Keep your medication private. This is to avoid individuals from taking your medication without your knowledge. This medication is highly sought after and locking your prescriptions will protect you from being a target of crime. [x] DO NOT stock pile your medication. This also will protect you from being a target of crime. [x] Dispose of any unused medications properly. Do not flush the medications. Look for appropriate waste collection programs in your community and drug take back events. [x] DO NOT drive while taking narcotic pain medication or muscle relaxers. Rose Mary Come being given increased dosage/quantity of opioid pain medication in excess of OSM[employer] guidelines which noted a 30 MED of opioids due to the fact that she has undergone major orthopaedic surgery as outlined in rule 4731-11-13. Dosages and further duration of the pain medication will be re-evaluated at her post op visit. @ was educated on dosing expectations and limits of prescribing as a result of the new [facility] Board rules enacted August 31, 2017.   Please also note that this is not the initial opioid prescription issued to her but a

## 2019-07-18 NOTE — CARE COORDINATION
[address] Transitions Interview     2019    Patient: Timbo Soriano Patient : 1955   MRN: [id number]   Reason for Admission: R. Total Knee       RARS: Readmission Risk Score: 7         Spoke with: Timbo Soriano        Readmission Risk  Patient Active Problem List   Diagnosis    Essential hypertension    Acquired hypothyroidism    Hyperlipidemia    Knee pain, right    Skin lesion of face    Foot pain    DJD (degenerative joint disease) of knee    Menopause    Osteopenia    Prediabetes    Endometrial polyp    Goiter    Bradycardia    Asystole (HCC)    PAC (premature atrial contraction)    PVC (premature ventricular contraction)    Palpitations    Abnormal ECG    Snoring    Primary osteoarthritis of both knees    Stressful life events affecting family and household    Obstructive sleep apnea syndrome    Hypovitaminosis D    Primary osteoarthritis of right knee       Inpatient Assessment  Care Transitions Summary    Care Transitions Inpatient Review  Medication Review  Are you able to afford your medications?:  Yes  How often do you have difficulty taking your medications?:  I always take them as prescribed. Housing Review  Who do you live with?:  Partner/Spouse/SO  Are you an active caregiver in your home?:  No  Social Support  Do you have a ?:  No  Do you have a [address] [place] [address]?:  No  Durable Medical Equipment  Patient DME:  Lianne hussein  Functional Review  Ability to seek help/take action for Emergent/Urgent situations i.e. fire, crime, inclement weather or health crisis. :  Independent  Ability handle personal hygiene needs (bathing/dressing/grooming): Independent  Ability to manage medications: Independent  Ability to prepare food:  Independent  Ability to maintain home (clean home, laundry):   Independent  Ability to drive and/or has transportation:  Independent  Ability to do shopping:  Independent  Ability to manage

## 2019-07-19 ENCOUNTER — CARE COORDINATION (OUTPATIENT)
Dept: CASE MANAGEMENT | Age: 64
End: 2019-07-19

## 2019-07-19 ENCOUNTER — HOSPITAL ENCOUNTER (OUTPATIENT)
Dept: PHYSICAL THERAPY | Age: 64
Setting detail: THERAPIES SERIES
Discharge: HOME | End: 2019-07-19
Payer: COMMERCIAL

## 2019-07-19 DIAGNOSIS — Z96.651 S/P TKR (TOTAL KNEE REPLACEMENT), RIGHT: Primary | ICD-10-CM

## 2019-07-19 PROCEDURE — 97016 VASOPNEUMATIC DEVICE THERAPY: CPT | Performed by: PHYSICAL THERAPIST

## 2019-07-19 PROCEDURE — G0283 ELEC STIM OTHER THAN WOUND: HCPCS | Performed by: PHYSICAL THERAPIST

## 2019-07-19 PROCEDURE — 97140 MANUAL THERAPY 1/> REGIONS: CPT | Performed by: PHYSICAL THERAPIST

## 2019-07-19 PROCEDURE — 97110 THERAPEUTIC EXERCISES: CPT | Performed by: PHYSICAL THERAPIST

## 2019-07-19 PROCEDURE — 97530 THERAPEUTIC ACTIVITIES: CPT | Performed by: PHYSICAL THERAPIST

## 2019-07-19 NOTE — FLOWSHEET NOTE
7/23/19.  (+) giving way/ pain; apprehension  (+) night pain  (-) popping  (-)catching/locking/  (+) swelling  (+) stiffness/  (+) stairs/   (NO) kneeling/squatting// avoids due to PO       OBJECTIVE:       LEFS Score: 29/ 80= 36% (7/16/19; Preop)  LEFS Score: 4/ 80= 5% (7/19/19; Postop)     7-19-19            ROM PROM AROM Overpressure Comment     L R L R L R     Flexion 120 70          Manual   Extension 0 -10                                                    7-19-19  Strength L R Comment   Quad 4+/5 2/5 (pain) ANABEL 0°   Hamstring         Gastroc         Hip  flexion         Hip abd                                7-19-19  Special Test Results/Comment   Homans (-)   Temperature (-)  Initial 100.0 deg F-> 99.2 deg F      7-19-19  Girth L R   Mid Patella 44.0 44.2   Suprapatellar 44.6 45.3   5cm above 46.0 47.3   15cm above          Reflexes/Sensation:               []Dermatomes/Myotomes intact               []Reflexes equal and normal bilaterally              [x]Other:  NT     Joint mobility:                [x]Normal               []Hypo              []Hyper    Palpation: Generalized due to PO pain and swelling     Functional Mobility/Transfers: Requires moderate assist of 1 for R LE transfer from floor to table     Posture: L genu varum; R neutral valgus     Bandages/Dressings/Incisions: PO bandage change;  Aquacell dressing clean intact; (-) redness; bleeding     Gait: ~25% PWB with rolling walker; antalgia due to flexed knee stance and PO pain      Orthopedic Special Tests:      TEST INITIAL  7-16-19   GOAL   SINGLE LEG STANCE TIME L:4.67 sec     R:3.24 sec   >25 SECONDS   TIMED UP And GO 12.24 sec   61-75 y/o: <9sec  66-78 y/o: <10sec  80-81 y/o: <12 sec                         RESTRICTIONS/PRECAUTIONS: Bilateral knee OA    Exercises/Interventions:   Exercise/Equipment Resistance/Repetitions Other comments 7/19/2019   Stretching      Hamstring 30\"x 5 Propped x   Hip Flexion      HealthLoop swelling/ limited ROM/ difficulty walking/ LE muscle weakness; LE balance deficits impacting standing/ walking for ADL and job related tasks.     Prognosis: [x] Good [x] Fair  [] Poor    Patient Requires Follow-up: [x] Yes  [] No    PLAN:   [] Continue per plan of care [] Alter current plan (see comments)  [x] Plan of care initiated [] Hold pending MD visit [] Discharge  Frequency/Duration:  2 days per week for 12 Weeks:  Interventions:    Bandage and dressing check  Initial PO protocol for R knee TKR    Electronically signed by: Nicki Salazar PT

## 2019-07-19 NOTE — PROGRESS NOTES
Review of Systems    Last updated on 12/19/18  No change since then.
program [facility]) and it was checked for errors. It is possible that there are still dictated errors within this office note. If so, please bring any errors to my attention for an addendum. All efforts were made to ensure that this office note is accurate. This dictation was performed with a verbal recognition program (DRAGON) and it was checked for errors. It is possible that there are still dictated errors within this office note. If so, please bring any errors to my attention for an addendum. All efforts were made to ensure that this office note is accurate. Supervising Physician Attestation:  I, Dr. Belgica Dixon, personally performed the services described in this documentation as scribed above, and it is both accurate and complete and I agree with all pertinent clinical information. I personally interviewed the patient and performed a physical examination and medical decision making. I discussed the patient's condition and treatment options and have  reviewed and agree with the past medical, family and social history unless otherwise noted. All of the patient's questions were answered.       Board Certified Orthopaedic Surgeon  [facility] and [address]  President and [address] and [employer]  Professor of [address]

## 2019-07-19 NOTE — CARE COORDINATION
2/10 on pain scale. CTN encouraged patient to continue to take all medications as prescribed, use Ice packs as needed, applying barrier between compress and skin and drink plenty of fluids. Patient had first Outpatient Therapy session today, was able to tolerate it well. CTN and patient completed 1111F, all medications filled and in home. Follow up appointments have been scheduled already. CTN advised Pt of use of urgent care or physicians 24 hr access line if assistance is needed after hours or CTN weekend. CTN provided education on s/s that require medical attention and when to seek medical attention. Pt denies any needs or concerns and is agreeable with additional calls.     Follow Up  Future Appointments   Date Time Provider Mian Love   7/23/2019  1:30 PM ADRIANO [facility] PT None   7/26/2019  9:30 AM ADRIANO [place] [facility] PT None   8/12/2019  8:00 AM Cortez Newby MD [facility]   11/27/2019  2:00 PM John Newton MD [facility]   1/13/2020  8:00 AM COLLEEN Mobley - CNP FF SLEEP MED [facility]       Joycelyn Dubois RN

## 2019-07-22 NOTE — PROGRESS NOTES
CLINICAL PHARMACY NOTE: MEDS TO [address] Select Patient?: Yes  Total # of Prescriptions Filled: 5   The following medications were delivered to the patient:  · On file  Total # of Interventions Completed: 1  Time Spent (min): 30    Additional Documentation:
Patient meets DC criteria; awaiting room assignment.
Patient was disconnected from monitors after report was given to [facility]. Patient was taken to bathroom via ; pt tolerated movement well. Taken to room via [facility].
Physical Therapy/Occupational Therapy  Hold  To PACU this PM to evaluate pt. Per RN, pt was given phenergan for nausea, currently very sleepy, and unable to participate at this time. Will initiate therapies when pt is able to participate. Recommended that pt get up with nursing staff this PM as shellie Cruz PT  [facility], OTR/L, 7997
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 Objective   Vision: Impaired  Vision Exceptions: Wears glasses at all times  Hearing: Within functional limits    Orientation  Overall Orientation Status: Within Functional Limits     Balance  Standing Balance: Stand by assistance  Standing Balance  Time: 3+3+5 mins  Activity: functional mobility in room, standing for ADLs and grooming, functional mobility in hallway  Comment: with RW  Functional Mobility  Functional - Mobility Device: Rolling Walker  Activity: To/from bathroom; Other(in hallway)  Assist Level: Stand by assistance  Toilet Transfers  Toilet - Technique: Ambulating  Equipment Used: Standard bedside commode(+GB)  Toilet Transfer: Stand by assistance  ADL  Grooming: Stand by assistance(standing at sink for hand hygiene)  UE Dressing: Setup  LE Dressing: Increased time to complete;Stand by assistance(increased time to thread R LE due to reported pain with trunk flexion, however able to complete with SBA)  Toileting: Stand by assistance           Transfers  Sit to stand: Stand by assistance  Stand to sit: Stand by assistance     Cognition  Overall Cognitive Status: WFL                 LUE AROM (degrees)  LUE AROM : WFL  Left Hand AROM (degrees)  Left Hand AROM: WFL  RUE AROM (degrees)  RUE AROM : WFL  Right Hand AROM (degrees)  Right Hand AROM: WFL  LUE Strength  Gross LUE Strength: WFL  RUE Strength  Gross RUE Strength: WFL          Treatment included functional transfer training, ADLs, and patient education.       AM-PAC Score        AM-PAC Inpatient Daily Activity
150' with RW and supervision   Patient Goals   Patient goals : return home       Therapy Time   Individual Concurrent Group Co-treatment   Time In 0800         Time Out 0842         Minutes 42           Timed Code Treatment Minutes:  27    Total Treatment Minutes:  42    If the patient is discharged before the next treatment session, this note will serve as the discharge summary.      Faviola Franco, PT, DPT [id number]

## 2019-07-23 ENCOUNTER — HOSPITAL ENCOUNTER (OUTPATIENT)
Dept: PHYSICAL THERAPY | Age: 64
Setting detail: THERAPIES SERIES
Discharge: HOME | End: 2019-07-23
Payer: COMMERCIAL

## 2019-07-23 PROCEDURE — 97140 MANUAL THERAPY 1/> REGIONS: CPT | Performed by: PHYSICAL THERAPIST

## 2019-07-23 PROCEDURE — 97016 VASOPNEUMATIC DEVICE THERAPY: CPT | Performed by: PHYSICAL THERAPIST

## 2019-07-23 PROCEDURE — 97530 THERAPEUTIC ACTIVITIES: CPT | Performed by: PHYSICAL THERAPIST

## 2019-07-23 PROCEDURE — G0283 ELEC STIM OTHER THAN WOUND: HCPCS | Performed by: PHYSICAL THERAPIST

## 2019-07-23 PROCEDURE — 97110 THERAPEUTIC EXERCISES: CPT | Performed by: PHYSICAL THERAPIST

## 2019-07-23 NOTE — FLOWSHEET NOTE
[address] [facility]    Physical Therapy Daily Treatment Note  Date:  2019    Patient Name:  Alessia Esposito    :  1955  MRN: [id number]  Restrictions/Precautions:    Medical/Treatment Diagnosis Information:  Diagnosis: M17.11 (ICD-10-CM) - Osteoarthritis of right knee, unspecified osteoarthritis type      Treatment Diagnosis: Knee pain/ swelling/ difficulty walking/ limited ROM/ LE muscle weakness/ LE balance deficit   S/P R knee TKR  DOS: 19  Current PO meds: Percocet 1 tab QID;  Tylenol PRN; Colace PRN; ASA 81 mg BID; anti-nausea meds (patient unsure of type) PRN    Insurance/Certification information:   ANTHEM/ EFF 18/ IND ; / IND OOP MAX 3600; .76/ CO-INS COV 80%/ OOP 20%/ 30 VPCY/ NO AUTH EULOGIO/ HUNTER/ REF # [id number] USED 2 VISITS PREV/ PAG/ 19  Physician Information:   Maged Gan MD  Plan of care signed (Y/N): Yes    Date of Patient follow up with Physician: 19 for initial PO PT with FU for MD/ PT coordinated appointment on 19  Patient seen in consultation with Dr. Kelly Weiner who established the initial/subsequent treatment protocol.  19: Noted R medial calf pain, planning for Doppler US; continue work on knee extension; excellent TKR procedure; should do very well    G-Code (if applicable):      Date G-Code Applied:  19    Functional Disability Index:    LEFS Score: 4/ 80= 5% (Postop)    Progress Note: [x]  Yes  []  No  Next due by: Visit #10       Latex Allergy:  [x]NO      []YES  Preferred Language for Healthcare:   [x]English       []other:    Visit # Insurance Allowable   5 30     Pain Scale: 4-9/10  Easing factors: Rest; ice; meds  Provocative factors: Immediate PO pain and swelling; constant complaints   Type: [x]Constant           []Intermittent      []Radiating         []Localized         []other:                   SUBJECTIVE:   Presents for FU PO PT appointment using rolling

## 2019-07-24 DIAGNOSIS — M79.89 SWELLING OF LIMB: Primary | ICD-10-CM

## 2019-07-25 ENCOUNTER — CARE COORDINATION (OUTPATIENT)
Dept: CASE MANAGEMENT | Age: 64
End: 2019-07-25

## 2019-07-26 ENCOUNTER — HOSPITAL ENCOUNTER (OUTPATIENT)
Dept: PHYSICAL THERAPY | Age: 64
Setting detail: THERAPIES SERIES
Discharge: HOME | End: 2019-07-26
Payer: COMMERCIAL

## 2019-07-26 LAB
INR BLD: 1.5 (ref 0.8–1.2)
PROTHROMBIN TIME: 18.1 SECONDS (ref 11.7–14.2)

## 2019-07-26 PROCEDURE — 97530 THERAPEUTIC ACTIVITIES: CPT | Performed by: PHYSICAL THERAPIST

## 2019-07-26 PROCEDURE — 97140 MANUAL THERAPY 1/> REGIONS: CPT | Performed by: PHYSICAL THERAPIST

## 2019-07-26 PROCEDURE — 97110 THERAPEUTIC EXERCISES: CPT | Performed by: PHYSICAL THERAPIST

## 2019-07-26 PROCEDURE — G0283 ELEC STIM OTHER THAN WOUND: HCPCS | Performed by: PHYSICAL THERAPIST

## 2019-07-29 ENCOUNTER — HOSPITAL ENCOUNTER (OUTPATIENT)
Dept: PHYSICAL THERAPY | Age: 64
Setting detail: THERAPIES SERIES
Discharge: HOME | End: 2019-07-29
Payer: COMMERCIAL

## 2019-07-29 LAB
INR BLD: 2.4 (ref 0.8–1.2)
PROTHROMBIN TIME: 26.9 SECONDS (ref 11.7–14.2)

## 2019-07-29 PROCEDURE — G0283 ELEC STIM OTHER THAN WOUND: HCPCS | Performed by: PHYSICAL THERAPIST

## 2019-07-29 PROCEDURE — 97110 THERAPEUTIC EXERCISES: CPT | Performed by: PHYSICAL THERAPIST

## 2019-07-29 PROCEDURE — 97140 MANUAL THERAPY 1/> REGIONS: CPT | Performed by: PHYSICAL THERAPIST

## 2019-07-29 NOTE — FLOWSHEET NOTE
trying to cut back on pain medication, when the medication wears off she feels mostly achy. She is taking 2 percocet/day. Does take ibuprofen between doses of pain medication.  Pt is still taking warfarin.     (+) giving way/ pain; apprehension  (+) night pain  (-) popping  (-)catching/locking/  (+) swelling  (+) stiffness/  (+) stairs/   (NO) kneeling/squatting// avoids due to PO       OBJECTIVE:       LEFS Score: 29/ 80= 36% (7/16/19; Preop)  LEFS Score: 4/ 80= 5% (7/19/19; Postop)     7-26-19            ROM PROM AROM Overpressure Comment     L R L R L R     Flexion 120 96          Manual   Extension 0 -10                                                    7-26-19  Strength L R Comment   Quad 4+/5 2/5 (pain) ANABEL 0°   Hamstring         Gastroc         Hip  flexion         Hip abd                                7-26-19  Special Test Results/Comment   Homans (-)  (+) medial calf tenderness   Temperature (-)      7-19-19  Girth L R   Mid Patella 44.0 44.2   Suprapatellar 44.6 45.3   5cm above 46.0 47.3   15cm above          Reflexes/Sensation:               []Dermatomes/Myotomes intact               []Reflexes equal and normal bilaterally              [x]Other:  NT     Joint mobility:                [x]Normal               []Hypo              []Hyper    Palpation: Mild medial calf tenderness     Functional Mobility/Transfers: Requires moderate-> mild assist of 1 for R LE transfer from floor to table     Posture: L genu varum; R neutral valgus; R LE ~3/8\" long (plus more due to flexed knee posture)     Bandages/Dressings/Incisions: Aquacell dressing removed; steri strips changed; (-) redness; bleeding     Gait: ~25-50% PWB with rolling walker; antalgia due to flexed knee stance and PO pain      Orthopedic Special Tests:      TEST INITIAL  7-16-19   GOAL   SINGLE LEG STANCE TIME L:4.67 sec     R:3.24 sec   >25 SECONDS   TIMED UP And GO 12.24 sec   61-75 y/o: <9sec  66-78 y/o: <10sec  80-79 y/o: <12 sec     [x]Good                 [x]Fair              []Poor     Tolerance of evaluation/treatment:   [] Patient tolerated treatment well [] Patient limited by fatique  [x] Patient limited by pain  [] Patient limited by other medical complications  [x] Other: Pt lacking 10 deg of extension upon arrival to PT. Requires VCs to keep knee relaxed during extension stretching and to note let knee \"pop up. \" Good patellar mobility, only mild restrictions noted, pt stated that mobs felt good. Added 5# to long sitting HS stretch, pt reported increased discomfort but was able to tolerate the weight, advised pt to find something at home equal to 5# to put on knee during stretching program. Extension improved by 3 deg following stretching. Pt has difficulty performing quad set without glute compensation. Good tolerance to addition of bike. Very good improvement in ERMI flex to 104 deg. Performed VMS burst at end of session instead of hi-volt to focus on quad recruitment. Continues to report high level of pain with extension propping at end of session but able to tolerate full 15'. Pt requires PT follow up to address ROM, strength and functional mobility deficits. Continue per POC of primary PT. Prognosis: [x] Good [x] Fair  [] Poor    Patient Requires Follow-up: [x] Yes  [] No    PLAN:   [] Continue per plan of care [] Alter current plan (see comments)  [x] Plan of care initiated [] Hold pending MD visit [] Discharge  Frequency/Duration:  2 days per week for 12 Weeks:  Interventions:    Bandage and dressing check  Initial PO protocol for R knee TKR    Electronically signed by: Faviola Hayes PT, DPT  Physical Therapist  [email or web address]  [email or web address]    *Note: If patient does not return for scheduled / recommended follow up visit, this note will serve as their discharge note from physical therapy.

## 2019-07-31 ENCOUNTER — CARE COORDINATION (OUTPATIENT)
Dept: CASE MANAGEMENT | Age: 64
End: 2019-07-31

## 2019-08-01 ENCOUNTER — HOSPITAL ENCOUNTER (OUTPATIENT)
Dept: PHYSICAL THERAPY | Age: 64
Setting detail: THERAPIES SERIES
Discharge: HOME | End: 2019-08-01
Payer: COMMERCIAL

## 2019-08-01 DIAGNOSIS — G89.18 POST-OP PAIN: Primary | ICD-10-CM

## 2019-08-01 LAB
INR BLD: 4.1 (ref 0.8–1.2)
PROTHROMBIN TIME: 41.4 SECONDS (ref 11.7–14.2)

## 2019-08-01 PROCEDURE — 97110 THERAPEUTIC EXERCISES: CPT | Performed by: PHYSICAL THERAPY ASSISTANT

## 2019-08-01 PROCEDURE — G0283 ELEC STIM OTHER THAN WOUND: HCPCS | Performed by: PHYSICAL THERAPY ASSISTANT

## 2019-08-01 PROCEDURE — 97530 THERAPEUTIC ACTIVITIES: CPT | Performed by: PHYSICAL THERAPY ASSISTANT

## 2019-08-01 PROCEDURE — 97140 MANUAL THERAPY 1/> REGIONS: CPT | Performed by: PHYSICAL THERAPY ASSISTANT

## 2019-08-01 RX ORDER — OXYCODONE HYDROCHLORIDE AND ACETAMINOPHEN 5; 325 MG/1; MG/1
1 TABLET ORAL EVERY 8 HOURS PRN
Qty: 10 TABLET | Refills: 0 | Status: SHIPPED | OUTPATIENT
Start: 2019-08-01 | End: 2019-08-07

## 2019-08-01 NOTE — PROGRESS NOTES
Chief Complaint: Right knee pain    History of Present Illness:  Love Prieto is a 59 y.o. female who underwent a right total knee arthroplasty on 07/17/2019. Patient is doing fairly well postoperatively however she is still experiencing some postoperative pain 2 weeks status post a right total knee arthroplasty. At this time the patient is receiving pain medicine from the orthopedic surgery team (Dr. Richard Ortiz and myself) for postop surgical pain. Pain medication is being written on a weekly basis after a thorough discussion with the patient and reassessment of the patient's condition to verify that the patient is receiving the lowest possible dose to manage the pain. Jonathan Rose spoke with the patient today (8/1/19) and the patient states that they are doing much better with pain. They have been able to make further strides with home therapy and notes that their swelling has decreased. The patient feels that their pain is decreasing and is noticing improvements daily. Patient denies any signs or symptoms of drowsiness, loss of balance, euphoric sensation, or other symptoms of overuse or misuse of narcotic pain medication. The patient is being written pain medication for this major orthopedic surgery. It was reiterated with the patient that we can and will only right and pain medication for this procedure as directed by the [facility] for the 90 days postop from surgery. Patient was educated to on the appropriate utilization of narcotic pain medication. They were given appropriate precautions and restrictions that they should strictly adhere to while taking narcotic pain medication including not conducting any activity that could put them or others at risk of harm or death. Patient was understanding of all instructions and agreeable with this plan.       Narcotic  200  Sedative  090  Stimulant  000      Orders Placed This Encounter   Medications    oxyCODONE-acetaminophen

## 2019-08-05 LAB
INR BLD: 2.5 (ref 0.8–1.2)
PROTHROMBIN TIME: 27.8 SECONDS (ref 11.7–14.2)

## 2019-08-06 ENCOUNTER — HOSPITAL ENCOUNTER (OUTPATIENT)
Dept: PHYSICAL THERAPY | Age: 64
Setting detail: THERAPIES SERIES
Discharge: HOME | End: 2019-08-06
Payer: COMMERCIAL

## 2019-08-06 PROCEDURE — 97110 THERAPEUTIC EXERCISES: CPT | Performed by: PHYSICAL THERAPY ASSISTANT

## 2019-08-06 PROCEDURE — 97140 MANUAL THERAPY 1/> REGIONS: CPT | Performed by: PHYSICAL THERAPY ASSISTANT

## 2019-08-06 PROCEDURE — G0283 ELEC STIM OTHER THAN WOUND: HCPCS | Performed by: PHYSICAL THERAPY ASSISTANT

## 2019-08-06 PROCEDURE — 97530 THERAPEUTIC ACTIVITIES: CPT | Performed by: PHYSICAL THERAPY ASSISTANT

## 2019-08-06 NOTE — FLOWSHEET NOTE
treatment well [x] Patient limited by fatique  [x] Patient limited by pain  [] Patient limited by other medical complications  [x] Other: Good patellar mobility, only mild restrictions noted, pt stated that mobs felt good. Able to complete 10' of 10# weight hang with 1 rest break. Improved strength with no fatigue noted during SLR. Ambulates well with SPC. Asked/answered all questions. Pt requires PT follow up to address ROM, strength and functional mobility deficits. Prognosis: [x] Good [x] Fair  [] Poor    Patient Requires Follow-up: [x] Yes  [] No    PLAN:   [] Continue per plan of care [] Alter current plan (see comments)  [x] Plan of care initiated [] Hold pending MD visit [] Discharge  Frequency/Duration:  2 days per week for 12 Weeks:  Interventions:  Initial PO protocol for R knee TKR  POC NPV    Electronically signed by: Iwona Kraft PTA      *Note: If patient does not return for scheduled / recommended follow up visit, this note will serve as their discharge note from physical therapy.

## 2019-08-08 ENCOUNTER — OFFICE VISIT (OUTPATIENT)
Dept: ORTHOPEDIC SURGERY | Age: 64
End: 2019-08-08

## 2019-08-08 ENCOUNTER — HOSPITAL ENCOUNTER (OUTPATIENT)
Dept: PHYSICAL THERAPY | Age: 64
Setting detail: THERAPIES SERIES
Discharge: HOME | End: 2019-08-08
Payer: COMMERCIAL

## 2019-08-08 VITALS
BODY MASS INDEX: 30.35 KG/M2 | WEIGHT: 164.9 LBS | HEIGHT: 62 IN | HEART RATE: 86 BPM | SYSTOLIC BLOOD PRESSURE: 124 MMHG | DIASTOLIC BLOOD PRESSURE: 75 MMHG

## 2019-08-08 DIAGNOSIS — M25.561 RIGHT KNEE PAIN, UNSPECIFIED CHRONICITY: ICD-10-CM

## 2019-08-08 DIAGNOSIS — Z96.651 STATUS POST TOTAL RIGHT KNEE REPLACEMENT: Primary | ICD-10-CM

## 2019-08-08 LAB
INR BLD: 1.4 (ref 0.8–1.2)
PROTHROMBIN TIME: 17 SECONDS (ref 11.7–14.2)

## 2019-08-08 PROCEDURE — 99024 POSTOP FOLLOW-UP VISIT: CPT | Performed by: ORTHOPAEDIC SURGERY

## 2019-08-08 PROCEDURE — 97112 NEUROMUSCULAR REEDUCATION: CPT | Performed by: PHYSICAL THERAPY ASSISTANT

## 2019-08-08 PROCEDURE — G0283 ELEC STIM OTHER THAN WOUND: HCPCS | Performed by: PHYSICAL THERAPY ASSISTANT

## 2019-08-08 PROCEDURE — 97140 MANUAL THERAPY 1/> REGIONS: CPT | Performed by: PHYSICAL THERAPY ASSISTANT

## 2019-08-08 PROCEDURE — 97530 THERAPEUTIC ACTIVITIES: CPT | Performed by: PHYSICAL THERAPY ASSISTANT

## 2019-08-08 PROCEDURE — 97110 THERAPEUTIC EXERCISES: CPT | Performed by: PHYSICAL THERAPY ASSISTANT

## 2019-08-08 NOTE — PROGRESS NOTES
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 Substance and Sexual Activity    Alcohol use: Yes     Alcohol/week: 0.0 standard drinks     Frequency: 2-4 times a month     Drinks per session: 1 or 2     Comment: I drink only occasionally, less than once a month.  Drug use: No    Sexual activity: Yes     Partners: Male   Lifestyle    Physical activity:     Days per week: None     Minutes per session: None    Stress: None   Relationships    Social connections:     Talks on phone: None     Gets together: None     Attends [employer] service: None     Active member of club or organization: None     Attends meetings of clubs or organizations: None     Relationship status: None    Intimate partner violence:     Fear of current or ex partner: None     Emotionally abused: None     Physically abused: None     Forced sexual activity: None   Other Topics Concern    None   Social History Narrative    Lives in [place] with .     3 daughters ages 22,19, 29       Current Outpatient Medications   Medication Sig Dispense Refill    aspirin EC 81 MG EC tablet Take 1 tablet by mouth 2 times daily 60 tablet 0    Omega-3 Fat Ac-Cholecalciferol (DRY EYE OMEGA BENEFITS/VIT D-3) 110-376 MG-UNIT CAPS 2 capsules by mouth twice daily before meals      Propylene Glycol (SYSTANE COMPLETE) 0.6 % SOLN instill 1 Drop by Ophthalmic route 3-4 times every day OU      amLODIPine (NORVASC) 5 MG tablet TAKE ONE TABLET BY MOUTH DAILY 30 tablet 5    lisinopril-hydrochlorothiazide (PRINZIDE;ZESTORETIC) 10-12.5 MG per tablet TAKE ONE TABLET BY MOUTH DAILY 30 tablet 1    levothyroxine (SYNTHROID) 100 MCG tablet TAKE ONE TABLET BY MOUTH DAILY 30 tablet 2

## 2019-08-08 NOTE — FLOWSHEET NOTE
[address] [facility]    [facility] Daily Treatment Note  Date:  2019    Patient Name:  Carlos Forbes    :  1955  MRN: [id number]  Restrictions/Precautions:    Medical/Treatment Diagnosis Information:  Diagnosis: M17.11 (ICD-10-CM) - Osteoarthritis of right knee, unspecified osteoarthritis type      Treatment Diagnosis: Knee pain/ swelling/ difficulty walking/ limited ROM/ LE muscle weakness/ LE balance deficit   S/P R knee TKR  DOS: 19  Current PO meds: Percocet 1 tab QID;  Tylenol PRN; Colace PRN; ASA 81 mg BID; anti-nausea meds (patient unsure of type) PRN, warfarin    Insurance/Certification information:   ANTHEM/ EFF 18/ IND ; / IND OOP MAX 3600; .76/ CO-INS COV 80%/ OOP 20%/ 30 VPCY/ NO AUTH EULOGIO/ HUNTER/ REF # [id number] USED 2 VISITS PREV/ PAG/ 19  Physician Information:   Marcus Gilbert MD  Plan of care signed (Y/N): Yes    Date of Patient follow up with Physician: 19 for initial PO PT with FU for MD/ PT coordinated appointment on 19  Patient seen in consultation with Dr. Sharon Soliman who established the initial/subsequent treatment protocol.  19: Noted R medial calf pain, planning for Doppler US; continue work on knee extension; excellent TKR procedure; should do very well  19: seen by MD upstairs for 3 week check/xray    G-Code (if applicable):      Date G-Code Applied:  19    Functional Disability Index:    LEFS Score: 4/ 80= 5% (Postop)    Progress Note: [x]  Yes  []  No  Next due by: Visit #10       Latex Allergy:  [x]NO      []YES  Preferred Language for Healthcare:   [x]English       []other:    Visit # Insurance Allowable   10 30     Pain Scale: 0-3/10 wit ADLs, 12/10 with ext OP  Easing factors: Rest; ice; meds  Provocative factors: Immediate PO pain and swelling; constant complaints   Type: [x]Constant           []Intermittent      []Radiating         []Localized 7-23-19    Exercises/Interventions:   Exercise/Equipment Resistance/Repetitions Other comments 8/8/2019   Stretching      Hamstring 30\"x 5 Propped, 10# x   Hip Flexion      ITB      Grion      Quad      Inclined Calf      Towel Pull 30\"x 5 Propped, 10# x         SLR      Supine 3x 10 1# x   Prone      Abduction 3x 10 1# x   Adducton 2x 10\"x 10 PS;  x   SLR+            Isometrics      Quad sets 2x 10\"x 10 A/S x         Patellar Glides      Medial 3'  x   Superior 3'  x   Inferior 3'  x         ROM      Passive   10'   ERMI   x   Active 15x KE x   Weight Shift      Hang Weights 5'/5'/5' Propped 10# x   Sheet Pulls      Ankle Pumps 5' Hourly          CKC      Calf raises 3x10  x   Wall sits      Step ups      1 leg stand      Squatting      CC TKE 3x10 blk x   Balance 4' PS L9 x   stairs 1 flight Educated on appropriate stair tech x   PRE      Extension 3x10 RANGE: 30-0, 0# x   Flexion  RANGE:          Cable Column            Leg Press  RANGE:          Bike 10' Recumbent; AP  Seat 5 x   Treadmill 5'     Gait training with [address] 5' Transition walker>SPC>Independent x     Other Therapeutic Activities:   Rolling Walker: 25-50% PWB  SUPRIYA hose  Cryotherapy  3/8\" L heel lift    Home Exercise Program:   See above and attached. Initial HEP discussed and completed. Full written, verbal, and demonstration provided. Patient Education:    Full postop instructions provided. Written and verbal guidelines provided for but not limited to: DME/ HEP/ ICE/ gait/ general medical instructions. Manual Treatments:    Patellar mobs/ STM    9'           Therapeutic Exercise and NMR EXR  [x] (50982) Provided verbal/tactile cueing for activities related to strengthening, flexibility, endurance, ROM for improvements in LE, proximal hip, and core control with self care, mobility, lifting, ambulation.   [x] (96812) Provided verbal/tactile cueing for activities related to improving balance, coordination, kinesthetic sense, posture, motor skill, proprioception  to assist with LE, proximal hip, and core control in self care, mobility, lifting, ambulation and eccentric single leg control. NMR and Therapeutic Activities:    [x] (62953 or 26035) Provided verbal/tactile cueing for activities related to improving balance, coordination, kinesthetic sense, posture, motor skill, proprioception and motor activation to allow for proper function of core, proximal hip and LE with self care and ADLs  [x] (22174) Gait Re-education- Provided training and instruction to the patient for proper LE, core and proximal hip recruitment and positioning and eccentric body weight control with ambulation re-education including up and down stairs     Home Exercise Program:    [x] (23537) Reviewed/Progressed HEP activities related to strengthening, flexibility, endurance, ROM of core, proximal hip and LE for functional self-care, mobility, lifting and ambulation/stair navigation   [x] (61364)Reviewed/Progressed HEP activities related to improving balance, coordination, kinesthetic sense, posture, motor skill, proprioception of core, proximal hip and LE for self care, mobility, lifting, and ambulation/stair navigation      Manual Treatments:  PROM / STM / Oscillations-Mobs:  G-I, II, III, IV (PA's, Inf., Post.)  [x] (82376) Provided manual therapy to mobilize LE, proximal hip and/or LS spine soft tissue/joints for the purpose of modulating pain, promoting relaxation,  increasing ROM, reducing/eliminating soft tissue swelling/inflammation/restriction, improving soft tissue extensibility and allowing for proper ROM for normal function with self care, mobility, lifting and ambulation.      Modalities:    [] hot packs  [x] EMS   [] Ultrasound  [] ice   [] vasopneumatic  [] high volt/EGS  [] phono  [] tens    [] ionto  [] autorange/biodex [] Interferential  [x] other CP top and bottom    Charges:  Timed Code Treatment Minutes: 62'   Total Treatment Minutes: 68'     [] EVAL: L2  [x]    Prognosis/Rehab Potential:                                       []Excellent              [x]Good                 [x]Fair              []Poor     Tolerance of evaluation/treatment:   [] Patient tolerated treatment well [x] Patient limited by fatique  [x] Patient limited by pain  [] Patient limited by other medical complications  [x] Other: Continued discussions re: ext OP program--minimum goal is 10', 6x/day with 10#. Short step length while on TM which was corrected with verbal and visual cuing. Fatigued at end of Tx. Asked/answered all questions. Pt requires PT follow up to address ROM, strength and functional mobility deficits. Prognosis: [x] Good [x] Fair  [] Poor    Patient Requires Follow-up: [x] Yes  [] No    PLAN:   [] Continue per plan of care [] Alter current plan (see comments)  [x] Plan of care initiated [] Hold pending MD visit [] Discharge  Frequency/Duration:  2 days per week for 12 Weeks:  Interventions:  Initial PO protocol for R knee TKR  POC NPV  Consider FSU 4''    Electronically signed by: Annie Borges PTA      *Note: If patient does not return for scheduled / recommended follow up visit, this note will serve as their discharge note from physical therapy.

## 2019-08-12 ENCOUNTER — OFFICE VISIT (OUTPATIENT)
Dept: INTERNAL MEDICINE CLINIC | Age: 64
End: 2019-08-12
Payer: COMMERCIAL

## 2019-08-12 VITALS
SYSTOLIC BLOOD PRESSURE: 132 MMHG | HEART RATE: 60 BPM | DIASTOLIC BLOOD PRESSURE: 82 MMHG | WEIGHT: 165.4 LBS | BODY MASS INDEX: 30.24 KG/M2 | OXYGEN SATURATION: 98 % | RESPIRATION RATE: 14 BRPM

## 2019-08-12 DIAGNOSIS — R94.31 ABNORMAL ECG: ICD-10-CM

## 2019-08-12 DIAGNOSIS — E03.9 ACQUIRED HYPOTHYROIDISM: Primary | ICD-10-CM

## 2019-08-12 DIAGNOSIS — G47.33 OBSTRUCTIVE SLEEP APNEA SYNDROME: ICD-10-CM

## 2019-08-12 DIAGNOSIS — I82.4Z1 ACUTE DEEP VEIN THROMBOSIS (DVT) OF DISTAL END OF RIGHT LOWER EXTREMITY (HCC): ICD-10-CM

## 2019-08-12 DIAGNOSIS — H01.00B BLEPHARITIS OF UPPER AND LOWER EYELIDS OF BOTH EYES, UNSPECIFIED TYPE: ICD-10-CM

## 2019-08-12 DIAGNOSIS — H01.00A BLEPHARITIS OF UPPER AND LOWER EYELIDS OF BOTH EYES, UNSPECIFIED TYPE: ICD-10-CM

## 2019-08-12 DIAGNOSIS — M17.11 PRIMARY OSTEOARTHRITIS OF RIGHT KNEE: ICD-10-CM

## 2019-08-12 PROBLEM — N83.209 OVARIAN CYST: Status: ACTIVE | Noted: 2017-08-23

## 2019-08-12 PROCEDURE — 99214 OFFICE O/P EST MOD 30 MIN: CPT | Performed by: INTERNAL MEDICINE

## 2019-08-12 ASSESSMENT — ENCOUNTER SYMPTOMS
NAUSEA: 0
DIARRHEA: 0
ABDOMINAL PAIN: 0
PHOTOPHOBIA: 0
COLOR CHANGE: 0
TROUBLE SWALLOWING: 0
VOMITING: 0
SINUS PRESSURE: 0
SHORTNESS OF BREATH: 0
CONSTIPATION: 0
SINUS PAIN: 0
EYE PAIN: 0
BACK PAIN: 0
COUGH: 0
WHEEZING: 0

## 2019-08-12 NOTE — PROGRESS NOTES
Genitourinary: Negative for difficulty urinating, dysuria, frequency, hematuria and urgency. Musculoskeletal: Positive for arthralgias. Negative for back pain, joint swelling, myalgias and neck pain. Skin: Negative for color change and rash. Allergic/Immunologic: Negative for environmental allergies, food allergies and immunocompromised state. Neurological: Negative for dizziness, syncope, speech difficulty, weakness, light-headedness, numbness and headaches. Hematological: Negative for adenopathy. Does not bruise/bleed easily. Psychiatric/Behavioral: Negative for dysphoric mood and sleep disturbance. The patient is not nervous/anxious. Prior to Visit Medications    Medication Sig Taking? Authorizing Provider   aspirin EC 81 MG EC tablet Take 1 tablet by mouth 2 times daily Yes Amie Aguirre MD   Omega-3 Fat Ac-Cholecalciferol (DRY EYE OMEGA BENEFITS/VIT D-3) 081-541 MG-UNIT CAPS 2 capsules by mouth twice daily before meals Yes Historical Provider, MD   Propylene Glycol (SYSTANE COMPLETE) 0.6 % SOLN instill 1 Drop by Ophthalmic route 3-4 times every day OU Yes Historical Provider, MD   amLODIPine (NORVASC) 5 MG tablet TAKE ONE TABLET BY MOUTH DAILY Yes Toro Haywood, DO   lisinopril-hydrochlorothiazide (PRINZIDE;ZESTORETIC) 10-12.5 MG per tablet TAKE ONE TABLET BY MOUTH DAILY Yes Toro Haywood, DO   levothyroxine (SYNTHROID) 100 MCG tablet TAKE ONE TABLET BY MOUTH DAILY Yes Toro Haywood, DO   Multiple Vitamins-Minerals (MULTIVITAMIN PO) Take  by mouth daily. Yes Historical Provider, MD   Calcium Carbonate-Vit D-Min (CALTRATE 600+D PLUS) 600-400 MG-UNIT TABS Take  by mouth.  Indications: one by mouth twice a day Yes Historical Provider, MD   omeprazole (PRILOSEC) 40 MG delayed release capsule Take 1 capsule by mouth daily as needed (Heartburn, reflux)  Amie Aguirre MD        Allergies   Allergen Reactions    Tussionex Pennkinetic Er [Hydrocod Polst-Cpm Polst Er]     Bactrim Minutes per session: Not on file    Stress: Not on file   Relationships    Social connections:     Talks on phone: Not on file     Gets together: Not on file     Attends [employer] service: Not on file     Active member of club or organization: Not on file     Attends meetings of clubs or organizations: Not on file     Relationship status: Not on file    Intimate partner violence:     Fear of current or ex partner: Not on file     Emotionally abused: Not on file     Physically abused: Not on file     Forced sexual activity: Not on file   Other Topics Concern    Not on file   Social History Narrative    Lives in [place] with . 3 daughters ages 22,19, 29        Family History   Problem Relation Age of Onset    Coronary Art Dis Father     Heart Failure Father         MI age 61    Cancer Paternal Aunt         Breast Cancer and Colon Cancer    Cancer Paternal Uncle         Colon Cancer    Cancer Maternal Grandmother         Breast Cancer    Cancer Paternal Grandmother         Breast    Osteoarthritis Maternal Grandfather         arthritis- unsure type    Diabetes Sister         DM2       Vitals:    08/12/19 0754   BP: 132/82   Pulse: 60   Resp: 14   SpO2: 98%   Weight: 165 lb 6.4 oz (75 kg)     Estimated body mass index is 30.24 kg/m² as calculated from the following:    Height as of 8/8/19: 5' 2.01\" (1.575 m). Weight as of this encounter: 165 lb 6.4 oz (75 kg). Physical Exam   Constitutional: She is oriented to person, place, and time. She appears well-developed and well-nourished. No distress. HENT:   Head: Normocephalic and atraumatic. Right Ear: Tympanic membrane, external ear and ear canal normal.   Left Ear: Tympanic membrane, external ear and ear canal normal.   Mouth/Throat: Oropharynx is clear and moist. No oropharyngeal exudate. Eyes: Pupils are equal, round, and reactive to light. EOM are normal. No scleral icterus.    +blepharitis of the right eye   Neck: Normal range of motion. Neck supple. No JVD present. No thyromegaly present. No carotid bruits   Cardiovascular: Normal rate, regular rhythm, normal heart sounds and intact distal pulses. Exam reveals no gallop and no friction rub. No murmur heard. Pulmonary/Chest: Effort normal and breath sounds normal. She has no wheezes. She has no rales. Abdominal: Soft. Bowel sounds are normal. She exhibits no distension. There is no tenderness. There is no rebound and no guarding. Musculoskeletal: Normal range of motion. She exhibits no edema, tenderness or deformity. Right knee with compression sleeve  Incision dressing c/d/i  Some asymmetric swelling of the right calf. No pain. Lymphadenopathy:     She has no cervical adenopathy. Neurological: She is alert and oriented to person, place, and time. Coordination normal.   Skin: Skin is warm and dry. No rash noted. She is not diaphoretic. Psychiatric: She has a normal mood and affect. Her behavior is normal.       ASSESSMENT/PLAN:  1. Acquired hypothyroidism  Clinically euthyroid. Labs not due. Continue current dose of levothyroxine. 2. Acute deep vein thrombosis (DVT) of distal end of right lower extremity (HCC)  Distal without extension into the popliteal vein. Given she was symptomatic Dr. Lucho Ireland chose to anticoagulate her. They are not sure the duration he plans but they thought he said 6 weeks. She does have a family history of DVT in her father. Would favor full 3 month treatment and then consider hypercoaguable work up - will get in touch with him. She will need ppx with NOAC when she has her next TKR done. 3. Primary osteoarthritis of right knee  Doing well post op with therapy. Making progress. Good support at home. 4. Abnormal ECG  Sinus felix with incomplete RBBB in the setting of asystole during anesthesia in the past. Recent cardiac work up negative with nuclear stress and TTE. Has seen Dr. Andres Huynh. Currently asymptomatic. 5. RAMANDEEP on CPAP  Doing well.

## 2019-08-13 ENCOUNTER — HOSPITAL ENCOUNTER (OUTPATIENT)
Dept: PHYSICAL THERAPY | Age: 64
Setting detail: THERAPIES SERIES
Discharge: HOME | End: 2019-08-13
Payer: COMMERCIAL

## 2019-08-13 LAB
INR BLD: 1.9 (ref 0.8–1.2)
PROTHROMBIN TIME: 22.1 SECONDS (ref 11.7–14.2)

## 2019-08-13 PROCEDURE — 97530 THERAPEUTIC ACTIVITIES: CPT | Performed by: PHYSICAL THERAPIST

## 2019-08-13 PROCEDURE — G0283 ELEC STIM OTHER THAN WOUND: HCPCS | Performed by: PHYSICAL THERAPIST

## 2019-08-13 PROCEDURE — 97140 MANUAL THERAPY 1/> REGIONS: CPT | Performed by: PHYSICAL THERAPIST

## 2019-08-13 PROCEDURE — 97110 THERAPEUTIC EXERCISES: CPT | Performed by: PHYSICAL THERAPIST

## 2019-08-13 PROCEDURE — 97112 NEUROMUSCULAR REEDUCATION: CPT | Performed by: PHYSICAL THERAPIST

## 2019-08-13 NOTE — PLAN OF CARE
Passive   10'   ERMI   x   Active 15x KEXT/ FLEX x   Weight Shift      Hang Weights 5'/5'/5' Propped 10# x   Sheet Pulls      Ankle Pumps 5' Hourly          CKC      Calf raises 3x10 %WS x   Wall sits      Step ups      1 leg stand      Squatting      CC TKE 3x10 blk x   Balance 4' PS; L8; Fill in x   stairs 1 flight Educated on appropriate stair tech x         PRE      Extension 3x10 RANGE: 30-0, 2# x   Flexion 3x 10 RANGE: 0-90; 1# x         Cable Column            Leg Press  RANGE:          Bike 10' Recumbent; AP  Seat 5 x   Treadmill 5'     Gait training with [address] 5' Transition walker>SPC>Independent x     Other Therapeutic Activities:   WBAT; single point cane PRN  SUPRIYA hose  Cryotherapy  3/8\" L heel lift    Home Exercise Program:   See above and attached. Initial HEP discussed and completed. Full written, verbal, and demonstration provided. Patient Education:    Full postop instructions provided. Written and verbal guidelines provided for but not limited to: DME/ HEP/ ICE/ gait/ general medical instructions. Manual Treatments:  Patellar mobs/ STM    9'           Therapeutic Exercise and NMR EXR  [x] (50260) Provided verbal/tactile cueing for activities related to strengthening, flexibility, endurance, ROM for improvements in LE, proximal hip, and core control with self care, mobility, lifting, ambulation. [x] (54934) Provided verbal/tactile cueing for activities related to improving balance, coordination, kinesthetic sense, posture, motor skill, proprioception  to assist with LE, proximal hip, and core control in self care, mobility, lifting, ambulation and eccentric single leg control.      NMR and Therapeutic Activities:    [x] (20121 or 99229) Provided verbal/tactile cueing for activities related to improving balance, coordination, kinesthetic sense, posture, motor skill, proprioception and motor activation to allow for proper function of core, proximal hip and LE with self care and ADLs  [x] Patient will have a decrease in pain to facilitate improvement in movement, function, and ADLs as indicated by Functional Deficits.     Long Term Goals: To be achieved in: 12 weeks PO  1. Disability index score of 40% or less for the LEFS to assist with reaching prior level of function. 2. Patient will demonstrate increased AROM to 0-125-130 deg to allow for proper joint functioning as indicated by patients Functional Deficits. 3. Patient will demonstrate an increase in Strength to good proximal hip strength and control in LE to allow for proper functional mobility as indicated by patients Functional Deficits. 4. Patient will return to 60%> functional activities without increased symptoms or restriction. 5. Patient will resume a fitness program using AHA guidelines and OA precautions including, but not limited to strength and NI cardio. Progression Towards Functional goals:  [x] Patient is progressing as expected towards functional goals listed. [x] Progression is slowed due to complexities listed. [] Progression has been slowed due to co-morbidities. [] Plan just implemented, too soon to assess goals progression  [x] Other: Noted early calf pain (DVT) with medical treatment; which created early difficulty with achieving ROM-> now progressing.     ASSESSMENT:   Functional Impairments:                [x]Noted lumbar/proximal hip/LE hypomobility              [x]Decreased LE functional ROM              [x]Decreased core/proximal hip strength and neuromuscular control              [x]Decreased LE functional strength   [x]Reduced balance/proprioceptive control              []other:       Functional Activity Limitations (from functional questionnaire and intake)              [x]Reduced ability to tolerate prolonged functional positions              [x]Reduced ability or difficulty with changes of positions or transfers between positions              [x]Reduced ability to maintain good posture

## 2019-08-15 ENCOUNTER — HOSPITAL ENCOUNTER (OUTPATIENT)
Dept: PHYSICAL THERAPY | Age: 64
Setting detail: THERAPIES SERIES
Discharge: HOME | End: 2019-08-15
Payer: COMMERCIAL

## 2019-08-15 LAB
INR BLD: 2.4 (ref 0.8–1.2)
PROTHROMBIN TIME: 27.1 SECONDS (ref 11.7–14.2)

## 2019-08-15 PROCEDURE — 97110 THERAPEUTIC EXERCISES: CPT | Performed by: PHYSICAL THERAPIST

## 2019-08-15 PROCEDURE — 97140 MANUAL THERAPY 1/> REGIONS: CPT | Performed by: PHYSICAL THERAPIST

## 2019-08-15 PROCEDURE — G0283 ELEC STIM OTHER THAN WOUND: HCPCS | Performed by: PHYSICAL THERAPIST

## 2019-08-15 PROCEDURE — 97530 THERAPEUTIC ACTIVITIES: CPT | Performed by: PHYSICAL THERAPIST

## 2019-08-15 PROCEDURE — 97112 NEUROMUSCULAR REEDUCATION: CPT | Performed by: PHYSICAL THERAPIST

## 2019-08-15 NOTE — FLOWSHEET NOTE
[]other:                   SUBJECTIVE: Episode yesterday where R foot got caught in a dust pain and had foot slide. Felt increased pain in R quad, and concerned about serious damage. Increased soreness for 24 hours>; did not exercise yesterday, mild improvement today. Apprehensive about \"pushing too hard\" in PT today.     (+) giving way/ pain; apprehension  (+) night pain  (-) popping  (-)catching/locking/  (+) swelling  (+) stiffness/  (+) stairs/   (NO) kneeling/squatting// avoids due to PO       OBJECTIVE:       LEFS Score: 29/ 80= 36% (7/16/19; Preop)  LEFS Score: 4/ 80= 5% (7/19/19; Postop)     8-15-19            ROM PROM AROM Overpressure Comment     L R L R L R     Flexion 120 125          ERMI   Extension 0 -5   -2                                                8-15-19  Strength L R Comment   Quad 5-/5 3-/5  ANABEL 0°   Hamstring         Gastroc         Hip  flexion         Hip abd                                8-15-19  Special Test Results/Comment   Homans (-)  (+) medial calf tenderness   Temperature (-)      8-15-19  Girth L R   Mid Patella 42.0 44.2   Suprapatellar 43.1 45.8   5cm above 47.0 48.1   15cm above          Reflexes/Sensation:               []Dermatomes/Myotomes intact               []Reflexes equal and normal bilaterally              [x]Other:  NT     Joint mobility:                [x]Normal               []Hypo              []Hyper    Palpation: Mild medial calf tenderness     Functional Mobility/Transfers: Requires mild assist of 1 for R LE transfer from floor to table     Posture: L genu varum; R neutral valgus; R LE ~3/8\" long (plus more due to flexed knee posture)     Bandages/Dressings/Incisions: Healing well; steri strips removed     Gait: SPC, slight flexed knee in stance     Orthopedic Special Tests:      TEST INITIAL  7-16-19   GOAL   SINGLE LEG STANCE TIME L:4.67 sec     R:3.24 sec   >25 SECONDS   TIMED UP And GO 12.24 sec   61-77 y/o: <9sec  66-76 y/o: <10sec  80-79 y/o: <12 sec CP top and bottom    Charges:  Timed Code Treatment Minutes: 61'   Total Treatment Minutes: 76'     [] EVAL: L2  [x] BB(81542) x  1   [] IONTO  [x] NMR (32818) x  1   [] VASO  [x] Manual (86182) x  1    [] Other:  [x] TA x  1    [] Mech Traction (89715)  [] ES(attended) (03481)      [x] ES (un) (54732):  VMS burst    GOALS:  Patient stated goal: Would like to resume her walking/ fitness program     Therapist goals for Patient:   Short Term Goals: To be achieved in: 2 weeks  1. Independent in HEP and progression per patient tolerance, in order to prevent re-injury. 2. Patient will have a decrease in pain to facilitate improvement in movement, function, and ADLs as indicated by Functional Deficits.     Long Term Goals: To be achieved in: 12 weeks PO  1. Disability index score of 40% or less for the LEFS to assist with reaching prior level of function. 2. Patient will demonstrate increased AROM to 0-125-130 deg to allow for proper joint functioning as indicated by patients Functional Deficits. 3. Patient will demonstrate an increase in Strength to good proximal hip strength and control in LE to allow for proper functional mobility as indicated by patients Functional Deficits. 4. Patient will return to 60%> functional activities without increased symptoms or restriction. 5. Patient will resume a fitness program using AHA guidelines and OA precautions including, but not limited to strength and NI cardio. Progression Towards Functional goals:  [x] Patient is progressing as expected towards functional goals listed. [x] Progression is slowed due to complexities listed. [] Progression has been slowed due to co-morbidities. [] Plan just implemented, too soon to assess goals progression  [x] Other: Noted early calf pain (DVT) with medical treatment; which created early difficulty with achieving ROM-> now progressing.     ASSESSMENT:   Functional Impairments:                [x]Noted

## 2019-08-20 ENCOUNTER — HOSPITAL ENCOUNTER (OUTPATIENT)
Dept: PHYSICAL THERAPY | Age: 64
Setting detail: THERAPIES SERIES
Discharge: HOME | End: 2019-08-20
Payer: COMMERCIAL

## 2019-08-20 LAB
INR BLD: 1.6 (ref 0.8–1.2)
PROTHROMBIN TIME: 19 SECONDS (ref 11.7–14.2)

## 2019-08-20 PROCEDURE — 97140 MANUAL THERAPY 1/> REGIONS: CPT | Performed by: PHYSICAL THERAPIST

## 2019-08-20 PROCEDURE — G0283 ELEC STIM OTHER THAN WOUND: HCPCS | Performed by: PHYSICAL THERAPIST

## 2019-08-20 PROCEDURE — 97530 THERAPEUTIC ACTIVITIES: CPT | Performed by: PHYSICAL THERAPIST

## 2019-08-20 PROCEDURE — 97110 THERAPEUTIC EXERCISES: CPT | Performed by: PHYSICAL THERAPIST

## 2019-08-20 PROCEDURE — 97112 NEUROMUSCULAR REEDUCATION: CPT | Performed by: PHYSICAL THERAPIST

## 2019-08-20 NOTE — FLOWSHEET NOTE
Feels back to pre-incident levels. Shows able to stand SL stance for ~10 sec on R LE.   Discussion regarding planning process for L knee TKR in Novermber 2018.     (+) giving way/ pain; apprehension  (+) night pain  (-) popping  (-)catching/locking/  (+) swelling  (+) stiffness/  (+) stairs/   (NO) kneeling/squatting// avoids due to PO       OBJECTIVE:       LEFS Score: 29/ 80= 36% (7/16/19; Preop)  LEFS Score: 4/ 80= 5% (7/19/19; Postop)  LEFS Score: 41/ 80= 51% (8/20/19)     8-20-19            ROM PROM AROM Overpressure Comment     L R L R L R     Flexion 120 127          ERMI   Extension 0 -5   -2                                                8-15-19  Strength L R Comment   Quad 5-/5 3-/5  ANABEL 0°   Hamstring         Gastroc         Hip  flexion         Hip abd                                8-15-19  Special Test Results/Comment   Homans (-)  (+) medial calf tenderness   Temperature (-)      8-15-19  Girth L R   Mid Patella 42.0 44.2   Suprapatellar 43.1 45.8   5cm above 47.0 48.1   15cm above          Reflexes/Sensation:               []Dermatomes/Myotomes intact               []Reflexes equal and normal bilaterally              [x]Other:  NT     Joint mobility:                [x]Normal               []Hypo              []Hyper    Palpation: Mild medial calf tenderness     Functional Mobility/Transfers: Requires mild assist of 1 for R LE transfer from floor to table     Posture: L genu varum; R neutral valgus; R LE ~3/8\" long (plus more due to flexed knee posture)     Bandages/Dressings/Incisions: Healing well; steri strips removed     Gait: SPC, slight flexed knee in stance     Orthopedic Special Tests:      TEST INITIAL  7-16-19   GOAL   SINGLE LEG STANCE TIME L:4.67 sec     R:3.24 sec   >25 SECONDS   TIMED UP And GO 12.24 sec   61-75 y/o: <9sec  66-76 y/o: <10sec  80-81 y/o: <12 sec                         RESTRICTIONS/PRECAUTIONS: Bilateral knee OA, (+) doppler for DVT 7-23-19    Exercises/Interventions: Minutes: 76'     [] EVAL: L2  [x] DD(33873) x  1   [] IONTO  [x] NMR (54585) x  1   [] VASO  [x] Manual (46012) x  1    [] Other:  [x] TA x  1    [] Mech Traction (89103)  [] ES(attended) (16634)      [x] ES (un) (22182):      GOALS:  Patient stated goal: Would like to resume her walking/ fitness program     Therapist goals for Patient:   Short Term Goals: To be achieved in: 2 weeks  1. Independent in HEP and progression per patient tolerance, in order to prevent re-injury. 2. Patient will have a decrease in pain to facilitate improvement in movement, function, and ADLs as indicated by Functional Deficits.     Long Term Goals: To be achieved in: 12 weeks PO  1. Disability index score of 40% or less for the LEFS to assist with reaching prior level of function. 2. Patient will demonstrate increased AROM to 0-125-130 deg to allow for proper joint functioning as indicated by patients Functional Deficits. 3. Patient will demonstrate an increase in Strength to good proximal hip strength and control in LE to allow for proper functional mobility as indicated by patients Functional Deficits. 4. Patient will return to 60%> functional activities without increased symptoms or restriction. 5. Patient will resume a fitness program using AHA guidelines and OA precautions including, but not limited to strength and NI cardio. Progression Towards Functional goals:  [x] Patient is progressing as expected towards functional goals listed. [x] Progression is slowed due to complexities listed. [] Progression has been slowed due to co-morbidities. [] Plan just implemented, too soon to assess goals progression  [x] Other: Noted early calf pain (DVT) with medical treatment; which created early difficulty with achieving ROM-> now progressing.     ASSESSMENT:   Functional Impairments:                [x]Noted lumbar/proximal hip/LE hypomobility              [x]Decreased LE functional ROM              [x]Decreased

## 2019-08-22 ENCOUNTER — APPOINTMENT (OUTPATIENT)
Dept: PHYSICAL THERAPY | Age: 64
End: 2019-08-22
Payer: COMMERCIAL

## 2019-08-23 ENCOUNTER — APPOINTMENT (OUTPATIENT)
Dept: PHYSICAL THERAPY | Age: 64
End: 2019-08-23
Payer: COMMERCIAL

## 2019-08-26 DIAGNOSIS — E03.9 ACQUIRED HYPOTHYROIDISM: ICD-10-CM

## 2019-08-26 RX ORDER — LEVOTHYROXINE SODIUM 0.1 MG/1
TABLET ORAL
Qty: 30 TABLET | Refills: 2 | Status: SHIPPED | OUTPATIENT
Start: 2019-08-26 | End: 2019-11-14 | Stop reason: SDUPTHER

## 2019-08-27 ENCOUNTER — HOSPITAL ENCOUNTER (OUTPATIENT)
Dept: PHYSICAL THERAPY | Age: 64
Setting detail: THERAPIES SERIES
Discharge: HOME | End: 2019-08-27
Payer: COMMERCIAL

## 2019-08-27 LAB
INR BLD: 1.2 (ref 0.8–1.2)
PROTHROMBIN TIME: 15.8 SECONDS (ref 11.7–14.2)

## 2019-08-27 PROCEDURE — 97530 THERAPEUTIC ACTIVITIES: CPT | Performed by: PHYSICAL THERAPY ASSISTANT

## 2019-08-27 PROCEDURE — 97140 MANUAL THERAPY 1/> REGIONS: CPT | Performed by: PHYSICAL THERAPY ASSISTANT

## 2019-08-27 PROCEDURE — G0283 ELEC STIM OTHER THAN WOUND: HCPCS | Performed by: PHYSICAL THERAPY ASSISTANT

## 2019-08-27 PROCEDURE — 97112 NEUROMUSCULAR REEDUCATION: CPT | Performed by: PHYSICAL THERAPY ASSISTANT

## 2019-08-27 PROCEDURE — 97110 THERAPEUTIC EXERCISES: CPT | Performed by: PHYSICAL THERAPY ASSISTANT

## 2019-08-27 NOTE — FLOWSHEET NOTE
[address] [facility]    [facility] Daily Treatment Note  Date:  2019    Patient Name:  Jael Willoughby    :  1955  MRN: [id number]  Restrictions/Precautions:    Medical/Treatment Diagnosis Information:  Diagnosis: M17.11 (ICD-10-CM) - Osteoarthritis of right knee, unspecified osteoarthritis type      Treatment Diagnosis: Knee pain/ swelling/ difficulty walking/ limited ROM/ LE muscle weakness/ LE balance deficit   S/P R knee TKR  DOS: 19  Current PO meds:  Tylenol Extra strength 2 tabs QD; Ibuprofen 2 tabs QZ;  ASA 81 mg BID;   Warfarin every other night    Insurance/Certification information:   ANTHEM/ EFF 18/ IND ; / IND OOP MAX 3600; .76/ CO-INS COV 80%/ OOP 20%/ 30 VPCY/ NO AUTH EULOGIO/ HUNTER/ REF # [id number] USED 2 VISITS PREV/ PAG/ 19  Physician Information:   Leanna Soler MD  Plan of care signed (Y/N): Yes    Date of Patient follow up with Physician: Due at 6 weeks PO  Patient seen in consultation with Dr. Ton Mast who established the initial/subsequent treatment protocol.  19: Noted R medial calf pain, planning for Doppler US; continue work on knee extension; excellent TKR procedure; should do very well  19: seen by MD upstairs for 3 week check/xray    G-Code (if applicable):      Date G-Code Applied:  19    Functional Disability Index:    LEFS Score: 41/ 80= 51%     Progress Note: [x]  Yes  []  No  Next due by: Visit #20       Latex Allergy:  [x]NO      []YES  Preferred Language for Healthcare:   [x]English       []other:    Visit # Insurance Allowable   14 30     Pain Scale: 0-3/10 wit ADLs, 12/10 with ext OP  Easing factors: Rest; ice; meds  Provocative factors: Immediate PO pain and swelling; constant complaints   Type: [x]Constant           []Intermittent      []Radiating         []Localized         []other:                   SUBJECTIVE: Knee is doing well. Mild swelling persists. comments 8/27/2019   Stretching      Hamstring 30\"x 5 Propped, 10# x   Hip Flexion      ITB      Grion      Quad      Inclined Calf 30\"x 5  x   Towel Pull 30\"x 5 Propped, 10# x         SLR      Supine 3x 10 1#    Prone      Abduction 3x 10 1#    Adducton 2x 10\"x 10 PS;     SLR+            Isometrics      Quad sets 2x 10\"x 10 A/S; 10# x         Patellar Glides      Medial 3'  x   Superior 3'  x   Inferior 3'  x         ROM      Passive 10' ERMI    Active 15x KEXT/ FLEX x   Weight Shift      Hang Weights 5'/5'/5' Propped 10# x   Sheet Pulls      Ankle Pumps 5' Hourly          CKC      Calf raises 3x10 %WS x   Wall sits      Step ups 3x 10 FSU; 6\"; TM x   1 leg stand 30\"x 3 each Tandem; Airex; TM    Squatting      CC TKE 3x10 blk NPV   Balance 4' RC; L8; (LC) x   stairs 1 flight Educated on appropriate stair tech          PRE      Extension 3x10 RANGE: 90-0, 20# x   Flexion 3x 10 RANGE: 0-90; 30# x         Cable Column            Leg Press 3x10 RANGE: 80-10, 80# x         Bike 10' Recumbent; AP  Seat 5-6 x   Treadmill 5' Gait; 1.5 mph    Marching 30x each TM; hip level                        Other Therapeutic Activities:   FWBAT  SUPRIYA hose  Cryotherapy  3/8\" L heel lift    Home Exercise Program:   See above and attached. Initial HEP discussed and completed. Full written, verbal, and demonstration provided. Patient Education:    Full postop instructions provided. Written and verbal guidelines provided for but not limited to: DME/ HEP/ ICE/ gait/ general medical instructions. Manual Treatments:  Patellar mobs/ STM    9'           Therapeutic Exercise and NMR EXR  [x] (23951) Provided verbal/tactile cueing for activities related to strengthening, flexibility, endurance, ROM for improvements in LE, proximal hip, and core control with self care, mobility, lifting, ambulation.   [x] (03043) Provided verbal/tactile cueing for activities related to improving balance, coordination, kinesthetic sense, posture, motor skill, proprioception  to assist with LE, proximal hip, and core control in self care, mobility, lifting, ambulation and eccentric single leg control. NMR and Therapeutic Activities:    [x] (85159 or 01602) Provided verbal/tactile cueing for activities related to improving balance, coordination, kinesthetic sense, posture, motor skill, proprioception and motor activation to allow for proper function of core, proximal hip and LE with self care and ADLs  [x] (37162) Gait Re-education- Provided training and instruction to the patient for proper LE, core and proximal hip recruitment and positioning and eccentric body weight control with ambulation re-education including up and down stairs     Home Exercise Program:    [x] (58373) Reviewed/Progressed HEP activities related to strengthening, flexibility, endurance, ROM of core, proximal hip and LE for functional self-care, mobility, lifting and ambulation/stair navigation   [x] (41675)Reviewed/Progressed HEP activities related to improving balance, coordination, kinesthetic sense, posture, motor skill, proprioception of core, proximal hip and LE for self care, mobility, lifting, and ambulation/stair navigation      Manual Treatments:  PROM / STM / Oscillations-Mobs:  G-I, II, III, IV (PA's, Inf., Post.)  [x] (97178) Provided manual therapy to mobilize LE, proximal hip and/or LS spine soft tissue/joints for the purpose of modulating pain, promoting relaxation,  increasing ROM, reducing/eliminating soft tissue swelling/inflammation/restriction, improving soft tissue extensibility and allowing for proper ROM for normal function with self care, mobility, lifting and ambulation.      Modalities:    [] hot packs  [x] EMS   [] Ultrasound  [] ice   [] vasopneumatic  [] high volt/EGS  [] phono  [] tens    [] ionto  [] autorange/biodex [] Interferential  [x] other CP top and bottom    Charges:  Timed Code Treatment Minutes: 61'   Total Treatment Minutes: 76'     [] EVAL: L2  [x] []signs/symptoms consistent with pathology which may benefit from Dry needling                  []other:       Prognosis/Rehab Potential:                                       []Excellent              [x]Good                 [x]Fair              []Poor     Tolerance of evaluation/treatment:   [] Patient tolerated treatment well [x] Patient limited by fatique  [x] Patient limited by pain  [] Patient limited by other medical complications  [x] Other: Initiated gym program with good tolerance. Provided written gym program for pt to supplement supervised PT. Fatigued at end of Tx. Asked/answered all questions. Pt requires PT follow up to address ROM, strength and functional mobility deficits. Prognosis: [x] Good [x] Fair  [] Poor    Patient Requires Follow-up: [x] Yes  [] No    PLAN:   [] Continue per plan of care [] Alter current plan (see comments)  [x] Plan of care initiated [] Hold pending MD visit [] Discharge  Frequency/Duration:  2 days per week for 12 Weeks:  Interventions:  PT 1x/wk--being conservative with visits due to having L  TKR--in Nov??  Initial PO protocol for R knee TKR    Gait training      Electronically signed by: Roma Gay PTA      *Note: If patient does not return for scheduled / recommended follow up visit, this note will serve as their discharge note from physical therapy.

## 2019-08-29 ENCOUNTER — APPOINTMENT (OUTPATIENT)
Dept: PHYSICAL THERAPY | Age: 64
End: 2019-08-29
Payer: COMMERCIAL

## 2019-09-03 ENCOUNTER — HOSPITAL ENCOUNTER (OUTPATIENT)
Dept: PHYSICAL THERAPY | Age: 64
Setting detail: THERAPIES SERIES
Discharge: HOME | End: 2019-09-03
Payer: COMMERCIAL

## 2019-09-03 LAB
INR BLD: 1.5 (ref 0.8–1.2)
PROTHROMBIN TIME: 18.4 SECONDS (ref 11.7–14.2)

## 2019-09-03 PROCEDURE — G0283 ELEC STIM OTHER THAN WOUND: HCPCS | Performed by: PHYSICAL THERAPY ASSISTANT

## 2019-09-03 PROCEDURE — 97112 NEUROMUSCULAR REEDUCATION: CPT | Performed by: PHYSICAL THERAPY ASSISTANT

## 2019-09-03 PROCEDURE — 97110 THERAPEUTIC EXERCISES: CPT | Performed by: PHYSICAL THERAPY ASSISTANT

## 2019-09-03 PROCEDURE — 97530 THERAPEUTIC ACTIVITIES: CPT | Performed by: PHYSICAL THERAPY ASSISTANT

## 2019-09-03 NOTE — FLOWSHEET NOTE
bottom    Charges:  Timed Code Treatment Minutes: 61'   Total Treatment Minutes: 76'     [] EVAL: L2  [x] RJ(23248) x  2   [] IONTO  [x] NMR (25731) x  1   [] VASO  [] Manual (35037) x       [] Other:  [x] TA x  1    [] Mech Traction (30976)  [] ES(attended) (44390)      [x] ES (un) (79174):      GOALS:  Patient stated goal: Would like to resume her walking/ fitness program     Therapist goals for Patient:   Short Term Goals: To be achieved in: 2 weeks  1. Independent in HEP and progression per patient tolerance, in order to prevent re-injury. 2. Patient will have a decrease in pain to facilitate improvement in movement, function, and ADLs as indicated by Functional Deficits.     Long Term Goals: To be achieved in: 12 weeks PO  1. Disability index score of 40% or less for the LEFS to assist with reaching prior level of function. 2. Patient will demonstrate increased AROM to 0-125-130 deg to allow for proper joint functioning as indicated by patients Functional Deficits. 3. Patient will demonstrate an increase in Strength to good proximal hip strength and control in LE to allow for proper functional mobility as indicated by patients Functional Deficits. 4. Patient will return to 60%> functional activities without increased symptoms or restriction. 5. Patient will resume a fitness program using AHA guidelines and OA precautions including, but not limited to strength and NI cardio. Progression Towards Functional goals:  [x] Patient is progressing as expected towards functional goals listed. [x] Progression is slowed due to complexities listed. [] Progression has been slowed due to co-morbidities. [] Plan just implemented, too soon to assess goals progression  [x] Other: Noted early calf pain (DVT) with medical treatment; which created early difficulty with achieving ROM-> now progressing.     ASSESSMENT:   Functional Impairments:                [x]Noted lumbar/proximal hip/LE

## 2019-09-10 ENCOUNTER — HOSPITAL ENCOUNTER (OUTPATIENT)
Dept: PHYSICAL THERAPY | Age: 64
Setting detail: THERAPIES SERIES
Discharge: HOME | End: 2019-09-10
Payer: COMMERCIAL

## 2019-09-10 ENCOUNTER — OFFICE VISIT (OUTPATIENT)
Dept: ORTHOPEDIC SURGERY | Age: 64
End: 2019-09-10

## 2019-09-10 VITALS
WEIGHT: 165.34 LBS | DIASTOLIC BLOOD PRESSURE: 84 MMHG | HEART RATE: 84 BPM | BODY MASS INDEX: 30.43 KG/M2 | HEIGHT: 62 IN | SYSTOLIC BLOOD PRESSURE: 126 MMHG

## 2019-09-10 DIAGNOSIS — M25.561 RIGHT KNEE PAIN, UNSPECIFIED CHRONICITY: ICD-10-CM

## 2019-09-10 DIAGNOSIS — Z96.651 STATUS POST TOTAL RIGHT KNEE REPLACEMENT: Primary | ICD-10-CM

## 2019-09-10 PROCEDURE — 99024 POSTOP FOLLOW-UP VISIT: CPT | Performed by: ORTHOPAEDIC SURGERY

## 2019-09-10 PROCEDURE — 97530 THERAPEUTIC ACTIVITIES: CPT | Performed by: PHYSICAL THERAPY ASSISTANT

## 2019-09-10 PROCEDURE — 97112 NEUROMUSCULAR REEDUCATION: CPT | Performed by: PHYSICAL THERAPY ASSISTANT

## 2019-09-10 PROCEDURE — 97110 THERAPEUTIC EXERCISES: CPT | Performed by: PHYSICAL THERAPY ASSISTANT

## 2019-09-10 NOTE — PROGRESS NOTES
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 Substance and Sexual Activity    Alcohol use: Yes     Alcohol/week: 0.0 standard drinks     Frequency: 2-4 times a month     Drinks per session: 1 or 2     Comment: I drink only occasionally, less than once a month.  Drug use: No    Sexual activity: Yes     Partners: Male   Lifestyle    Physical activity:     Days per week: None     Minutes per session: None    Stress: None   Relationships    Social connections:     Talks on phone: None     Gets together: None     Attends Evangelical service: None     Active member of club or organization: None     Attends meetings of clubs or organizations: None     Relationship status: None    Intimate partner violence:     Fear of current or ex partner: None     Emotionally abused: None     Physically abused: None     Forced sexual activity: None   Other Topics Concern    None   Social History Narrative    Lives in [place] with .     3 daughters ages 22,19, 29       Current Outpatient Medications   Medication Sig Dispense Refill    levothyroxine (SYNTHROID) 100 MCG tablet TAKE ONE TABLET BY MOUTH DAILY 30 tablet 2    Omega-3 Fat Ac-Cholecalciferol (DRY EYE OMEGA BENEFITS/VIT D-3) 428-921 MG-UNIT CAPS 2 capsules by mouth twice daily before meals      Propylene Glycol (SYSTANE COMPLETE) 0.6 % SOLN instill 1 Drop by Ophthalmic route 3-4 times every day OU      amLODIPine (NORVASC) 5 MG tablet TAKE ONE TABLET BY MOUTH DAILY 30 tablet 5    lisinopril-hydrochlorothiazide (PRINZIDE;ZESTORETIC) 10-12.5 MG per tablet TAKE ONE TABLET BY MOUTH DAILY 30 tablet 1    Multiple Vitamins-Minerals (MULTIVITAMIN PO) Take  by mouth daily.  Calcium Carbonate-Vit D-Min (CALTRATE 600+D PLUS) 600-400 MG-UNIT TABS Take  by mouth. Indications: one by mouth twice a day      aspirin EC 81 MG EC tablet Take 1 tablet by mouth 2 times daily 60 tablet 0    omeprazole (PRILOSEC) 40 MG delayed release capsule Take 1 capsule by mouth daily as needed (Heartburn, reflux) 14 capsule 0     No current facility-administered medications for this visit. Allergies   Allergen Reactions    Tussionex Pennkinetic Er [Hydrocod Polst-Cpm Polst Er]     Bactrim [Sulfamethoxazole-Trimethoprim] Rash    Doxycycline Hyclate Nausea And Vomiting    Jose-Tab [Erythromycin] Nausea And Vomiting    Sulfa Antibiotics Rash       Review of Systems:  A 14 point review of systems was completed by the patient and is available in the media section of the scanned medical record and was reviewed on 9/10/2019. The review is negative with the exception of those things mentioned in the HPI and Past Medical History     Vital Signs:   /84   Pulse 84   Ht 5' 2.01\" (1.575 m)   Wt 165 lb 5.5 oz (75 kg)   BMI 30.23 kg/m²     General Exam:   Mental Status: The patient is oriented to time, place and person. The patient's mood and affect are appropriate. Neurological: The patient has good coordination. There is no weakness or sensory deficit. Knee Examination: Left    Skin:  There are no skin lesions, cellulitis, or extreme edema in the lower extremities.  Sensation is grossly intact

## 2019-09-10 NOTE — FLOWSHEET NOTE
consistent with functional hip weakness/NMR control                     []Signs/symptoms consistent with tendinitis/tendinosis                         []signs/symptoms consistent with pathology which may benefit from Dry needling                  []other:       Prognosis/Rehab Potential:                                       []Excellent              [x]Good                 [x]Fair              []Poor     Tolerance of evaluation/treatment:   [x] Patient tolerated treatment well [x] Patient limited by fatique  [] Patient limited by pain  [] Patient limited by other medical complications  [x] Other: Tolerates program well. Fatigued at end of Tx. Asked/answered all questions. Pt requires PT follow up to address ROM, strength and functional mobility deficits. Prognosis: [x] Good [x] Fair  [] Poor    Patient Requires Follow-up: [x] Yes  [] No    PLAN:   [] Continue per plan of care [] Alter current plan (see comments)  [x] Plan of care initiated [] Hold pending MD visit [] Discharge  Frequency/Duration:  2 days per week for 12 Weeks:  Interventions:  PT 1x/wk>>every other week--being conservative with visits due to having L  TKR--in Nov??  Initial PO protocol for R knee TKR      Electronically signed by: Jatin Hadley PTA      *Note: If patient does not return for scheduled / recommended follow up visit, this note will serve as their discharge note from physical therapy.

## 2019-09-24 ENCOUNTER — HOSPITAL ENCOUNTER (OUTPATIENT)
Dept: PHYSICAL THERAPY | Age: 64
Setting detail: THERAPIES SERIES
Discharge: HOME | End: 2019-09-24
Payer: COMMERCIAL

## 2019-09-24 PROCEDURE — 97112 NEUROMUSCULAR REEDUCATION: CPT | Performed by: PHYSICAL THERAPY ASSISTANT

## 2019-09-24 PROCEDURE — 97110 THERAPEUTIC EXERCISES: CPT | Performed by: PHYSICAL THERAPY ASSISTANT

## 2019-09-26 DIAGNOSIS — M17.12 PRIMARY OSTEOARTHRITIS OF LEFT KNEE: Primary | ICD-10-CM

## 2019-10-01 ENCOUNTER — HOSPITAL ENCOUNTER (OUTPATIENT)
Dept: GENERAL RADIOLOGY | Age: 64
Discharge: HOME | End: 2019-10-01
Payer: COMMERCIAL

## 2019-10-01 ENCOUNTER — HOSPITAL ENCOUNTER (OUTPATIENT)
Age: 64
Discharge: HOME | End: 2019-10-01
Payer: COMMERCIAL

## 2019-10-01 DIAGNOSIS — M17.12 OSTEOARTHRITIS OF LEFT KNEE, UNSPECIFIED OSTEOARTHRITIS TYPE: ICD-10-CM

## 2019-10-01 PROCEDURE — 77073 BONE LENGTH STUDIES: CPT

## 2019-10-15 ENCOUNTER — HOSPITAL ENCOUNTER (OUTPATIENT)
Dept: PHYSICAL THERAPY | Age: 64
Setting detail: THERAPIES SERIES
Discharge: HOME | End: 2019-10-15
Payer: COMMERCIAL

## 2019-10-15 PROCEDURE — 97112 NEUROMUSCULAR REEDUCATION: CPT | Performed by: PHYSICAL THERAPY ASSISTANT

## 2019-10-15 PROCEDURE — 97110 THERAPEUTIC EXERCISES: CPT | Performed by: PHYSICAL THERAPY ASSISTANT

## 2019-10-15 PROCEDURE — 97530 THERAPEUTIC ACTIVITIES: CPT | Performed by: PHYSICAL THERAPY ASSISTANT

## 2019-10-15 PROCEDURE — 97750 PHYSICAL PERFORMANCE TEST: CPT | Performed by: PHYSICAL THERAPY ASSISTANT

## 2019-10-23 ENCOUNTER — OFFICE VISIT (OUTPATIENT)
Dept: INTERNAL MEDICINE CLINIC | Age: 64
End: 2019-10-23
Payer: COMMERCIAL

## 2019-10-23 VITALS
DIASTOLIC BLOOD PRESSURE: 62 MMHG | SYSTOLIC BLOOD PRESSURE: 132 MMHG | OXYGEN SATURATION: 96 % | HEIGHT: 62 IN | BODY MASS INDEX: 30.42 KG/M2 | HEART RATE: 62 BPM | WEIGHT: 165.3 LBS

## 2019-10-23 DIAGNOSIS — I10 ESSENTIAL HYPERTENSION: ICD-10-CM

## 2019-10-23 DIAGNOSIS — E78.5 HYPERLIPIDEMIA, UNSPECIFIED HYPERLIPIDEMIA TYPE: ICD-10-CM

## 2019-10-23 DIAGNOSIS — G47.33 OBSTRUCTIVE SLEEP APNEA SYNDROME: ICD-10-CM

## 2019-10-23 DIAGNOSIS — E03.9 ACQUIRED HYPOTHYROIDISM: ICD-10-CM

## 2019-10-23 DIAGNOSIS — M17.12 PRIMARY OSTEOARTHRITIS OF LEFT KNEE: ICD-10-CM

## 2019-10-23 DIAGNOSIS — Z01.818 PREOP EXAMINATION: ICD-10-CM

## 2019-10-23 DIAGNOSIS — I82.4Z1 ACUTE DEEP VEIN THROMBOSIS (DVT) OF DISTAL END OF RIGHT LOWER EXTREMITY (HCC): ICD-10-CM

## 2019-10-23 PROCEDURE — 99244 OFF/OP CNSLTJ NEW/EST MOD 40: CPT | Performed by: NURSE PRACTITIONER

## 2019-10-23 ASSESSMENT — ENCOUNTER SYMPTOMS
ABDOMINAL PAIN: 0
DIARRHEA: 0
SHORTNESS OF BREATH: 0
CONSTIPATION: 0
COLOR CHANGE: 0
SINUS PAIN: 0
BACK PAIN: 0
WHEEZING: 0
COUGH: 0
SINUS PRESSURE: 0

## 2019-10-23 ASSESSMENT — PATIENT HEALTH QUESTIONNAIRE - PHQ9
SUM OF ALL RESPONSES TO PHQ QUESTIONS 1-9: 0
DEPRESSION UNABLE TO ASSESS: FUNCTIONAL CAPACITY MOTIVATION LIMITS ACCURACY
SUM OF ALL RESPONSES TO PHQ9 QUESTIONS 1 & 2: 0
1. LITTLE INTEREST OR PLEASURE IN DOING THINGS: 0
SUM OF ALL RESPONSES TO PHQ QUESTIONS 1-9: 0
2. FEELING DOWN, DEPRESSED OR HOPELESS: 0

## 2019-10-29 ENCOUNTER — TELEPHONE (OUTPATIENT)
Dept: ORTHOPEDIC SURGERY | Age: 64
End: 2019-10-29

## 2019-11-03 ENCOUNTER — ANESTHESIA EVENT (OUTPATIENT)
Dept: OPERATING ROOM | Age: 64
End: 2019-11-03
Payer: COMMERCIAL

## 2019-11-04 ENCOUNTER — HOSPITAL ENCOUNTER (OUTPATIENT)
Dept: PREADMISSION TESTING | Age: 64
Discharge: HOME | End: 2019-11-08
Payer: COMMERCIAL

## 2019-11-04 VITALS
HEIGHT: 61 IN | SYSTOLIC BLOOD PRESSURE: 126 MMHG | OXYGEN SATURATION: 98 % | DIASTOLIC BLOOD PRESSURE: 70 MMHG | HEART RATE: 64 BPM | BODY MASS INDEX: 31.45 KG/M2 | RESPIRATION RATE: 18 BRPM | WEIGHT: 166.56 LBS | TEMPERATURE: 96.7 F

## 2019-11-04 LAB
ALBUMIN SERPL-MCNC: 4.3 G/DL (ref 3.4–5)
ALP BLD-CCNC: 77 U/L (ref 40–129)
ALT SERPL-CCNC: 11 U/L (ref 10–40)
ANION GAP SERPL CALCULATED.3IONS-SCNC: 12 MMOL/L (ref 3–16)
APTT: 29.9 SEC (ref 26–36)
AST SERPL-CCNC: 19 U/L (ref 15–37)
BILIRUB SERPL-MCNC: 0.7 MG/DL (ref 0–1)
BILIRUBIN DIRECT: <0.2 MG/DL (ref 0–0.3)
BILIRUBIN, INDIRECT: NORMAL MG/DL (ref 0–1)
BUN BLDV-MCNC: 13 MG/DL (ref 7–20)
CALCIUM SERPL-MCNC: 9.4 MG/DL (ref 8.3–10.6)
CHLORIDE BLD-SCNC: 102 MMOL/L (ref 99–110)
CO2: 27 MMOL/L (ref 21–32)
CREAT SERPL-MCNC: 0.7 MG/DL (ref 0.6–1.2)
EKG ATRIAL RATE: 62 BPM
EKG DIAGNOSIS: NORMAL
EKG P AXIS: 49 DEGREES
EKG P-R INTERVAL: 140 MS
EKG Q-T INTERVAL: 416 MS
EKG QRS DURATION: 96 MS
EKG QTC CALCULATION (BAZETT): 422 MS
EKG R AXIS: 37 DEGREES
EKG T AXIS: 55 DEGREES
EKG VENTRICULAR RATE: 62 BPM
GFR AFRICAN AMERICAN: >60
GFR NON-AFRICAN AMERICAN: >60
GLUCOSE BLD-MCNC: 140 MG/DL (ref 70–99)
HCT VFR BLD CALC: 39.8 % (ref 36–48)
HEMOGLOBIN: 13.4 G/DL (ref 12–16)
INR BLD: 0.93 (ref 0.86–1.14)
MCH RBC QN AUTO: 30.8 PG (ref 26–34)
MCHC RBC AUTO-ENTMCNC: 33.6 G/DL (ref 31–36)
MCV RBC AUTO: 91.4 FL (ref 80–100)
PDW BLD-RTO: 12.8 % (ref 12.4–15.4)
PLATELET # BLD: 311 K/UL (ref 135–450)
PMV BLD AUTO: 6.8 FL (ref 5–10.5)
POTASSIUM SERPL-SCNC: 3.7 MMOL/L (ref 3.5–5.1)
PROTHROMBIN TIME: 10.6 SEC (ref 9.8–13)
RBC # BLD: 4.35 M/UL (ref 4–5.2)
SODIUM BLD-SCNC: 141 MMOL/L (ref 136–145)
TOTAL PROTEIN: 7.6 G/DL (ref 6.4–8.2)
WBC # BLD: 6.1 K/UL (ref 4–11)

## 2019-11-04 PROCEDURE — 80048 BASIC METABOLIC PNL TOTAL CA: CPT

## 2019-11-04 PROCEDURE — 87641 MR-STAPH DNA AMP PROBE: CPT

## 2019-11-04 PROCEDURE — 93005 ELECTROCARDIOGRAM TRACING: CPT | Performed by: ORTHOPAEDIC SURGERY

## 2019-11-04 PROCEDURE — 85610 PROTHROMBIN TIME: CPT

## 2019-11-04 PROCEDURE — 85027 COMPLETE CBC AUTOMATED: CPT

## 2019-11-04 PROCEDURE — 85730 THROMBOPLASTIN TIME PARTIAL: CPT

## 2019-11-04 PROCEDURE — 93010 ELECTROCARDIOGRAM REPORT: CPT | Performed by: INTERNAL MEDICINE

## 2019-11-04 PROCEDURE — 80076 HEPATIC FUNCTION PANEL: CPT

## 2019-11-05 ENCOUNTER — OFFICE VISIT (OUTPATIENT)
Dept: ORTHOPEDIC SURGERY | Age: 64
End: 2019-11-05

## 2019-11-05 VITALS
SYSTOLIC BLOOD PRESSURE: 126 MMHG | WEIGHT: 166.67 LBS | HEIGHT: 61 IN | BODY MASS INDEX: 31.47 KG/M2 | DIASTOLIC BLOOD PRESSURE: 69 MMHG | HEART RATE: 68 BPM

## 2019-11-05 DIAGNOSIS — Z01.818 PRE-OP EXAM: Primary | ICD-10-CM

## 2019-11-05 DIAGNOSIS — M17.12 PRIMARY OSTEOARTHRITIS OF LEFT KNEE: ICD-10-CM

## 2019-11-05 LAB — MRSA SCREEN RT-PCR: NORMAL

## 2019-11-05 PROCEDURE — 99024 POSTOP FOLLOW-UP VISIT: CPT | Performed by: ORTHOPAEDIC SURGERY

## 2019-11-05 PROCEDURE — MISC250 COMPRESSION STOCKING: Performed by: ORTHOPAEDIC SURGERY

## 2019-11-06 ENCOUNTER — HOSPITAL ENCOUNTER (OUTPATIENT)
Age: 64
Setting detail: OBSERVATION
Discharge: HOME | End: 2019-11-07
Attending: ORTHOPAEDIC SURGERY | Admitting: ORTHOPAEDIC SURGERY
Payer: COMMERCIAL

## 2019-11-06 ENCOUNTER — ANESTHESIA (OUTPATIENT)
Dept: OPERATING ROOM | Age: 64
End: 2019-11-06
Payer: COMMERCIAL

## 2019-11-06 VITALS — DIASTOLIC BLOOD PRESSURE: 61 MMHG | TEMPERATURE: 94.3 F | SYSTOLIC BLOOD PRESSURE: 119 MMHG | OXYGEN SATURATION: 96 %

## 2019-11-06 DIAGNOSIS — Z96.652 S/P TKR (TOTAL KNEE REPLACEMENT) USING CEMENT, LEFT: Primary | ICD-10-CM

## 2019-11-06 LAB
INR BLD: 1.27 (ref 0.86–1.14)
PROTHROMBIN TIME: 14.5 SEC (ref 9.8–13)

## 2019-11-06 PROCEDURE — 2580000003 HC RX 258: Performed by: ANESTHESIOLOGY

## 2019-11-06 PROCEDURE — 2500000003 HC RX 250 WO HCPCS: Performed by: ORTHOPAEDIC SURGERY

## 2019-11-06 PROCEDURE — 3700000000 HC ANESTHESIA ATTENDED CARE: Performed by: ORTHOPAEDIC SURGERY

## 2019-11-06 PROCEDURE — 2580000003 HC RX 258: Performed by: ORTHOPAEDIC SURGERY

## 2019-11-06 PROCEDURE — 6360000002 HC RX W HCPCS: Performed by: ORTHOPAEDIC SURGERY

## 2019-11-06 PROCEDURE — 2720000010 HC SURG SUPPLY STERILE: Performed by: ORTHOPAEDIC SURGERY

## 2019-11-06 PROCEDURE — 85610 PROTHROMBIN TIME: CPT

## 2019-11-06 PROCEDURE — G0378 HOSPITAL OBSERVATION PER HR: HCPCS

## 2019-11-06 PROCEDURE — C1713 ANCHOR/SCREW BN/BN,TIS/BN: HCPCS | Performed by: ORTHOPAEDIC SURGERY

## 2019-11-06 PROCEDURE — 2500000003 HC RX 250 WO HCPCS: Performed by: NURSE ANESTHETIST, CERTIFIED REGISTERED

## 2019-11-06 PROCEDURE — 1200000000 HC SEMI PRIVATE

## 2019-11-06 PROCEDURE — 6360000002 HC RX W HCPCS

## 2019-11-06 PROCEDURE — 3600000014 HC SURGERY LEVEL 4 ADDTL 15MIN: Performed by: ORTHOPAEDIC SURGERY

## 2019-11-06 PROCEDURE — 3700000001 HC ADD 15 MINUTES (ANESTHESIA): Performed by: ORTHOPAEDIC SURGERY

## 2019-11-06 PROCEDURE — 7100000001 HC PACU RECOVERY - ADDTL 15 MIN: Performed by: ORTHOPAEDIC SURGERY

## 2019-11-06 PROCEDURE — 3600000004 HC SURGERY LEVEL 4 BASE: Performed by: ORTHOPAEDIC SURGERY

## 2019-11-06 PROCEDURE — 6360000002 HC RX W HCPCS: Performed by: NURSE ANESTHETIST, CERTIFIED REGISTERED

## 2019-11-06 PROCEDURE — C1776 JOINT DEVICE (IMPLANTABLE): HCPCS | Performed by: ORTHOPAEDIC SURGERY

## 2019-11-06 PROCEDURE — 6370000000 HC RX 637 (ALT 250 FOR IP): Performed by: ORTHOPAEDIC SURGERY

## 2019-11-06 PROCEDURE — 64447 NJX AA&/STRD FEMORAL NRV IMG: CPT | Performed by: ANESTHESIOLOGY

## 2019-11-06 PROCEDURE — 36415 COLL VENOUS BLD VENIPUNCTURE: CPT

## 2019-11-06 PROCEDURE — 2709999900 HC NON-CHARGEABLE SUPPLY: Performed by: ORTHOPAEDIC SURGERY

## 2019-11-06 PROCEDURE — 7100000000 HC PACU RECOVERY - FIRST 15 MIN: Performed by: ORTHOPAEDIC SURGERY

## 2019-11-06 PROCEDURE — 6360000002 HC RX W HCPCS: Performed by: ANESTHESIOLOGY

## 2019-11-06 DEVICE — COMPONENT TOT KNEE CAPPED ADV OXIN NP JOURNEY II: Type: IMPLANTABLE DEVICE | Site: KNEE | Status: FUNCTIONAL

## 2019-11-06 DEVICE — CEMENT BNE 20ML 41GM FULL DOSE PMMA W/ TOBRA M VISC RADPQ: Type: IMPLANTABLE DEVICE | Site: KNEE | Status: FUNCTIONAL

## 2019-11-06 DEVICE — JOURNEY TIBIAL BASEPLATE NONPOROUS                                    LT SZ 2
Type: IMPLANTABLE DEVICE | Site: KNEE | Status: FUNCTIONAL
Brand: JOURNEY

## 2019-11-06 DEVICE — JOURNEY II BCS XLPE ARTICULAR                                    INSERT SIZE 1-2 LEFT 9MM
Type: IMPLANTABLE DEVICE | Site: KNEE | Status: FUNCTIONAL
Brand: JOURNEY

## 2019-11-06 DEVICE — NON RIMMED SPEED PIN 65MM STERILE: Type: IMPLANTABLE DEVICE | Site: KNEE | Status: FUNCTIONAL

## 2019-11-06 DEVICE — JOURNEY II BCS FEMORAL OXINIUM                                    LEFT SIZE 4
Type: IMPLANTABLE DEVICE | Site: KNEE | Status: FUNCTIONAL
Brand: JOURNEY

## 2019-11-06 DEVICE — JOURNEY 7.5 ROUND RESURF PAT 32MM STANDARD
Type: IMPLANTABLE DEVICE | Site: KNEE | Status: FUNCTIONAL
Brand: JOURNEY

## 2019-11-06 RX ORDER — TRANEXAMIC ACID 100 MG/ML
INJECTION, SOLUTION INTRAVENOUS PRN
Status: DISCONTINUED | OUTPATIENT
Start: 2019-11-06 | End: 2019-11-06 | Stop reason: ALTCHOICE

## 2019-11-06 RX ORDER — DIPHENHYDRAMINE HYDROCHLORIDE 50 MG/ML
12.5 INJECTION INTRAMUSCULAR; INTRAVENOUS
Status: DISCONTINUED | OUTPATIENT
Start: 2019-11-06 | End: 2019-11-06 | Stop reason: HOSPADM

## 2019-11-06 RX ORDER — HYDROMORPHONE HCL 110MG/55ML
PATIENT CONTROLLED ANALGESIA SYRINGE INTRAVENOUS PRN
Status: DISCONTINUED | OUTPATIENT
Start: 2019-11-06 | End: 2019-11-06 | Stop reason: SDUPTHER

## 2019-11-06 RX ORDER — ACETAMINOPHEN 325 MG/1
650 TABLET ORAL EVERY 6 HOURS
Status: DISCONTINUED | OUTPATIENT
Start: 2019-11-06 | End: 2019-11-07 | Stop reason: HOSPADM

## 2019-11-06 RX ORDER — ACETAMINOPHEN 10 MG/ML
1000 INJECTION, SOLUTION INTRAVENOUS ONCE
Status: COMPLETED | OUTPATIENT
Start: 2019-11-06 | End: 2019-11-06

## 2019-11-06 RX ORDER — OXYCODONE HYDROCHLORIDE 5 MG/1
10 TABLET ORAL PRN
Status: DISCONTINUED | OUTPATIENT
Start: 2019-11-06 | End: 2019-11-06 | Stop reason: HOSPADM

## 2019-11-06 RX ORDER — SENNA AND DOCUSATE SODIUM 50; 8.6 MG/1; MG/1
1 TABLET, FILM COATED ORAL 2 TIMES DAILY
Status: DISCONTINUED | OUTPATIENT
Start: 2019-11-06 | End: 2019-11-07 | Stop reason: HOSPADM

## 2019-11-06 RX ORDER — WARFARIN SODIUM 5 MG/1
5 TABLET ORAL DAILY
Status: DISCONTINUED | OUTPATIENT
Start: 2019-11-06 | End: 2019-11-07 | Stop reason: HOSPADM

## 2019-11-06 RX ORDER — PROMETHAZINE HYDROCHLORIDE 25 MG/ML
6.25 INJECTION, SOLUTION INTRAMUSCULAR; INTRAVENOUS
Status: DISCONTINUED | OUTPATIENT
Start: 2019-11-06 | End: 2019-11-06 | Stop reason: HOSPADM

## 2019-11-06 RX ORDER — VANCOMYCIN HYDROCHLORIDE 500 MG/10ML
INJECTION, POWDER, LYOPHILIZED, FOR SOLUTION INTRAVENOUS PRN
Status: DISCONTINUED | OUTPATIENT
Start: 2019-11-06 | End: 2019-11-06 | Stop reason: ALTCHOICE

## 2019-11-06 RX ORDER — DOCUSATE SODIUM 100 MG/1
100 CAPSULE, LIQUID FILLED ORAL 2 TIMES DAILY
Status: DISCONTINUED | OUTPATIENT
Start: 2019-11-06 | End: 2019-11-07 | Stop reason: HOSPADM

## 2019-11-06 RX ORDER — ROPIVACAINE HYDROCHLORIDE 5 MG/ML
INJECTION, SOLUTION EPIDURAL; INFILTRATION; PERINEURAL
Status: DISPENSED
Start: 2019-11-06 | End: 2019-11-07

## 2019-11-06 RX ORDER — DEXAMETHASONE SODIUM PHOSPHATE 4 MG/ML
INJECTION, SOLUTION INTRA-ARTICULAR; INTRALESIONAL; INTRAMUSCULAR; INTRAVENOUS; SOFT TISSUE PRN
Status: DISCONTINUED | OUTPATIENT
Start: 2019-11-06 | End: 2019-11-06 | Stop reason: SDUPTHER

## 2019-11-06 RX ORDER — OXYCODONE HYDROCHLORIDE 5 MG/1
5 TABLET ORAL PRN
Status: DISCONTINUED | OUTPATIENT
Start: 2019-11-06 | End: 2019-11-06 | Stop reason: HOSPADM

## 2019-11-06 RX ORDER — LEVOTHYROXINE SODIUM 0.1 MG/1
100 TABLET ORAL DAILY
Status: DISCONTINUED | OUTPATIENT
Start: 2019-11-07 | End: 2019-11-07 | Stop reason: HOSPADM

## 2019-11-06 RX ORDER — SODIUM CHLORIDE 0.9 % (FLUSH) 0.9 %
10 SYRINGE (ML) INJECTION EVERY 12 HOURS SCHEDULED
Status: DISCONTINUED | OUTPATIENT
Start: 2019-11-06 | End: 2019-11-07 | Stop reason: HOSPADM

## 2019-11-06 RX ORDER — LIDOCAINE HYDROCHLORIDE 20 MG/ML
INJECTION, SOLUTION INTRAVENOUS PRN
Status: DISCONTINUED | OUTPATIENT
Start: 2019-11-06 | End: 2019-11-06 | Stop reason: SDUPTHER

## 2019-11-06 RX ORDER — GLYCOPYRROLATE 1 MG/5 ML
SYRINGE (ML) INTRAVENOUS PRN
Status: DISCONTINUED | OUTPATIENT
Start: 2019-11-06 | End: 2019-11-06 | Stop reason: SDUPTHER

## 2019-11-06 RX ORDER — POLYETHYLENE GLYCOL 3350 17 G/17G
17 POWDER, FOR SOLUTION ORAL DAILY PRN
Status: DISCONTINUED | OUTPATIENT
Start: 2019-11-06 | End: 2019-11-07 | Stop reason: HOSPADM

## 2019-11-06 RX ORDER — PROPOFOL 10 MG/ML
INJECTION, EMULSION INTRAVENOUS PRN
Status: DISCONTINUED | OUTPATIENT
Start: 2019-11-06 | End: 2019-11-06 | Stop reason: SDUPTHER

## 2019-11-06 RX ORDER — MORPHINE SULFATE 4 MG/ML
1 INJECTION, SOLUTION INTRAMUSCULAR; INTRAVENOUS EVERY 5 MIN PRN
Status: DISCONTINUED | OUTPATIENT
Start: 2019-11-06 | End: 2019-11-06 | Stop reason: HOSPADM

## 2019-11-06 RX ORDER — FENTANYL CITRATE 50 UG/ML
INJECTION, SOLUTION INTRAMUSCULAR; INTRAVENOUS PRN
Status: DISCONTINUED | OUTPATIENT
Start: 2019-11-06 | End: 2019-11-06 | Stop reason: SDUPTHER

## 2019-11-06 RX ORDER — SODIUM CHLORIDE, SODIUM LACTATE, POTASSIUM CHLORIDE, CALCIUM CHLORIDE 600; 310; 30; 20 MG/100ML; MG/100ML; MG/100ML; MG/100ML
INJECTION, SOLUTION INTRAVENOUS CONTINUOUS
Status: DISCONTINUED | OUTPATIENT
Start: 2019-11-06 | End: 2019-11-06

## 2019-11-06 RX ORDER — ONDANSETRON 2 MG/ML
4 INJECTION INTRAMUSCULAR; INTRAVENOUS EVERY 6 HOURS PRN
Status: DISCONTINUED | OUTPATIENT
Start: 2019-11-06 | End: 2019-11-07 | Stop reason: HOSPADM

## 2019-11-06 RX ORDER — ONDANSETRON 2 MG/ML
INJECTION INTRAMUSCULAR; INTRAVENOUS PRN
Status: DISCONTINUED | OUTPATIENT
Start: 2019-11-06 | End: 2019-11-06 | Stop reason: SDUPTHER

## 2019-11-06 RX ORDER — LABETALOL 20 MG/4 ML (5 MG/ML) INTRAVENOUS SYRINGE
5 EVERY 10 MIN PRN
Status: DISCONTINUED | OUTPATIENT
Start: 2019-11-06 | End: 2019-11-06 | Stop reason: HOSPADM

## 2019-11-06 RX ORDER — MAGNESIUM HYDROXIDE 1200 MG/15ML
LIQUID ORAL CONTINUOUS PRN
Status: COMPLETED | OUTPATIENT
Start: 2019-11-06 | End: 2019-11-06

## 2019-11-06 RX ORDER — DEXAMETHASONE SODIUM PHOSPHATE 4 MG/ML
3 INJECTION, SOLUTION INTRA-ARTICULAR; INTRALESIONAL; INTRAMUSCULAR; INTRAVENOUS; SOFT TISSUE EVERY 12 HOURS
Status: DISCONTINUED | OUTPATIENT
Start: 2019-11-07 | End: 2019-11-07 | Stop reason: HOSPADM

## 2019-11-06 RX ORDER — DEXAMETHASONE SODIUM PHOSPHATE 4 MG/ML
6 INJECTION, SOLUTION INTRA-ARTICULAR; INTRALESIONAL; INTRAMUSCULAR; INTRAVENOUS; SOFT TISSUE ONCE
Status: COMPLETED | OUTPATIENT
Start: 2019-11-06 | End: 2019-11-06

## 2019-11-06 RX ORDER — MIDAZOLAM HYDROCHLORIDE 1 MG/ML
INJECTION INTRAMUSCULAR; INTRAVENOUS PRN
Status: DISCONTINUED | OUTPATIENT
Start: 2019-11-06 | End: 2019-11-06 | Stop reason: SDUPTHER

## 2019-11-06 RX ORDER — METOCLOPRAMIDE HYDROCHLORIDE 5 MG/ML
10 INJECTION INTRAMUSCULAR; INTRAVENOUS
Status: DISCONTINUED | OUTPATIENT
Start: 2019-11-06 | End: 2019-11-06 | Stop reason: HOSPADM

## 2019-11-06 RX ORDER — SODIUM CHLORIDE, SODIUM LACTATE, POTASSIUM CHLORIDE, CALCIUM CHLORIDE 600; 310; 30; 20 MG/100ML; MG/100ML; MG/100ML; MG/100ML
INJECTION, SOLUTION INTRAVENOUS CONTINUOUS
Status: ACTIVE | OUTPATIENT
Start: 2019-11-06 | End: 2019-11-07

## 2019-11-06 RX ORDER — SODIUM CHLORIDE 0.9 % (FLUSH) 0.9 %
10 SYRINGE (ML) INJECTION PRN
Status: DISCONTINUED | OUTPATIENT
Start: 2019-11-06 | End: 2019-11-07 | Stop reason: HOSPADM

## 2019-11-06 RX ORDER — AMLODIPINE BESYLATE 5 MG/1
5 TABLET ORAL DAILY
Status: DISCONTINUED | OUTPATIENT
Start: 2019-11-07 | End: 2019-11-07 | Stop reason: HOSPADM

## 2019-11-06 RX ORDER — OXYCODONE HYDROCHLORIDE 5 MG/1
5 TABLET ORAL EVERY 4 HOURS PRN
Status: DISCONTINUED | OUTPATIENT
Start: 2019-11-06 | End: 2019-11-07 | Stop reason: HOSPADM

## 2019-11-06 RX ORDER — HYDRALAZINE HYDROCHLORIDE 20 MG/ML
5 INJECTION INTRAMUSCULAR; INTRAVENOUS EVERY 10 MIN PRN
Status: DISCONTINUED | OUTPATIENT
Start: 2019-11-06 | End: 2019-11-06 | Stop reason: HOSPADM

## 2019-11-06 RX ORDER — MEPERIDINE HYDROCHLORIDE 25 MG/ML
12.5 INJECTION INTRAMUSCULAR; INTRAVENOUS; SUBCUTANEOUS EVERY 5 MIN PRN
Status: DISCONTINUED | OUTPATIENT
Start: 2019-11-06 | End: 2019-11-06 | Stop reason: HOSPADM

## 2019-11-06 RX ADMIN — HYDROMORPHONE HYDROCHLORIDE 0.5 MG: 2 INJECTION, SOLUTION INTRAMUSCULAR; INTRAVENOUS; SUBCUTANEOUS at 11:51

## 2019-11-06 RX ADMIN — HYDROMORPHONE HYDROCHLORIDE 0.5 MG: 2 INJECTION, SOLUTION INTRAMUSCULAR; INTRAVENOUS; SUBCUTANEOUS at 11:40

## 2019-11-06 RX ADMIN — WARFARIN SODIUM 5 MG: 5 TABLET ORAL at 18:14

## 2019-11-06 RX ADMIN — Medication 2 G: at 11:07

## 2019-11-06 RX ADMIN — Medication 0.1 MG: at 11:07

## 2019-11-06 RX ADMIN — PROPOFOL 100 MG: 10 INJECTION, EMULSION INTRAVENOUS at 11:40

## 2019-11-06 RX ADMIN — SENNOSIDES-DOCUSATE SODIUM TAB 8.6-50 MG 1 TABLET: 8.6-5 TAB at 20:23

## 2019-11-06 RX ADMIN — HYDROMORPHONE HYDROCHLORIDE 0.25 MG: 1 INJECTION, SOLUTION INTRAMUSCULAR; INTRAVENOUS; SUBCUTANEOUS at 14:41

## 2019-11-06 RX ADMIN — FENTANYL CITRATE 100 MCG: 50 INJECTION INTRAMUSCULAR; INTRAVENOUS at 11:07

## 2019-11-06 RX ADMIN — Medication 0.2 MG: at 11:16

## 2019-11-06 RX ADMIN — ACETAMINOPHEN 650 MG: 325 TABLET ORAL at 18:13

## 2019-11-06 RX ADMIN — DEXAMETHASONE SODIUM PHOSPHATE 6 MG: 4 INJECTION, SOLUTION INTRAMUSCULAR; INTRAVENOUS at 22:58

## 2019-11-06 RX ADMIN — ONDANSETRON 4 MG: 2 INJECTION INTRAMUSCULAR; INTRAVENOUS at 11:13

## 2019-11-06 RX ADMIN — DOCUSATE SODIUM 100 MG: 100 CAPSULE, LIQUID FILLED ORAL at 20:23

## 2019-11-06 RX ADMIN — HYDROMORPHONE HYDROCHLORIDE 0.25 MG: 1 INJECTION, SOLUTION INTRAMUSCULAR; INTRAVENOUS; SUBCUTANEOUS at 14:34

## 2019-11-06 RX ADMIN — HYDROMORPHONE HYDROCHLORIDE 0.25 MG: 1 INJECTION, SOLUTION INTRAMUSCULAR; INTRAVENOUS; SUBCUTANEOUS at 14:48

## 2019-11-06 RX ADMIN — HYDROMORPHONE HYDROCHLORIDE 0.4 MG: 2 INJECTION, SOLUTION INTRAMUSCULAR; INTRAVENOUS; SUBCUTANEOUS at 11:21

## 2019-11-06 RX ADMIN — DEXAMETHASONE SODIUM PHOSPHATE 10 MG: 4 INJECTION, SOLUTION INTRAMUSCULAR; INTRAVENOUS at 11:13

## 2019-11-06 RX ADMIN — HYDROMORPHONE HYDROCHLORIDE 0.25 MG: 1 INJECTION, SOLUTION INTRAMUSCULAR; INTRAVENOUS; SUBCUTANEOUS at 14:29

## 2019-11-06 RX ADMIN — ACETAMINOPHEN 650 MG: 325 TABLET ORAL at 22:58

## 2019-11-06 RX ADMIN — HYDROMORPHONE HYDROCHLORIDE 0.6 MG: 2 INJECTION, SOLUTION INTRAMUSCULAR; INTRAVENOUS; SUBCUTANEOUS at 11:30

## 2019-11-06 RX ADMIN — SODIUM CHLORIDE, POTASSIUM CHLORIDE, SODIUM LACTATE AND CALCIUM CHLORIDE: 600; 310; 30; 20 INJECTION, SOLUTION INTRAVENOUS at 20:25

## 2019-11-06 RX ADMIN — SODIUM CHLORIDE, SODIUM LACTATE, POTASSIUM CHLORIDE, AND CALCIUM CHLORIDE: 600; 310; 30; 20 INJECTION, SOLUTION INTRAVENOUS at 12:30

## 2019-11-06 RX ADMIN — MIDAZOLAM HYDROCHLORIDE 2 MG: 2 INJECTION, SOLUTION INTRAMUSCULAR; INTRAVENOUS at 11:02

## 2019-11-06 RX ADMIN — PROPOFOL 30 MG: 10 INJECTION, EMULSION INTRAVENOUS at 11:07

## 2019-11-06 RX ADMIN — ACETAMINOPHEN 1000 MG: 10 INJECTION, SOLUTION INTRAVENOUS at 15:02

## 2019-11-06 RX ADMIN — SODIUM CHLORIDE, SODIUM LACTATE, POTASSIUM CHLORIDE, AND CALCIUM CHLORIDE: 600; 310; 30; 20 INJECTION, SOLUTION INTRAVENOUS at 09:08

## 2019-11-06 RX ADMIN — TRANEXAMIC ACID 1000 MG: 1 INJECTION, SOLUTION INTRAVENOUS at 13:00

## 2019-11-06 RX ADMIN — LIDOCAINE HYDROCHLORIDE 50 MG: 20 INJECTION, SOLUTION INTRAVENOUS at 11:07

## 2019-11-06 ASSESSMENT — PULMONARY FUNCTION TESTS
PIF_VALUE: 10
PIF_VALUE: 13
PIF_VALUE: 11
PIF_VALUE: 13
PIF_VALUE: 11
PIF_VALUE: 10
PIF_VALUE: 11
PIF_VALUE: 2
PIF_VALUE: 11
PIF_VALUE: 11
PIF_VALUE: 10
PIF_VALUE: 11
PIF_VALUE: 11
PIF_VALUE: 10
PIF_VALUE: 14
PIF_VALUE: 16
PIF_VALUE: 10
PIF_VALUE: 11
PIF_VALUE: 11
PIF_VALUE: 13
PIF_VALUE: 10
PIF_VALUE: 5
PIF_VALUE: 11
PIF_VALUE: 13
PIF_VALUE: 11
PIF_VALUE: 11
PIF_VALUE: 12
PIF_VALUE: 12
PIF_VALUE: 11
PIF_VALUE: 11
PIF_VALUE: 12
PIF_VALUE: 0
PIF_VALUE: 11
PIF_VALUE: 13
PIF_VALUE: 11
PIF_VALUE: 4
PIF_VALUE: 10
PIF_VALUE: 11
PIF_VALUE: 4
PIF_VALUE: 13
PIF_VALUE: 11
PIF_VALUE: 10
PIF_VALUE: 11
PIF_VALUE: 17
PIF_VALUE: 4
PIF_VALUE: 12
PIF_VALUE: 10
PIF_VALUE: 13
PIF_VALUE: 11
PIF_VALUE: 0
PIF_VALUE: 11
PIF_VALUE: 11
PIF_VALUE: 10
PIF_VALUE: 9
PIF_VALUE: 11
PIF_VALUE: 10
PIF_VALUE: 11
PIF_VALUE: 10
PIF_VALUE: 11
PIF_VALUE: 17
PIF_VALUE: 0
PIF_VALUE: 11
PIF_VALUE: 3
PIF_VALUE: 11
PIF_VALUE: 10
PIF_VALUE: 11
PIF_VALUE: 10
PIF_VALUE: 11
PIF_VALUE: 10
PIF_VALUE: 1
PIF_VALUE: 11
PIF_VALUE: 12
PIF_VALUE: 11
PIF_VALUE: 10
PIF_VALUE: 11
PIF_VALUE: 11
PIF_VALUE: 12
PIF_VALUE: 11
PIF_VALUE: 11
PIF_VALUE: 10
PIF_VALUE: 17
PIF_VALUE: 16
PIF_VALUE: 11
PIF_VALUE: 13
PIF_VALUE: 12
PIF_VALUE: 11
PIF_VALUE: 11
PIF_VALUE: 4
PIF_VALUE: 11
PIF_VALUE: 11
PIF_VALUE: 12
PIF_VALUE: 11
PIF_VALUE: 10
PIF_VALUE: 11
PIF_VALUE: 24
PIF_VALUE: 13
PIF_VALUE: 0
PIF_VALUE: 11
PIF_VALUE: 13
PIF_VALUE: 11
PIF_VALUE: 10
PIF_VALUE: 11
PIF_VALUE: 13
PIF_VALUE: 11
PIF_VALUE: 11
PIF_VALUE: 1
PIF_VALUE: 11
PIF_VALUE: 10
PIF_VALUE: 11
PIF_VALUE: 11
PIF_VALUE: 10
PIF_VALUE: 11
PIF_VALUE: 10
PIF_VALUE: 11
PIF_VALUE: 14
PIF_VALUE: 11
PIF_VALUE: 13
PIF_VALUE: 11
PIF_VALUE: 11
PIF_VALUE: 12
PIF_VALUE: 11
PIF_VALUE: 0
PIF_VALUE: 1
PIF_VALUE: 11
PIF_VALUE: 0
PIF_VALUE: 16
PIF_VALUE: 11
PIF_VALUE: 13
PIF_VALUE: 10
PIF_VALUE: 10
PIF_VALUE: 11
PIF_VALUE: 13
PIF_VALUE: 12
PIF_VALUE: 13

## 2019-11-06 ASSESSMENT — PAIN DESCRIPTION - PAIN TYPE: TYPE: SURGICAL PAIN

## 2019-11-06 ASSESSMENT — PAIN DESCRIPTION - ORIENTATION: ORIENTATION: LEFT

## 2019-11-06 ASSESSMENT — PAIN SCALES - GENERAL
PAINLEVEL_OUTOF10: 6
PAINLEVEL_OUTOF10: 0
PAINLEVEL_OUTOF10: 0
PAINLEVEL_OUTOF10: 5
PAINLEVEL_OUTOF10: 0
PAINLEVEL_OUTOF10: 0
PAINLEVEL_OUTOF10: 5
PAINLEVEL_OUTOF10: 0
PAINLEVEL_OUTOF10: 6

## 2019-11-06 ASSESSMENT — PAIN DESCRIPTION - LOCATION: LOCATION: KNEE

## 2019-11-06 ASSESSMENT — PAIN - FUNCTIONAL ASSESSMENT: PAIN_FUNCTIONAL_ASSESSMENT: 0-10

## 2019-11-07 ENCOUNTER — TELEPHONE (OUTPATIENT)
Dept: ORTHOPEDIC SURGERY | Age: 64
End: 2019-11-07

## 2019-11-07 VITALS
OXYGEN SATURATION: 92 % | WEIGHT: 165 LBS | BODY MASS INDEX: 31.15 KG/M2 | TEMPERATURE: 98.2 F | SYSTOLIC BLOOD PRESSURE: 117 MMHG | DIASTOLIC BLOOD PRESSURE: 69 MMHG | RESPIRATION RATE: 16 BRPM | HEART RATE: 68 BPM | HEIGHT: 61 IN

## 2019-11-07 PROBLEM — M19.90 OSTEOARTHRITIS: Status: ACTIVE | Noted: 2019-11-07

## 2019-11-07 LAB
HCT VFR BLD CALC: 33.7 % (ref 36–48)
HEMOGLOBIN: 11.4 G/DL (ref 12–16)
INR BLD: 1.78 (ref 0.86–1.14)
PROTHROMBIN TIME: 20.3 SEC (ref 9.8–13)

## 2019-11-07 PROCEDURE — 94799 UNLISTED PULMONARY SVC/PX: CPT

## 2019-11-07 PROCEDURE — G0378 HOSPITAL OBSERVATION PER HR: HCPCS

## 2019-11-07 PROCEDURE — 97530 THERAPEUTIC ACTIVITIES: CPT

## 2019-11-07 PROCEDURE — 94150 VITAL CAPACITY TEST: CPT

## 2019-11-07 PROCEDURE — 97161 PT EVAL LOW COMPLEX 20 MIN: CPT

## 2019-11-07 PROCEDURE — 97165 OT EVAL LOW COMPLEX 30 MIN: CPT

## 2019-11-07 PROCEDURE — 6360000002 HC RX W HCPCS: Performed by: ORTHOPAEDIC SURGERY

## 2019-11-07 PROCEDURE — 97116 GAIT TRAINING THERAPY: CPT

## 2019-11-07 PROCEDURE — 96374 THER/PROPH/DIAG INJ IV PUSH: CPT

## 2019-11-07 PROCEDURE — 97535 SELF CARE MNGMENT TRAINING: CPT

## 2019-11-07 PROCEDURE — 97110 THERAPEUTIC EXERCISES: CPT

## 2019-11-07 PROCEDURE — 85610 PROTHROMBIN TIME: CPT

## 2019-11-07 PROCEDURE — 36415 COLL VENOUS BLD VENIPUNCTURE: CPT

## 2019-11-07 PROCEDURE — 2580000003 HC RX 258: Performed by: ORTHOPAEDIC SURGERY

## 2019-11-07 PROCEDURE — 85018 HEMOGLOBIN: CPT

## 2019-11-07 PROCEDURE — 6370000000 HC RX 637 (ALT 250 FOR IP): Performed by: ORTHOPAEDIC SURGERY

## 2019-11-07 PROCEDURE — 85014 HEMATOCRIT: CPT

## 2019-11-07 RX ORDER — WARFARIN SODIUM 2.5 MG/1
TABLET ORAL
Qty: 90 TABLET | Refills: 0 | Status: SHIPPED | OUTPATIENT
Start: 2019-11-07 | End: 2020-07-16 | Stop reason: CLARIF

## 2019-11-07 RX ORDER — OXYCODONE HYDROCHLORIDE AND ACETAMINOPHEN 5; 325 MG/1; MG/1
1 TABLET ORAL EVERY 6 HOURS PRN
Qty: 28 TABLET | Refills: 0 | Status: SHIPPED | OUTPATIENT
Start: 2019-11-07 | End: 2019-11-14

## 2019-11-07 RX ADMIN — LEVOTHYROXINE SODIUM 100 MCG: 100 TABLET ORAL at 09:28

## 2019-11-07 RX ADMIN — DOCUSATE SODIUM 100 MG: 100 CAPSULE, LIQUID FILLED ORAL at 09:29

## 2019-11-07 RX ADMIN — OXYCODONE HYDROCHLORIDE 5 MG: 5 TABLET ORAL at 14:49

## 2019-11-07 RX ADMIN — DEXAMETHASONE SODIUM PHOSPHATE 3 MG: 4 INJECTION, SOLUTION INTRAMUSCULAR; INTRAVENOUS at 11:14

## 2019-11-07 RX ADMIN — Medication 10 ML: at 09:32

## 2019-11-07 RX ADMIN — OXYCODONE HYDROCHLORIDE 5 MG: 5 TABLET ORAL at 05:22

## 2019-11-07 RX ADMIN — AMLODIPINE BESYLATE 5 MG: 5 TABLET ORAL at 09:29

## 2019-11-07 RX ADMIN — ACETAMINOPHEN 650 MG: 325 TABLET ORAL at 05:22

## 2019-11-07 RX ADMIN — SENNOSIDES-DOCUSATE SODIUM TAB 8.6-50 MG 1 TABLET: 8.6-5 TAB at 09:29

## 2019-11-07 ASSESSMENT — PAIN DESCRIPTION - FREQUENCY: FREQUENCY: INTERMITTENT

## 2019-11-07 ASSESSMENT — PAIN DESCRIPTION - ONSET: ONSET: ON-GOING

## 2019-11-07 ASSESSMENT — PAIN DESCRIPTION - ORIENTATION: ORIENTATION: LEFT;POSTERIOR

## 2019-11-07 ASSESSMENT — PAIN SCALES - GENERAL
PAINLEVEL_OUTOF10: 5
PAINLEVEL_OUTOF10: 0
PAINLEVEL_OUTOF10: 0
PAINLEVEL_OUTOF10: 4

## 2019-11-07 ASSESSMENT — PAIN DESCRIPTION - PROGRESSION: CLINICAL_PROGRESSION: GRADUALLY WORSENING

## 2019-11-07 ASSESSMENT — PAIN DESCRIPTION - PAIN TYPE: TYPE: SURGICAL PAIN

## 2019-11-07 ASSESSMENT — PAIN DESCRIPTION - DESCRIPTORS: DESCRIPTORS: ACHING;DISCOMFORT

## 2019-11-07 ASSESSMENT — PAIN - FUNCTIONAL ASSESSMENT: PAIN_FUNCTIONAL_ASSESSMENT: ACTIVITIES ARE NOT PREVENTED

## 2019-11-07 ASSESSMENT — PAIN DESCRIPTION - LOCATION: LOCATION: KNEE

## 2019-11-08 ENCOUNTER — HOSPITAL ENCOUNTER (OUTPATIENT)
Dept: PHYSICAL THERAPY | Age: 64
Setting detail: THERAPIES SERIES
Discharge: HOME | End: 2019-11-08
Payer: COMMERCIAL

## 2019-11-08 PROCEDURE — 97140 MANUAL THERAPY 1/> REGIONS: CPT | Performed by: PHYSICAL THERAPIST

## 2019-11-08 PROCEDURE — 97162 PT EVAL MOD COMPLEX 30 MIN: CPT | Performed by: PHYSICAL THERAPIST

## 2019-11-08 PROCEDURE — 97110 THERAPEUTIC EXERCISES: CPT | Performed by: PHYSICAL THERAPIST

## 2019-11-08 PROCEDURE — 97530 THERAPEUTIC ACTIVITIES: CPT | Performed by: PHYSICAL THERAPIST

## 2019-11-08 PROCEDURE — 97016 VASOPNEUMATIC DEVICE THERAPY: CPT | Performed by: PHYSICAL THERAPIST

## 2019-11-11 ENCOUNTER — HOSPITAL ENCOUNTER (OUTPATIENT)
Dept: PHYSICAL THERAPY | Age: 64
Setting detail: THERAPIES SERIES
Discharge: HOME | End: 2019-11-11
Payer: COMMERCIAL

## 2019-11-11 LAB
INR BLD: 1.8 (ref 0.8–1.2)
PROTHROMBIN TIME: 21.6 SECONDS (ref 11.7–14.2)

## 2019-11-11 PROCEDURE — 97140 MANUAL THERAPY 1/> REGIONS: CPT | Performed by: PHYSICAL THERAPIST

## 2019-11-11 PROCEDURE — 97530 THERAPEUTIC ACTIVITIES: CPT | Performed by: PHYSICAL THERAPIST

## 2019-11-11 PROCEDURE — 97110 THERAPEUTIC EXERCISES: CPT | Performed by: PHYSICAL THERAPIST

## 2019-11-11 PROCEDURE — G0283 ELEC STIM OTHER THAN WOUND: HCPCS | Performed by: PHYSICAL THERAPIST

## 2019-11-12 ENCOUNTER — OFFICE VISIT (OUTPATIENT)
Dept: INTERNAL MEDICINE CLINIC | Age: 64
End: 2019-11-12
Payer: COMMERCIAL

## 2019-11-12 VITALS
OXYGEN SATURATION: 98 % | DIASTOLIC BLOOD PRESSURE: 78 MMHG | SYSTOLIC BLOOD PRESSURE: 116 MMHG | HEART RATE: 70 BPM | RESPIRATION RATE: 14 BRPM

## 2019-11-12 DIAGNOSIS — E03.9 ACQUIRED HYPOTHYROIDISM: Primary | ICD-10-CM

## 2019-11-12 DIAGNOSIS — L40.9 PSORIASIS OF SCALP: ICD-10-CM

## 2019-11-12 DIAGNOSIS — E78.2 MIXED HYPERLIPIDEMIA: ICD-10-CM

## 2019-11-12 DIAGNOSIS — I82.4Z1 ACUTE DEEP VEIN THROMBOSIS (DVT) OF DISTAL END OF RIGHT LOWER EXTREMITY (HCC): ICD-10-CM

## 2019-11-12 DIAGNOSIS — I10 ESSENTIAL HYPERTENSION: ICD-10-CM

## 2019-11-12 DIAGNOSIS — R73.01 ELEVATED FASTING GLUCOSE: ICD-10-CM

## 2019-11-12 DIAGNOSIS — L23.9 ALLERGIC CONTACT DERMATITIS, UNSPECIFIED TRIGGER: ICD-10-CM

## 2019-11-12 PROCEDURE — 99214 OFFICE O/P EST MOD 30 MIN: CPT | Performed by: INTERNAL MEDICINE

## 2019-11-12 RX ORDER — TRIAMCINOLONE ACETONIDE 5 MG/G
OINTMENT TOPICAL
Qty: 45 G | Refills: 0 | Status: SHIPPED | OUTPATIENT
Start: 2019-11-12 | End: 2019-11-19

## 2019-11-12 ASSESSMENT — ENCOUNTER SYMPTOMS
NAUSEA: 0
SINUS PAIN: 0
BACK PAIN: 0
SHORTNESS OF BREATH: 0
EYE PAIN: 0
COUGH: 0
PHOTOPHOBIA: 0
ABDOMINAL PAIN: 0
COLOR CHANGE: 0
VOMITING: 0
WHEEZING: 0
SINUS PRESSURE: 0
DIARRHEA: 0
TROUBLE SWALLOWING: 0
CONSTIPATION: 0

## 2019-11-14 ENCOUNTER — HOSPITAL ENCOUNTER (OUTPATIENT)
Dept: PHYSICAL THERAPY | Age: 64
Setting detail: THERAPIES SERIES
Discharge: HOME | End: 2019-11-14
Payer: COMMERCIAL

## 2019-11-14 ENCOUNTER — APPOINTMENT (OUTPATIENT)
Dept: PHYSICAL THERAPY | Age: 64
End: 2019-11-14
Payer: COMMERCIAL

## 2019-11-14 DIAGNOSIS — E03.9 ACQUIRED HYPOTHYROIDISM: ICD-10-CM

## 2019-11-14 LAB
INR BLD: 2.4 (ref 0.8–1.2)
PROTHROMBIN TIME: 26.6 SECONDS (ref 11.7–14.2)

## 2019-11-14 PROCEDURE — G0283 ELEC STIM OTHER THAN WOUND: HCPCS | Performed by: PHYSICAL THERAPIST

## 2019-11-14 PROCEDURE — 97530 THERAPEUTIC ACTIVITIES: CPT | Performed by: PHYSICAL THERAPIST

## 2019-11-14 PROCEDURE — 97110 THERAPEUTIC EXERCISES: CPT | Performed by: PHYSICAL THERAPIST

## 2019-11-14 PROCEDURE — 97140 MANUAL THERAPY 1/> REGIONS: CPT | Performed by: PHYSICAL THERAPIST

## 2019-11-14 RX ORDER — LEVOTHYROXINE SODIUM 112 UG/1
TABLET ORAL
Qty: 90 TABLET | Refills: 1 | Status: SHIPPED | OUTPATIENT
Start: 2019-11-14 | End: 2020-05-06

## 2019-11-18 ENCOUNTER — HOSPITAL ENCOUNTER (OUTPATIENT)
Dept: PHYSICAL THERAPY | Age: 64
Setting detail: THERAPIES SERIES
Discharge: HOME | End: 2019-11-18
Payer: COMMERCIAL

## 2019-11-18 PROCEDURE — 97110 THERAPEUTIC EXERCISES: CPT | Performed by: PHYSICAL THERAPY ASSISTANT

## 2019-11-18 PROCEDURE — 97140 MANUAL THERAPY 1/> REGIONS: CPT | Performed by: PHYSICAL THERAPY ASSISTANT

## 2019-11-18 PROCEDURE — 97016 VASOPNEUMATIC DEVICE THERAPY: CPT | Performed by: PHYSICAL THERAPY ASSISTANT

## 2019-11-18 PROCEDURE — G0283 ELEC STIM OTHER THAN WOUND: HCPCS | Performed by: PHYSICAL THERAPY ASSISTANT

## 2019-11-18 PROCEDURE — 97530 THERAPEUTIC ACTIVITIES: CPT | Performed by: PHYSICAL THERAPY ASSISTANT

## 2019-11-21 ENCOUNTER — HOSPITAL ENCOUNTER (OUTPATIENT)
Dept: PHYSICAL THERAPY | Age: 64
Setting detail: THERAPIES SERIES
Discharge: HOME | End: 2019-11-21
Payer: COMMERCIAL

## 2019-11-21 PROCEDURE — 97110 THERAPEUTIC EXERCISES: CPT | Performed by: PHYSICAL THERAPY ASSISTANT

## 2019-11-21 PROCEDURE — 97016 VASOPNEUMATIC DEVICE THERAPY: CPT | Performed by: PHYSICAL THERAPY ASSISTANT

## 2019-11-21 PROCEDURE — G0283 ELEC STIM OTHER THAN WOUND: HCPCS | Performed by: PHYSICAL THERAPY ASSISTANT

## 2019-11-21 PROCEDURE — 97530 THERAPEUTIC ACTIVITIES: CPT | Performed by: PHYSICAL THERAPY ASSISTANT

## 2019-11-21 PROCEDURE — 97140 MANUAL THERAPY 1/> REGIONS: CPT | Performed by: PHYSICAL THERAPY ASSISTANT

## 2019-11-22 PROBLEM — Z01.818 PREOP EXAMINATION: Status: RESOLVED | Noted: 2019-10-23 | Resolved: 2019-11-22

## 2019-11-26 ENCOUNTER — HOSPITAL ENCOUNTER (OUTPATIENT)
Dept: PHYSICAL THERAPY | Age: 64
Setting detail: THERAPIES SERIES
Discharge: HOME | End: 2019-11-26
Payer: COMMERCIAL

## 2019-11-26 PROCEDURE — G0283 ELEC STIM OTHER THAN WOUND: HCPCS | Performed by: PHYSICAL THERAPIST

## 2019-11-26 PROCEDURE — 97110 THERAPEUTIC EXERCISES: CPT | Performed by: PHYSICAL THERAPIST

## 2019-11-26 PROCEDURE — 97140 MANUAL THERAPY 1/> REGIONS: CPT | Performed by: PHYSICAL THERAPIST

## 2019-11-26 PROCEDURE — 97530 THERAPEUTIC ACTIVITIES: CPT | Performed by: PHYSICAL THERAPIST

## 2019-11-27 ENCOUNTER — OFFICE VISIT (OUTPATIENT)
Dept: CARDIOLOGY CLINIC | Age: 64
End: 2019-11-27
Payer: COMMERCIAL

## 2019-11-27 VITALS
HEART RATE: 60 BPM | WEIGHT: 165 LBS | BODY MASS INDEX: 31.18 KG/M2 | SYSTOLIC BLOOD PRESSURE: 122 MMHG | DIASTOLIC BLOOD PRESSURE: 80 MMHG

## 2019-11-27 DIAGNOSIS — E78.2 MIXED HYPERLIPIDEMIA: Primary | ICD-10-CM

## 2019-11-27 DIAGNOSIS — I36.1 NON-RHEUMATIC TRICUSPID VALVE INSUFFICIENCY: ICD-10-CM

## 2019-11-27 DIAGNOSIS — R00.1 BRADYCARDIA: ICD-10-CM

## 2019-11-27 DIAGNOSIS — I35.1 NONRHEUMATIC AORTIC VALVE INSUFFICIENCY: ICD-10-CM

## 2019-11-27 PROCEDURE — 99214 OFFICE O/P EST MOD 30 MIN: CPT | Performed by: INTERNAL MEDICINE

## 2019-11-27 RX ORDER — ATORVASTATIN CALCIUM 20 MG/1
20 TABLET, FILM COATED ORAL DAILY
Qty: 60 TABLET | Refills: 3 | Status: SHIPPED | OUTPATIENT
Start: 2019-11-27 | End: 2020-05-06

## 2019-11-29 ENCOUNTER — HOSPITAL ENCOUNTER (OUTPATIENT)
Dept: PHYSICAL THERAPY | Age: 64
Setting detail: THERAPIES SERIES
Discharge: HOME | End: 2019-11-29
Payer: COMMERCIAL

## 2019-11-29 PROCEDURE — 97140 MANUAL THERAPY 1/> REGIONS: CPT

## 2019-11-29 PROCEDURE — G0283 ELEC STIM OTHER THAN WOUND: HCPCS

## 2019-11-29 PROCEDURE — 97110 THERAPEUTIC EXERCISES: CPT

## 2019-11-29 PROCEDURE — 97530 THERAPEUTIC ACTIVITIES: CPT

## 2019-12-01 DIAGNOSIS — E03.9 ACQUIRED HYPOTHYROIDISM: ICD-10-CM

## 2019-12-02 DIAGNOSIS — I10 ESSENTIAL HYPERTENSION: ICD-10-CM

## 2019-12-02 RX ORDER — LEVOTHYROXINE SODIUM 0.1 MG/1
TABLET ORAL
Qty: 30 TABLET | Refills: 1 | OUTPATIENT
Start: 2019-12-02

## 2019-12-03 ENCOUNTER — HOSPITAL ENCOUNTER (OUTPATIENT)
Dept: PHYSICAL THERAPY | Age: 64
Setting detail: THERAPIES SERIES
Discharge: HOME | End: 2019-12-03
Payer: COMMERCIAL

## 2019-12-03 PROCEDURE — G0283 ELEC STIM OTHER THAN WOUND: HCPCS | Performed by: PHYSICAL THERAPY ASSISTANT

## 2019-12-03 PROCEDURE — 97140 MANUAL THERAPY 1/> REGIONS: CPT | Performed by: PHYSICAL THERAPY ASSISTANT

## 2019-12-03 PROCEDURE — 97530 THERAPEUTIC ACTIVITIES: CPT | Performed by: PHYSICAL THERAPY ASSISTANT

## 2019-12-03 PROCEDURE — 97110 THERAPEUTIC EXERCISES: CPT | Performed by: PHYSICAL THERAPY ASSISTANT

## 2019-12-03 RX ORDER — LISINOPRIL AND HYDROCHLOROTHIAZIDE 12.5; 1 MG/1; MG/1
TABLET ORAL
Qty: 30 TABLET | Refills: 5 | Status: SHIPPED | OUTPATIENT
Start: 2019-12-03 | End: 2020-05-06

## 2019-12-05 ENCOUNTER — HOSPITAL ENCOUNTER (OUTPATIENT)
Dept: PHYSICAL THERAPY | Age: 64
Setting detail: THERAPIES SERIES
Discharge: HOME | End: 2019-12-05
Payer: COMMERCIAL

## 2019-12-05 ENCOUNTER — OFFICE VISIT (OUTPATIENT)
Dept: ORTHOPEDIC SURGERY | Age: 64
End: 2019-12-05

## 2019-12-05 VITALS
HEART RATE: 53 BPM | SYSTOLIC BLOOD PRESSURE: 133 MMHG | HEIGHT: 61 IN | BODY MASS INDEX: 31.13 KG/M2 | DIASTOLIC BLOOD PRESSURE: 76 MMHG | WEIGHT: 164.9 LBS

## 2019-12-05 DIAGNOSIS — Z96.652 S/P TKR (TOTAL KNEE REPLACEMENT), LEFT: ICD-10-CM

## 2019-12-05 DIAGNOSIS — M25.561 RIGHT KNEE PAIN, UNSPECIFIED CHRONICITY: Primary | ICD-10-CM

## 2019-12-05 PROCEDURE — 97530 THERAPEUTIC ACTIVITIES: CPT | Performed by: PHYSICAL THERAPIST

## 2019-12-05 PROCEDURE — 97110 THERAPEUTIC EXERCISES: CPT | Performed by: PHYSICAL THERAPIST

## 2019-12-05 PROCEDURE — G0283 ELEC STIM OTHER THAN WOUND: HCPCS | Performed by: PHYSICAL THERAPIST

## 2019-12-05 PROCEDURE — 99024 POSTOP FOLLOW-UP VISIT: CPT | Performed by: ORTHOPAEDIC SURGERY

## 2019-12-10 ENCOUNTER — HOSPITAL ENCOUNTER (OUTPATIENT)
Dept: PHYSICAL THERAPY | Age: 64
Setting detail: THERAPIES SERIES
Discharge: HOME | End: 2019-12-10
Payer: COMMERCIAL

## 2019-12-10 PROCEDURE — G0283 ELEC STIM OTHER THAN WOUND: HCPCS | Performed by: PHYSICAL THERAPY ASSISTANT

## 2019-12-10 PROCEDURE — 97112 NEUROMUSCULAR REEDUCATION: CPT | Performed by: PHYSICAL THERAPY ASSISTANT

## 2019-12-10 PROCEDURE — 97530 THERAPEUTIC ACTIVITIES: CPT | Performed by: PHYSICAL THERAPY ASSISTANT

## 2019-12-10 PROCEDURE — 97110 THERAPEUTIC EXERCISES: CPT | Performed by: PHYSICAL THERAPY ASSISTANT

## 2019-12-10 PROCEDURE — 97140 MANUAL THERAPY 1/> REGIONS: CPT | Performed by: PHYSICAL THERAPY ASSISTANT

## 2019-12-12 ENCOUNTER — HOSPITAL ENCOUNTER (OUTPATIENT)
Dept: PHYSICAL THERAPY | Age: 64
Setting detail: THERAPIES SERIES
Discharge: HOME | End: 2019-12-12
Payer: COMMERCIAL

## 2019-12-12 PROCEDURE — 97112 NEUROMUSCULAR REEDUCATION: CPT | Performed by: PHYSICAL THERAPY ASSISTANT

## 2019-12-12 PROCEDURE — 97110 THERAPEUTIC EXERCISES: CPT | Performed by: PHYSICAL THERAPY ASSISTANT

## 2019-12-12 PROCEDURE — G0283 ELEC STIM OTHER THAN WOUND: HCPCS | Performed by: PHYSICAL THERAPY ASSISTANT

## 2019-12-12 PROCEDURE — 97140 MANUAL THERAPY 1/> REGIONS: CPT | Performed by: PHYSICAL THERAPY ASSISTANT

## 2019-12-12 PROCEDURE — 97530 THERAPEUTIC ACTIVITIES: CPT | Performed by: PHYSICAL THERAPY ASSISTANT

## 2019-12-17 ENCOUNTER — HOSPITAL ENCOUNTER (OUTPATIENT)
Dept: PHYSICAL THERAPY | Age: 64
Setting detail: THERAPIES SERIES
Discharge: HOME | End: 2019-12-17
Payer: COMMERCIAL

## 2019-12-17 PROCEDURE — G0283 ELEC STIM OTHER THAN WOUND: HCPCS | Performed by: PHYSICAL THERAPIST

## 2019-12-17 PROCEDURE — 97112 NEUROMUSCULAR REEDUCATION: CPT | Performed by: PHYSICAL THERAPIST

## 2019-12-17 PROCEDURE — 97530 THERAPEUTIC ACTIVITIES: CPT | Performed by: PHYSICAL THERAPIST

## 2019-12-17 PROCEDURE — 97110 THERAPEUTIC EXERCISES: CPT | Performed by: PHYSICAL THERAPIST

## 2019-12-17 PROCEDURE — 97140 MANUAL THERAPY 1/> REGIONS: CPT | Performed by: PHYSICAL THERAPIST

## 2019-12-20 ENCOUNTER — HOSPITAL ENCOUNTER (OUTPATIENT)
Dept: PHYSICAL THERAPY | Age: 64
Setting detail: THERAPIES SERIES
Discharge: HOME | End: 2019-12-20
Payer: COMMERCIAL

## 2019-12-20 PROCEDURE — 97530 THERAPEUTIC ACTIVITIES: CPT | Performed by: PHYSICAL THERAPIST

## 2019-12-20 PROCEDURE — G0283 ELEC STIM OTHER THAN WOUND: HCPCS | Performed by: PHYSICAL THERAPIST

## 2019-12-20 PROCEDURE — 97140 MANUAL THERAPY 1/> REGIONS: CPT | Performed by: PHYSICAL THERAPIST

## 2019-12-20 PROCEDURE — 97112 NEUROMUSCULAR REEDUCATION: CPT | Performed by: PHYSICAL THERAPIST

## 2019-12-20 PROCEDURE — 97110 THERAPEUTIC EXERCISES: CPT | Performed by: PHYSICAL THERAPIST

## 2019-12-23 DIAGNOSIS — I10 ESSENTIAL HYPERTENSION: ICD-10-CM

## 2019-12-23 RX ORDER — AMLODIPINE BESYLATE 5 MG/1
TABLET ORAL
Qty: 30 TABLET | Refills: 5 | Status: SHIPPED | OUTPATIENT
Start: 2019-12-23 | End: 2020-05-06

## 2019-12-26 ENCOUNTER — HOSPITAL ENCOUNTER (OUTPATIENT)
Dept: PHYSICAL THERAPY | Age: 64
Setting detail: THERAPIES SERIES
Discharge: HOME | End: 2019-12-26
Payer: COMMERCIAL

## 2019-12-26 PROCEDURE — 97110 THERAPEUTIC EXERCISES: CPT | Performed by: PHYSICAL THERAPY ASSISTANT

## 2019-12-26 PROCEDURE — G0283 ELEC STIM OTHER THAN WOUND: HCPCS | Performed by: PHYSICAL THERAPY ASSISTANT

## 2019-12-26 PROCEDURE — 97530 THERAPEUTIC ACTIVITIES: CPT | Performed by: PHYSICAL THERAPY ASSISTANT

## 2019-12-26 PROCEDURE — 97112 NEUROMUSCULAR REEDUCATION: CPT | Performed by: PHYSICAL THERAPY ASSISTANT

## 2019-12-31 ENCOUNTER — HOSPITAL ENCOUNTER (OUTPATIENT)
Dept: PHYSICAL THERAPY | Age: 64
Setting detail: THERAPIES SERIES
Discharge: HOME | End: 2019-12-31
Payer: COMMERCIAL

## 2019-12-31 PROCEDURE — 97530 THERAPEUTIC ACTIVITIES: CPT | Performed by: PHYSICAL THERAPY ASSISTANT

## 2019-12-31 PROCEDURE — 97110 THERAPEUTIC EXERCISES: CPT | Performed by: PHYSICAL THERAPY ASSISTANT

## 2019-12-31 PROCEDURE — 97112 NEUROMUSCULAR REEDUCATION: CPT | Performed by: PHYSICAL THERAPY ASSISTANT

## 2019-12-31 NOTE — FLOWSHEET NOTE
and did well but did have moderate muscle soreness after. Still uses cane for certain instances (ex. Stairs/declines in parking lots) for safety/stability    (+) giving way/ insidious; partial; infrequent  (+/-) night pain/ difficulty sleeping (no longer than 3-4 hours; unsure if the issue is the knee)  (-) popping  (-)catching/locking  (+/-) swelling  (+) stiffness/ sitting  (+) stairs  (NO) kneeling/squatting      OBJECTIVE:       LEFS Score: 8/ 80= 10% (11/8/19; Postop)  LEFS score: 33/80=41.25% (58.75% deficit) 12/5/19 12-20-19  ROM PROM AROM Overpressure Comment     L R L R L R     Flexion 133 122         ERMI   Extension -2 -2  0      QS                                          12-17-19  Strength L R Comment   Quad 3/ 5 4+/ 5 ANABEL 0°   Hamstring         Gastroc         Hip  flexion         Hip abd                             **Difficulty with active KEXT and supine SLR     12-17-19  Special Test Results/Comment   Homans (-)   Temperature (-)      12-20-19  Girth L R   Mid Patella 43.5 42.4   Suprapatellar 44.9 44.8   5cm above 46.9 46.8   15cm above          Reflexes/Sensation:               []?Dermatomes/Myotomes intact               []?Reflexes equal and normal bilaterally              [x]? Other: NT     Joint mobility:               [x]? Normal                       []?Hypo              []?Hyper     Palpation: Generalized due to PO pain and swelling     Functional Mobility/Transfers: Independent     Posture: Neutral PO valgus; L knee flexed posture     Bandages/Dressings/Incisions:  SUPRIYA hose intact     Gait: Single point cane (PRN);  WNL     Orthopedic Special Tests:        RESTRICTIONS/PRECAUTIONS: PO L knee TKR with femoral nerve block (fall prevention discussion)    Exercises/Interventions:   Exercise/Equipment Resistance/Repetitions Other comments 12/31/2019   Stretching      Hamstring 30\"x 5 Propped; 10# x   Hip Flexion      ITB      Grion      Quad      Inclined Calf 30\"x 5  x   Towel Pull 30\"x 5 Propped; 10#          SLR      Supine 3x10 With EMS    Prone      Abduction 3x10     Adducton 2x 10\"x 10 PS; flexed    SLR+            Isometrics      Quad sets 2x 10\"x 10 A/S; 10#  NMES for NMR x         Patellar Glides 5'     Medial   x   Superior   x   Inferior   x         ROM      Passive 5x60' ERMI x   Active 10x KEXT/ FLEX x   Weight Shift      Hang Weights 10' Propped; 10# x   Sheet Pulls      Ankle Pumps 5' Hourly          CKC      Calf raises 3x10 standing x   Wall sits      Step ups 2x10 FSU/over 6'' (up with L, down with R) x   1 leg stand      Squatting 3x10 Sit<>stand 1 aeromat x   CC TKE 3x 10 silver x   Balance 4' RC L9 x         PRE      Extension 3x10 RANGE: 90-0, 15# x   Flexion 3x 10 RANGE: 0-90; 30-25# x         Cable Column            Leg Press 3x10 RANGE: 80-10, 80# x         Bike 10' REC, ROM, seat 6, R 1.5-3.5 x   Treadmill 5' gait  2.3 mph x   Band walking 3 laps Red, F/S x     Other Therapeutic Activities:   Single point cane PRN: FWBAT  SUPRIYA hose  Cryotherapy    Home Exercise Program:   See above and attached. Initial HEP discussed and completed. Full written, verbal, and demonstration provided. Patient Education:   Full postop instructions provided. Written and verbal guidelines provided for but not limited to: DME/ HEP/ ICE/ gait/ general medical instructions. Discussion regarding knee extension OP program for home: frequency/ duration/ intensity. Discussion for relaxation cues. Manual Treatments:   Patellar mobs/ STM    5'        Therapeutic Exercise and NMR EXR  [x] (27729) Provided verbal/tactile cueing for activities related to strengthening, flexibility, endurance, ROM for improvements in LE, proximal hip, and core control with self care, mobility, lifting, ambulation.   [x] (75859) Provided verbal/tactile cueing for activities related to improving balance, coordination, kinesthetic sense, posture, motor skill, proprioception  to assist with LE, proximal hip, and core control in self care, mobility, lifting, ambulation and eccentric single leg control. NMR and Therapeutic Activities:    [x] (42259 or 65977) Provided verbal/tactile cueing for activities related to improving balance, coordination, kinesthetic sense, posture, motor skill, proprioception and motor activation to allow for proper function of core, proximal hip and LE with self care and ADLs  [x] (90398) Gait Re-education- Provided training and instruction to the patient for proper LE, core and proximal hip recruitment and positioning and eccentric body weight control with ambulation re-education including up and down stairs     Home Exercise Program:    [x] (15680) Reviewed/Progressed HEP activities related to strengthening, flexibility, endurance, ROM of core, proximal hip and LE for functional self-care, mobility, lifting and ambulation/stair navigation   [x] (98804)Reviewed/Progressed HEP activities related to improving balance, coordination, kinesthetic sense, posture, motor skill, proprioception of core, proximal hip and LE for self care, mobility, lifting, and ambulation/stair navigation      Manual Treatments:  PROM / STM / Oscillations-Mobs:  G-I, II, III, IV (PA's, Inf., Post.)  [x] (95613) Provided manual therapy to mobilize LE, proximal hip and/or LS spine soft tissue/joints for the purpose of modulating pain, promoting relaxation,  increasing ROM, reducing/eliminating soft tissue swelling/inflammation/restriction, improving soft tissue extensibility and allowing for proper ROM for normal function with self care, mobility, lifting and ambulation.      Modalities:    [x] hot packs post knee  [] EMS   [] Ultrasound  [] ice     [] vasopneumatic  [] high volt/EGS  [] phono    [] tens    [] ionto  [] autorange/biodex   [] Interferential  [x] other: CP    Charges:  Timed Code Treatment Minutes: 54'   Total Treatment Minutes: 70'   815-10-12:40    [] EVAL: L2  [x] MR(87738) x  2   [] IONTO   [x] NMR (29116) x  1 Limitations (from functional questionnaire and intake)              [x]? Reduced ability to tolerate prolonged functional positions              [x]? Reduced ability or difficulty with changes of positions or transfers between positions              [x]? Reduced ability to maintain good posture and demonstrate good body mechanics with sitting, bending, and lifting              [x]? Reduced ability to sleep              [x]? Reduced ability or tolerance with driving and/or computer work              [x]? Reduced ability to perform lifting, carrying tasks              [x]? Reduced ability to squat              [x]? Reduced ability to forward bend              [x]? Reduced ability to ambulate prolonged functional periods/distances/surfaces              [x]? Reduced ability to ascend/descend stairs              [x]? Reduced ability to run, hop or jump              []?other:     Participation Restrictions              [x]? Reduced participation in self care activities              [x]? Reduced participation in home management activities              [x]? Reduced participation in work activities              [x]? Reduced participation in social activities. [x]? Reduced participation in sport activities.     Classification :               [x]? Signs/symptoms consistent with post-surgical status including decreased ROM, strength and function.              []?Signs/symptoms consistent with joint sprain/strain              []?Signs/symptoms consistent with patella-femoral syndrome              [x]? Signs/symptoms consistent with knee OA/hip OA              []?Signs/symptoms consistent with internal derangement of knee/Hip              [x]? Signs/symptoms consistent with functional hip weakness/NMR control                 []?Signs/symptoms consistent with tendinitis/tendinosis                      []?signs/symptoms consistent with pathology which may benefit from Dry needling                       []?other:       Prognosis/Rehab

## 2020-01-04 ENCOUNTER — PATIENT MESSAGE (OUTPATIENT)
Dept: INTERNAL MEDICINE CLINIC | Age: 65
End: 2020-01-04

## 2020-01-09 ENCOUNTER — OFFICE VISIT (OUTPATIENT)
Dept: ORTHOPEDIC SURGERY | Age: 65
End: 2020-01-09

## 2020-01-09 ENCOUNTER — HOSPITAL ENCOUNTER (OUTPATIENT)
Dept: PHYSICAL THERAPY | Age: 65
Setting detail: THERAPIES SERIES
Discharge: HOME | End: 2020-01-09
Payer: COMMERCIAL

## 2020-01-09 VITALS — HEIGHT: 61 IN | BODY MASS INDEX: 31.16 KG/M2 | WEIGHT: 165.01 LBS

## 2020-01-09 PROCEDURE — 99024 POSTOP FOLLOW-UP VISIT: CPT | Performed by: ORTHOPAEDIC SURGERY

## 2020-01-09 PROCEDURE — 97112 NEUROMUSCULAR REEDUCATION: CPT | Performed by: PHYSICAL THERAPY ASSISTANT

## 2020-01-09 PROCEDURE — 97110 THERAPEUTIC EXERCISES: CPT | Performed by: PHYSICAL THERAPY ASSISTANT

## 2020-01-09 PROCEDURE — 97530 THERAPEUTIC ACTIVITIES: CPT | Performed by: PHYSICAL THERAPY ASSISTANT

## 2020-01-09 NOTE — PROGRESS NOTES
Chief Complaint    Follow-up (right knee )      History of Present Illness:  Ricardo Kerr is a 59 y.o. female who presents for follow up of right knee(s). The patient is 6 month out of a right total knee arthroplasty on 7/17/19 and is 2 months out from a left total knee arthroplasty on 11/06/19. Over all the patient is doing very well post operatively. She has no pain and is happy with the results of surgery so far. She complains only of some mild stiffness and some balance trouble with descending stairs. She has been compliant with her post operative physical therapy and has achieved knee flexion of 122 degrees on the left. She has no questions or concerns today. Pain Assessment:  Location: right  Level: 0 out of 10  Duration: n/a  Description: stiff  Result of an injury: No  Work related injury: No  Aggravating actions: none  Relieving Factors: ice  Wants injections: No     Past Medical History:   Diagnosis Date    Anesthesia     \"heart stopped\" during ovarian surgery, abd insufflation. restarted on its own.  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    Endometrial polyp 12/2014    Hx of blood clots 2019    Hyperlipidemia 2/13/2013    Hypertension     pregnancy induced    Hypothyroidism     Lateral epicondylitis 7/1/2015    Plantar fasciitis of right foot 7/24/2013    Sleep apnea     uses CPAP    Sleep apnea         Past Surgical History:   Procedure Laterality Date    BREAST BIOPSY  1987    Left    FINE NEEDLE ASPIRATION  1983    Left Breast    MOUTH SURGERY  1990s    jaw surgery    OVARIAN CYST SURGERY Left 12-27-13    OVARIAN CYST SURGERY      7/2017    TONSILLECTOMY      TOTAL KNEE ARTHROPLASTY Right 7/17/2019    RIGHT TOTAL KNEE ARTHROPLASTY performed by Earnestine Roe MD at [address] [facility] Left 11/6/2019    LEFT TOTAL KNEE ARTHROPLASTY performed by Earnestine Roe MD at [facility] OR       Family History   Problem Relation Age of Onset    Coronary Art Dis Father    Rhonda Maradiaga sensory deficit. Knee Examination: Right    Skin:  There are no skin lesions, cellulitis, or extreme edema in the lower extremities. Sensation is grossly intact to light touch bilaterally lower extremity. The patient has warm and well-perfused Bilateral lower extremities with brisk capillary refill. Inspection:  The is no edema, no gross deformities, and no signs of infection. Palpation: There is no patellofemoral crepitus, there is no significant tenderness over the knee     Range of Motion:  -2 to 122  and grossly intact without pain and/or difficulty    Strength: Motor is grossly intact    Special Tests: There is no ligament instability. Gait: antalgic  without the use of assistive devices    Alignment: neutral    Radiology:     None      Office Procedures:  No orders of the defined types were placed in this encounter. Assessment: Lori Yang is a 59 y.o. female who presents for follow up of right knee(s). No diagnosis found. Patient's workup and evaluation were reviewed with the patient in the office today. Imaging was also reviewed with the patient in the clinic today. Given the patient's history and physical exam the patient is healing appropriately on the post opertaive treatment plan. Her mild stiffness should continue to improve with physical therapy and her home exercise program. We will continue to monitor the patient closely for her bilateral total knee arthroplasties to ensure a successful surgical result. Treatment Plan:    1. Activity modification and rest  2. Ice 20 minutes every 1-2 hours PRN  3. NSAIDs PRN  4. Elevation at least 2 inches above the heart with pillows supporting the joint underneath for swelling  5. Home exercises to maintain strength and range of motion  6.  Physical therapy including Manual Therapy, Strengthening, Stretching, Joint Mobilization, Gait Training, Neuromuscular Re-Education and Modalities    Diagnoses and treatments were reviewed with the patient today. Patient education material was provided. Questions were entertained and answered to the patient's satisfaction. Follow up: Physical Therapy      I, Deondre Desai ATC am scribing for and in the presence of Dr. Beryl Naranjo. The physical examination was performed by Dr. Beryl Naranjo. All counseling during the appointment was performed between the patient and Dr. Beryl Naranjo. 01/09/20 10:55 AM   Deondre Desai ATC This dictation was performed with a verbal recognition program (DRAGON) and it was checked for errors. It is possible that there are still dictated errors within this office note. If so, please bring any errors to my attention for an addendum. All efforts were made to ensure that this office note is accurate. Supervising Physician Attestation:  I, Dr. Beryl Naranjo, personally performed the services described in this documentation as scribed above, and it is both accurate and complete and I agree with all pertinent clinical information. I personally interviewed the patient and performed a physical examination and medical decision making. I discussed the patient's condition and treatment options and have  reviewed and agree with the past medical, family and social history unless otherwise noted. All of the patient's questions were answered.       Board Certified Orthopaedic Surgeon  [facility] and [address] and [address] [employer]  Professor of Vickie Barcenas

## 2020-01-09 NOTE — FLOWSHEET NOTE
The [address] [place] [address] [facility]      Physical Therapy Daily Treatment Note  Date:  2020    Patient Name:  Martina Armstrong    :  1955  MRN: [id number]  Restrictions/Precautions:    Medical/Treatment Diagnosis Information:  Diagnosis: M17.12 (ICD-10-CM) - Osteoarthritis of left knee, unspecified osteoarthritis type      Treatment Diagnosis: Knee pain/ swelling/ difficulty walking/ limited ROM/ LE muscle weakness/ LE balance deficit  S/P L knee TKR  DOS: 19  Current PO meds:  ASA 1 tab BID; Ibuprofen 2 tabs BID; Tylenol 2 tabs QD                                             Insurance information:  ANTHEM/ EFF 18/ IND ; MET 0/ IND OOP MAX 3600; MET 0/ CO-INS COV 80%/ OOP 20%/ 30 VPCY HARD LIMIT/ NO AUTH REQ/ ACE/ REF # [id number]/ PAG/ 20  Physician Information:  Didi Winkler MD  Plan of care signed (Y/N): Yes    Date of Patient follow up with Physician: Plan for PO MD/ PT visit on 19; the at 3/ 6/ 12 weeks PO  Patient seen in consultation with Dr. Ninfa Alexander who established the initial/subsequent treatment protocol. 19: ok to d/c coumadin and begin baby ASA BID 48 hours later, no need for Doppler, doing well at this point  19: 4 week check upstairs with xray    G-Code (if applicable):      Date G-Code Applied:  19   LEFS score: 33/80= 41.25%    Progress Note: [x]  Yes  []  No  Next due by: Visit #23       Latex Allergy:  [x]NO      []YES  Preferred Language for Healthcare:   [x]English       []other:    Visit # Insurance Allowable   16 (L) 30 vpcy     Pain Scale: 2-3 /10 @ rest                    7-8/ 10 @ worst  Easing factors: Rest; ice; meds  Provocative factors: Immediate PO pain and swelling; attempts at showering and entrance/ exit from shower stal  Type: [x]? Constant       []? Intermittent  []? Radiating     []? Localized     []? other:                   SUBJECTIVE: Doing well with gym program. Still with difficulty sit<>stand and stairs. No longer using cane but does require HR for stairs. Seeing MD today for 6 month check on R TKR.    (+) giving way/ insidious; partial; infrequent  (+/-) night pain/ difficulty sleeping (no longer than 3-4 hours; unsure if the issue is the knee)  (-) popping  (-)catching/locking  (+/-) swelling  (+) stiffness/ sitting  (+) stairs  (NO) kneeling/squatting      OBJECTIVE:       LEFS Score: 8/ 80= 10% (11/8/19; Postop)  LEFS score: 33/80=41.25% (58.75% deficit) 12/5/19  LEFS Score: 60/80=75% (1-9-20)     12-20-19  ROM PROM AROM Overpressure Comment     L R L R L R     Flexion 133 122         ERMI   Extension -2 -2  0      QS                                          12-17-19  Strength L R Comment   Quad 3/ 5 4+/ 5 ANABEL 0°   Hamstring         Gastroc         Hip  flexion         Hip abd                             **Difficulty with active KEXT and supine SLR     12-17-19  Special Test Results/Comment   Homans (-)   Temperature (-)      12-20-19  Girth L R   Mid Patella 43.5 42.4   Suprapatellar 44.9 44.8   5cm above 46.9 46.8   15cm above          Reflexes/Sensation:               []?Dermatomes/Myotomes intact               []?Reflexes equal and normal bilaterally              [x]? Other: NT     Joint mobility:               [x]? Normal                       []?Hypo              []?Hyper     Palpation: non tender     Functional Mobility/Transfers: Independent     Posture: Neutral      Bandages/Dressings/Incisions:       Gait: normal     Orthopedic Special Tests:        RESTRICTIONS/PRECAUTIONS: PO L knee TKR with femoral nerve block (fall prevention discussion)    Exercises/Interventions:   Exercise/Equipment Resistance/Repetitions Other comments 1/9/2020   Stretching      Hamstring 30\"x 5 Propped; 10# x   Hip Flexion      ITB      Grion      Quad      Inclined Calf 30\"x 5  x   Towel Pull 30\"x 5 Propped; 10#          SLR      Supine 3x10 With EMS    Prone      Abduction 3x10     Adducton 2x 10\"x 10 PS; flexed    SLR+            Isometrics      Quad sets 2x 10\"x 10 A/S; 10#  NMES for NMR          Patellar Glides 5'     Medial      Superior      Inferior            ROM      Passive 5x60' ERMI x   Active 10x KEXT/ FLEX x   Weight Shift      Hang Weights 10' Propped; 10# x   Sheet Pulls      Ankle Pumps 5' Hourly          CKC      Calf raises 3x10 standing x   Wall sits 3-5xfat  x   Step ups 3x10 FSU/over 6'' (up with L, down with R) x   1 leg stand      Squatting 3x10 Sit<>stand 1 aeromat x   CC TKE 3x 10 silver x   Balance 4' RC L8 x         PRE      Extension 3x10 RANGE: 90-0, 15# x   Flexion 3x 10 RANGE: 0-90; 30-25# x         Cable Column            Leg Press 3x10 RANGE: 80-10, 90# x         Bike 10' REC, ROM, seat 6, R 1.5-3.5 x   Treadmill 5' gait  2.3 mph    Band walking 3 laps Red, F/S x     Other Therapeutic Activities:   Single point cane PRN: FWBAT  SUPRIYA hose  Cryotherapy    Home Exercise Program:   See above and attached. Initial HEP discussed and completed. Full written, verbal, and demonstration provided. Patient Education:   Full postop instructions provided. Written and verbal guidelines provided for but not limited to: DME/ HEP/ ICE/ gait/ general medical instructions. Discussion regarding knee extension OP program for home: frequency/ duration/ intensity. Discussion for relaxation cues. Manual Treatments:           Therapeutic Exercise and NMR EXR  [x] (89440) Provided verbal/tactile cueing for activities related to strengthening, flexibility, endurance, ROM for improvements in LE, proximal hip, and core control with self care, mobility, lifting, ambulation. [x] (73456) Provided verbal/tactile cueing for activities related to improving balance, coordination, kinesthetic sense, posture, motor skill, proprioception  to assist with LE, proximal hip, and core control in self care, mobility, lifting, ambulation and eccentric single leg control.      NMR and Therapeutic Activities: [x] (67902 or 12803) Provided verbal/tactile cueing for activities related to improving balance, coordination, kinesthetic sense, posture, motor skill, proprioception and motor activation to allow for proper function of core, proximal hip and LE with self care and ADLs  [x] (63828) Gait Re-education- Provided training and instruction to the patient for proper LE, core and proximal hip recruitment and positioning and eccentric body weight control with ambulation re-education including up and down stairs     Home Exercise Program:    [x] (66694) Reviewed/Progressed HEP activities related to strengthening, flexibility, endurance, ROM of core, proximal hip and LE for functional self-care, mobility, lifting and ambulation/stair navigation   [x] (28178)Reviewed/Progressed HEP activities related to improving balance, coordination, kinesthetic sense, posture, motor skill, proprioception of core, proximal hip and LE for self care, mobility, lifting, and ambulation/stair navigation      Manual Treatments:  PROM / STM / Oscillations-Mobs:  G-I, II, III, IV (PA's, Inf., Post.)  [x] (94377) Provided manual therapy to mobilize LE, proximal hip and/or LS spine soft tissue/joints for the purpose of modulating pain, promoting relaxation,  increasing ROM, reducing/eliminating soft tissue swelling/inflammation/restriction, improving soft tissue extensibility and allowing for proper ROM for normal function with self care, mobility, lifting and ambulation.      Modalities:    [x] hot packs post knee  [] EMS   [] Ultrasound  [] ice     [] vasopneumatic  [] high volt/EGS  [] phono    [] tens    [] ionto  [] autorange/biodex   [] Interferential  [x] other: CP    Charges:  Timed Code Treatment Minutes: 54'   Total Treatment Minutes: 79'   900-10:15    [] EVAL: L2  [x] ZQ(67480) x  2   [] IONTO   [x] NMR (45420) x  1   [] VASO  [] Manual (97563) x       [] Other:  [x] TA x  1    [] Mech Traction (77306)  [] ES(attended) (14765)      [] ES (un) (53513):     GOALS:  Patient stated goal: Would like to resume her walking/ fitness program     Therapist goals for Patient:   Short Term Goals: To be achieved in: 2 weeks  1. Independent in HEP and progression per patient tolerance, in order to prevent re-injury. 2. Patient will have a decrease in pain to facilitate improvement in movement, function, and ADLs as indicated by Functional Deficits.     Long Term Goals: To be achieved in: 12 weeks PO  1. Disability index score of 40% or less for the LEFS to assist with reaching prior level of function. 2. Patient will demonstrate increased AROM to 0-125-130 deg to allow for proper joint functioning as indicated by patients Functional Deficits. 3. Patient will demonstrate an increase in Strength to good proximal hip strength and control in LE to allow for proper functional mobility as indicated by patients Functional Deficits. 4. Patient will return to 60%> functional activities without increased symptoms or restriction. 5. Patient will resume a fitness program using AHA guidelines and OA precautions including, but not limited to strength and NI cardio.                  Progression Towards Functional goals:  [] Patient is progressing as expected towards functional goals listed. [] Progression is slowed due to complexities listed. [] Progression has been slowed due to co-morbidities. [x] Plan just implemented, too soon to assess goals progression  [] Other:     ASSESSMENT:   Functional Impairments:                [x]? Noted lumbar/proximal hip/LE hypomobility              [x]? Decreased LE functional ROM              [x]? Decreased core/proximal hip strength and neuromuscular control              [x]? Decreased LE functional strength   [x]? Reduced balance/proprioceptive control              []?other:       Functional Activity Limitations (from functional questionnaire and intake)              [x]? Reduced ability to tolerate prolonged functional

## 2020-01-13 ENCOUNTER — OFFICE VISIT (OUTPATIENT)
Dept: PULMONOLOGY | Age: 65
End: 2020-01-13
Payer: COMMERCIAL

## 2020-01-13 VITALS
SYSTOLIC BLOOD PRESSURE: 130 MMHG | HEART RATE: 66 BPM | WEIGHT: 168 LBS | BODY MASS INDEX: 31.76 KG/M2 | OXYGEN SATURATION: 100 % | DIASTOLIC BLOOD PRESSURE: 78 MMHG

## 2020-01-13 PROBLEM — E66.811 CLASS 1 OBESITY WITHOUT SERIOUS COMORBIDITY WITH BODY MASS INDEX (BMI) OF 31.0 TO 31.9 IN ADULT: Status: ACTIVE | Noted: 2020-01-13

## 2020-01-13 PROBLEM — E66.9 CLASS 1 OBESITY WITHOUT SERIOUS COMORBIDITY WITH BODY MASS INDEX (BMI) OF 31.0 TO 31.9 IN ADULT: Status: ACTIVE | Noted: 2020-01-13

## 2020-01-13 PROCEDURE — 99214 OFFICE O/P EST MOD 30 MIN: CPT | Performed by: NURSE PRACTITIONER

## 2020-01-13 ASSESSMENT — SLEEP AND FATIGUE QUESTIONNAIRES
HOW LIKELY ARE YOU TO NOD OFF OR FALL ASLEEP WHILE SITTING INACTIVE IN A PUBLIC PLACE: 0
HOW LIKELY ARE YOU TO NOD OFF OR FALL ASLEEP WHILE SITTING QUIETLY AFTER LUNCH WITHOUT ALCOHOL: 2
HOW LIKELY ARE YOU TO NOD OFF OR FALL ASLEEP WHILE SITTING AND TALKING TO SOMEONE: 0
HOW LIKELY ARE YOU TO NOD OFF OR FALL ASLEEP WHEN YOU ARE A PASSENGER IN A CAR FOR AN HOUR WITHOUT A BREAK: 1
ESS TOTAL SCORE: 7
HOW LIKELY ARE YOU TO NOD OFF OR FALL ASLEEP WHILE WATCHING TV: 1
HOW LIKELY ARE YOU TO NOD OFF OR FALL ASLEEP IN A CAR, WHILE STOPPED FOR A FEW MINUTES IN TRAFFIC: 0
HOW LIKELY ARE YOU TO NOD OFF OR FALL ASLEEP WHILE LYING DOWN TO REST IN THE AFTERNOON WHEN CIRCUMSTANCES PERMIT: 2
HOW LIKELY ARE YOU TO NOD OFF OR FALL ASLEEP WHILE SITTING AND READING: 1

## 2020-01-13 ASSESSMENT — ENCOUNTER SYMPTOMS
EYE PAIN: 0
COUGH: 0
APNEA: 0
SHORTNESS OF BREATH: 0
RHINORRHEA: 0
ABDOMINAL DISTENTION: 0
SINUS PRESSURE: 0
ABDOMINAL PAIN: 0
EYE REDNESS: 0

## 2020-01-13 NOTE — PROGRESS NOTES
Ashli Hernández         : 1955    Diagnosis: [x] RAMANDEEP (G47.33) [] CSA (G47.31) [] Apnea (G47.30)   Length of Need: [x] 12 Months [] 99 Months [] Other:    Machine (ARLYN!): [x] Respironics Dream Station      Auto [] ResMed AirSense     Auto [] Other:     []  CPAP () [] Bilevel ()   Mode: [] Auto [] Spontaneous    Mode: [] Auto [] Spontaneous                            Comfort Settings:   - Ramp Pressure:  cmH2O                                        - Ramp time: 15 min                                     -  Flex/EPR - 3 full time                                    - For ResMed Bilevel (TiMax-4 sec   TiMin- 0.2 sec)     Humidifier: [x] Heated ()        [x] Water chamber replacement ()/ 1 per 6 months        Mask:   [x] Nasal () /1 per 3 months [] Full Face () /1 per 3 months   [x] Patient choice -Size and fit mask [] Patient Choice - Size and fit mask   [] Dispense:  [] Dispense:    [x] Headgear () / 1 per 3 months [] Headgear () / 1 per 3 months   [x] Replacement Nasal Cushion ()/2 per month [] Interface Replacement ()/1 per month   [] Replacement Nasal Pillows ()/2 per month         Tubing: [x] Heated ()/1 per 3 months    [] Standard ()/1 per 3 months [] Other:           Filters: [x] Non-disposable ()/1 per 6 months     [x] Ultra-Fine, Disposable ()/2 per month        Miscellaneous: [] Chin Strap ()/ 1 per 6 months [] O2 bleed-in:       LPM   [] Oximetry on CPAP/Bilevel []  Other:          Start Order Date: 20    MEDICAL JUSTIFICATION:  I, the undersigned, certify that the above prescribed supplies are medically necessary for this patients wellbeing. In my opinion, the supplies are both reasonable and necessary in reference to accepted standards of medicalpractice in treatment of this patients condition.     Arcenio Lima, APRN - CNP      NPI: [id number]       Order Signed Date: 20    Electronically signed by

## 2020-01-13 NOTE — LETTER
[facility] Sleep Medicine  [address] [address]  Phone: [phone]  Fax: [phone]    January 13, 2020       Patient: Alicia Giang   MR Number: [id number]   YOB: 1955   Date of Visit: 1/13/2020       Fani Fregoso was seen for a follow up visit today. Here is my assessment and plan as well as an attached copy of her visit today:    Essential hypertension  Chronic- Stable. Cont meds per PCP and other physicians. Class 1 obesity without serious comorbidity with body mass index (BMI) of 31.0 to 31.9 in adult  Chronic-Stable. Encouraged her to work on weight loss through diet and exercise. Obstructive sleep apnea syndrome  Reviewed compliance download with pt. Supplies and parts as needed for her machine. These are medically necessary. Continue medications per her PCP and other physicians. Limit caffeine use after 3pm.  Encouraged her to work on weight loss through diet and exercise. Diagnoses of Essential hypertension and Class 1 obesity without serious comorbidity with body mass index (BMI) of 31.0 to 31.9 in adult, unspecified obesity type were pertinent to this visit. The chronic medical conditions listed are directly related to the primary diagnosis listed above. The management of the primary diagnosis affects the secondary diagnosis and vice versa. If you have questions or concerns, please do not hesitate to call me. I look forward to following Lavinia Post along with you.     Sincerely,    COLLEEN Orellana - CNP    CC providers:  Jasson Todd MD  [address]

## 2020-01-13 NOTE — PROGRESS NOTES
Philip Lipscomb MD, FAASM, [facility]  Mary Stroud, MSN, RN, CNP     [address] [facility] SLEEP [facility]ICINE  [address] [place] [address]  Dept: [phone]  Dept Fax: : [facility]  [address] [place] [id number] [phone]    Subjective:     Patient ID: Chel Soto is a 59 y.o. female. Chief Complaint   Patient presents with    Other     CFU       HPI:      Machine Present today:  Yes    Machine Modem/Download Info:  Compliance (hours/night): 6 hrs/night  Download AHI (/hour): 1.3 /HR  Average CPAP Pressure : 8.1 cmH2O           APAP - Settings  Pressure Min: 6 cmH2O  Pressure Max: 16 cmH2O                 Comfort Settings  Humidity Level (0-8): 3  Heated Tubing (Yes/No): Yes  Flex/EPR (0-3): 3 PAP Mask  Mask Type: Nasal mask  Mask Model: Wisp  Mask Size: L     [place] - Total score: 7    Follow-up :     Last Visit : January 2019      Patient reports the listed chronic Co-morbidities: Obesity, HTN   are well controlled and stable at this time. Subjective Health Changes: 7/2019 and 11/2019 knee replacement surgery      Over Night Oximetry: [] Yes  [] No  [x] NA [] WNL   Using O2: [] Yes  [] No  [x] NA   Patient is compliant with the machine  [x] Yes  [] No   Feeling rested when using the machine   [x] Yes  [] No     Pressure is comfortable with inspiration and expiration  [x] Yes  [] No     Noticed changes in pressure   [] Yes  [] No  [x] NA   Mask is fitting well  [x] Yes  [] No   Noting Mask Air Leak  [] Yes  [x] No   Having painful Aerophagia  [] Yes  [x] No   Nocturia   0-1  per night.    Having  HA upon waking  [] Yes  [x] No   Dry mouth upon waking   Dry Nose  Dry Eyes  [x] Yes  [] No   Congestion upon waking   [] Yes  [x] No    Nose Bleeds  [] Yes  [x] No   Using Sleep Aides    [x] NA   Understands how to change humidification and/or tubing EYE OMEGA BENEFITS/VIT D-3) [id number] MG-UNIT CAPS 2 capsules by mouth twice daily before meals 3/21/19  Yes Historical Provider, MD   Propylene Glycol (SYSTANE COMPLETE) 0.6 % SOLN instill 1 Drop by Ophthalmic route 3-4 times every day OU 3/21/19  Yes Historical Provider, MD   Multiple Vitamins-Minerals (MULTIVITAMIN PO) Take  by mouth daily. Yes Historical Provider, MD   Calcium Carbonate-Vit D-Min (CALTRATE 600+D PLUS) 600-400 MG-UNIT TABS Take  by mouth. Indications: one by mouth twice a day   Yes Historical Provider, MD       Allergies as of 01/13/2020 - Review Complete 01/13/2020   Allergen Reaction Noted    Tussionex pennkinetic er [hydrocod polst-cpm polst er]  08/04/2010    Bactrim [sulfamethoxazole-trimethoprim] Rash 05/06/2014    Doxycycline hyclate Nausea And Vomiting     Jose-tab [erythromycin] Nausea And Vomiting 08/04/2010    Sulfa antibiotics Rash 06/15/2017       Patient Active Problem List   Diagnosis    Essential hypertension    Acquired hypothyroidism    Hyperlipidemia    Skin lesion of face    DJD (degenerative joint disease) of knee    Osteopenia    Prediabetes    Endometrial polyp    Goiter    Bradycardia    Asystole (HCC)    PAC (premature atrial contraction)    PVC (premature ventricular contraction)    Abnormal ECG    Primary osteoarthritis of both knees    Stressful life events affecting family and household    Obstructive sleep apnea syndrome    Hypovitaminosis D    Primary osteoarthritis of right knee    Ovarian cyst    Acute deep vein thrombosis (DVT) of distal end of right lower extremity (HCC)    S/P TKR (total knee replacement) using cement, left    Osteoarthritis    Class 1 obesity without serious comorbidity with body mass index (BMI) of 31.0 to 31.9 in adult       Past Medical History:   Diagnosis Date    Anesthesia     \"heart stopped\" during ovarian surgery, abd insufflation. restarted on its own.  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    Endometrial polyp 12/2014    Hx of blood clots 2019    Hyperlipidemia 2/13/2013    Hypertension     pregnancy induced    Hypothyroidism     Lateral epicondylitis 7/1/2015    Plantar fasciitis of right foot 7/24/2013    Sleep apnea     uses CPAP    Sleep apnea        Past Surgical History:   Procedure Laterality Date    BREAST BIOPSY  1987    Left    FINE NEEDLE ASPIRATION  1983    Left Breast    MOUTH SURGERY  1990s    jaw surgery    OVARIAN CYST SURGERY Left 12-27-13    OVARIAN CYST SURGERY      7/2017    TONSILLECTOMY      TOTAL KNEE ARTHROPLASTY Right 7/17/2019    RIGHT TOTAL KNEE ARTHROPLASTY performed by Blayne Peralta MD at [address] Left 11/6/2019    LEFT TOTAL KNEE ARTHROPLASTY performed by Blayne Peralta MD at [facility] [place]       Family History   Problem Relation Age of Onset    Coronary Art Dis Father     Heart Failure Father         MI age 61    Dementia Father     Sleep Apnea Father     Deep Vein Thrombosis Father         multiple    Cancer Paternal Aunt         Breast Cancer and Colon Cancer    Cancer Paternal Uncle         Colon Cancer    Cancer Maternal Grandmother         Breast Cancer    Cancer Paternal Grandmother         Breast    Osteoarthritis Maternal Grandfather         arthritis- unsure type    Diabetes Sister         DM2    Dementia Sister        Vitals:  Weight BMI   Wt Readings from Last 3 Encounters:   01/13/20 168 lb (76.2 kg)   01/09/20 165 lb 0.2 oz (74.9 kg)   12/05/19 164 lb 14.5 oz (74.8 kg)    Body mass index is 31.76 kg/m². BP HR SaO2   BP Readings from Last 3 Encounters:   01/13/20 130/78   12/05/19 133/76   11/27/19 122/80    Pulse Readings from Last 3 Encounters:   01/13/20 66   12/05/19 53   11/27/19 60    SpO2 Readings from Last 3 Encounters:   01/13/20 100%   11/12/19 98%   11/07/19 92%        Assessment/Plan:     Essential hypertension  Chronic- Stable. Cont meds per PCP and other physicians.       Class 1 obesity without serious comorbidity with body mass index (BMI) of 31.0 to 31.9 in adult  Chronic-Stable. Encouraged her to work on weight loss through diet and exercise. Obstructive sleep apnea syndrome  Reviewed compliance download with pt. Supplies and parts as needed for her machine. These are medically necessary. Continue medications per her PCP and other physicians. Limit caffeine use after 3pm.  Encouraged her to work on weight loss through diet and exercise. Diagnoses of Essential hypertension and Class 1 obesity without serious comorbidity with body mass index (BMI) of 31.0 to 31.9 in adult, unspecified obesity type were pertinent to this visit. The chronic medical conditions listed are directly related to the primary diagnosis listed above. The management of the primary diagnosis affects the secondary diagnosis and vice versa. The primary encounter diagnosis was Obstructive sleep apnea syndrome. Diagnoses of Essential hypertension and Class 1 obesity without serious comorbidity with body mass index (BMI) of 31.0 to 31.9 in adult, unspecified obesity type were also pertinent to this visit. The chronic medical conditions listed are directly related to the primary diagnosis listed above. The management of the primary diagnosis affects the secondary diagnosis and vice versa. - Educated patient and reviewed compliance download with pt.    -Supplies and parts as needed for her machine, these are medically necessary.    - Patient using Other [employer] for supplies  -Continue medications per her PCP and other physicians.   -Limit caffeine use after 3pm.    -Encouraged her to work on weight loss through diet and exercise. - Educated on Medicare requirements when transitioned onto that insurance   - Educated on cleaning the supplies and machine  - Educated on mask options  -F/U: 6 month. No orders of the defined types were placed in this encounter.       No orders of the defined types were placed in this

## 2020-01-23 ENCOUNTER — HOSPITAL ENCOUNTER (OUTPATIENT)
Dept: PHYSICAL THERAPY | Age: 65
Setting detail: THERAPIES SERIES
Discharge: HOME | End: 2020-01-23
Payer: COMMERCIAL

## 2020-01-23 PROCEDURE — 97110 THERAPEUTIC EXERCISES: CPT | Performed by: PHYSICAL THERAPY ASSISTANT

## 2020-01-23 PROCEDURE — 97530 THERAPEUTIC ACTIVITIES: CPT | Performed by: PHYSICAL THERAPY ASSISTANT

## 2020-01-23 PROCEDURE — 97112 NEUROMUSCULAR REEDUCATION: CPT | Performed by: PHYSICAL THERAPY ASSISTANT

## 2020-01-23 NOTE — FLOWSHEET NOTE
10\"x 10 PS; flexed    SLR+            Isometrics      Quad sets 2x 10\"x 10 A/S; 10#  NMES for NMR          Patellar Glides 5'     Medial      Superior      Inferior            ROM      Passive 5x60' ERMI x   Active 10x KEXT/ FLEX x   Weight Shift      Hang Weights 10' Propped; 10#    Sheet Pulls      Ankle Pumps 5' Hourly          CKC      Calf raises 3x10 18# x   Wall sits 3-5xfat     Step ups 3x10 FSU/over 6'' (up with L, down with R) x   1 leg stand      Squatting 3x10 Sit<>stand 1 aeromat    CC TKE 3x 10 silver x   Balance 4' RC L8 x         PRE      Extension 3x10 RANGE: 90-0, 15# SL ECC x   Flexion 3x 10 RANGE: 0-90; 25# SL ECC x         Cable Column            Leg Press 3x10 RANGE: 80-10, 90# x         Bike 10' REC, ROM, seat 6, R 1.5-3.5 x   Treadmill 5' gait  2.3 mph    Band walking 3 laps Red, F/S x     Other Therapeutic Activities:   Single point cane PRN: FWBAT  SUPRIYA hose  Cryotherapy    Home Exercise Program:   See above and attached. Initial HEP discussed and completed. Full written, verbal, and demonstration provided. Patient Education:   Full postop instructions provided. Written and verbal guidelines provided for but not limited to: DME/ HEP/ ICE/ gait/ general medical instructions. Discussion regarding knee extension OP program for home: frequency/ duration/ intensity. Discussion for relaxation cues. Manual Treatments:   Patellar mobs/ STM         Therapeutic Exercise and NMR EXR  [x] (22960) Provided verbal/tactile cueing for activities related to strengthening, flexibility, endurance, ROM for improvements in LE, proximal hip, and core control with self care, mobility, lifting, ambulation. [x] (66651) Provided verbal/tactile cueing for activities related to improving balance, coordination, kinesthetic sense, posture, motor skill, proprioception  to assist with LE, proximal hip, and core control in self care, mobility, lifting, ambulation and eccentric single leg control.      NMR []?Fair              []?Poor     Tolerance of evaluation/treatment:   [x] Patient tolerated treatment well [x] Patient limited by fatique  [] Patient limited by pain  [] Patient limited by other medical complications  [x] Other: Continues to be challenged by stairs due to LE weakness. Moderate fatigue with change to SL ECC on PRE machines. Compliant with gym program to date. Significant functional ADL limitations include, but not limited to knee pain/ swelling/ difficulty walking/ LE muscle weakness/ limited ROM/ LE balance deficits    Prognosis: [x] Good [] Fair  [] Poor    Patient Requires Follow-up: [x] Yes  [] No    PLAN:   [] Continue per plan of care [] Alter current plan (see comments)  [x] Plan of care initiated [] Hold pending MD visit [] Discharge  Frequency/Duration:  2 days per week for 12 Weeks:  Interventions:    Plan 2x/wk for 6 weeks post op then transition to 1x/wk with additional use of fitness facility  F/u in 2 weeks for MD PT 3 month check and testing    Electronically signed by: Bernard Garcia PTA    *Note: If patient does not return for scheduled / recommended follow up visit, this note will serve as their discharge note from physical therapy. patient

## 2020-02-04 ENCOUNTER — HOSPITAL ENCOUNTER (OUTPATIENT)
Dept: PHYSICAL THERAPY | Age: 65
Setting detail: THERAPIES SERIES
Discharge: HOME | End: 2020-02-04
Payer: COMMERCIAL

## 2020-02-04 PROCEDURE — 97112 NEUROMUSCULAR REEDUCATION: CPT | Performed by: PHYSICAL THERAPY ASSISTANT

## 2020-02-04 PROCEDURE — 97530 THERAPEUTIC ACTIVITIES: CPT | Performed by: PHYSICAL THERAPY ASSISTANT

## 2020-02-04 PROCEDURE — 97110 THERAPEUTIC EXERCISES: CPT | Performed by: PHYSICAL THERAPY ASSISTANT

## 2020-02-04 PROCEDURE — 97750 PHYSICAL PERFORMANCE TEST: CPT | Performed by: PHYSICAL THERAPY ASSISTANT

## 2020-02-04 NOTE — FLOWSHEET NOTE
5  x   Towel Pull 30\"x 5           SLR      Supine 3x10 With EMS    Prone      Abduction 3x10     Adducton 2x 10\"x 10 PS; flexed    SLR+            Isometrics      Quad sets 2x 10\"x 10 A/S; 10#  NMES for NMR          Patellar Glides 5'     Medial      Superior      Inferior            ROM      Passive 5x60' ERMI    Active 10x KEXT/ FLEX    Weight Shift      Hang Weights 10' Propped; 10#    Sheet Pulls      Ankle Pumps 5' Hourly          CKC      Calf raises 3x10 18#    Wall sits 3-5xfat     Step ups 3x10 FSU/over 6'' (up with L, down with R)    1 leg stand      Squatting 3x10 Sit<>stand 1 aeromat    CC TKE 3x 10 silver    Balance 4' RC L8          PRE      Extension 3x10 RANGE: 90-0, 15# SL ECC    Flexion 3x 10 RANGE: 0-90; 25# SL ECC          Cable Column            Leg Press 3x10 RANGE: 80-10, 90#          Bike 10' REC, ROM, seat 6, R 1.5-3.5 x   Treadmill 5' gait  2.3 mph    Band walking 3 laps Red, F/S      Other Therapeutic Activities:   Functional tests   10'    Single point cane PRN: FWBAT  SUPRIYA hose  Cryotherapy    Home Exercise Program:   See above and attached. Initial HEP discussed and completed. Full written, verbal, and demonstration provided. Patient Education:   Full postop instructions provided. Written and verbal guidelines provided for but not limited to: DME/ HEP/ ICE/ gait/ general medical instructions. Discussion regarding knee extension OP program for home: frequency/ duration/ intensity. Discussion for relaxation cues. Extensive discussion regarding Biodex/ function test scores and implications for her HEP/ fitness program.    Manual Treatments:   Patellar mobs/ STM         Therapeutic Exercise and NMR EXR  [x] (89288) Provided verbal/tactile cueing for activities related to strengthening, flexibility, endurance, ROM for improvements in LE, proximal hip, and core control with self care, mobility, lifting, ambulation.   [x] (10277) Provided verbal/tactile cueing for activities Code Treatment Minutes: 54'   Total Treatment Minutes: 79'   430-540    [] EVAL: L2  [x] ZJ(87077) x  1   [] IONTO   [x] NMR (31294) x  1   [] VASO  [] Manual (11422) x       [x] Other: Physical Performance Test with Written Results  [x] TA x  1    [] Mech Traction (76225)  [] ES(attended) (20630)      [] ES (un) (34575):     GOALS:  Patient stated goal: Would like to resume her walking/ fitness program     Therapist goals for Patient:   Short Term Goals: To be achieved in: 2 weeks  1. Independent in HEP and progression per patient tolerance, in order to prevent re-injury. 2. Patient will have a decrease in pain to facilitate improvement in movement, function, and ADLs as indicated by Functional Deficits.     Long Term Goals: To be achieved in: 12 weeks PO  1. Disability index score of 40% or less for the LEFS to assist with reaching prior level of function. 2. Patient will demonstrate increased AROM to 0-125-130 deg to allow for proper joint functioning as indicated by patients Functional Deficits. 3. Patient will demonstrate an increase in Strength to good proximal hip strength and control in LE to allow for proper functional mobility as indicated by patients Functional Deficits. 4. Patient will return to 60%> functional activities without increased symptoms or restriction. 5. Patient will resume a fitness program using AHA guidelines and OA precautions including, but not limited to strength and NI cardio.                  Progression Towards Functional goals:  [x] Patient is progressing as expected towards functional goals listed. [x] Progression is slowed due to complexities listed. [] Progression has been slowed due to co-morbidities. [] Plan just implemented, too soon to assess goals progression  [x] Other: Early ROM complications     ASSESSMENT:   Functional Impairments:                [x]? Noted lumbar/proximal hip/LE hypomobility              [x]? Decreased LE functional ROM

## 2020-03-19 ENCOUNTER — APPOINTMENT (OUTPATIENT)
Dept: PHYSICAL THERAPY | Age: 65
End: 2020-03-19
Payer: COMMERCIAL

## 2020-05-06 RX ORDER — LISINOPRIL AND HYDROCHLOROTHIAZIDE 12.5; 1 MG/1; MG/1
TABLET ORAL
Qty: 30 TABLET | Refills: 2 | Status: SHIPPED | OUTPATIENT
Start: 2020-05-06 | End: 2020-07-16 | Stop reason: SDUPTHER

## 2020-05-06 RX ORDER — LEVOTHYROXINE SODIUM 112 UG/1
TABLET ORAL
Qty: 30 TABLET | Refills: 2 | Status: SHIPPED | OUTPATIENT
Start: 2020-05-06 | End: 2020-06-23

## 2020-05-06 RX ORDER — ATORVASTATIN CALCIUM 20 MG/1
TABLET, FILM COATED ORAL
Qty: 30 TABLET | Refills: 5 | Status: SHIPPED | OUTPATIENT
Start: 2020-05-06 | End: 2020-07-16 | Stop reason: SDUPTHER

## 2020-05-06 RX ORDER — AMLODIPINE BESYLATE 5 MG/1
TABLET ORAL
Qty: 30 TABLET | Refills: 2 | Status: SHIPPED | OUTPATIENT
Start: 2020-05-06 | End: 2020-06-30

## 2020-06-02 ENCOUNTER — HOSPITAL ENCOUNTER (OUTPATIENT)
Dept: PHYSICAL THERAPY | Age: 65
Setting detail: THERAPIES SERIES
End: 2020-06-02
Payer: COMMERCIAL

## 2020-06-16 ENCOUNTER — HOSPITAL ENCOUNTER (OUTPATIENT)
Dept: PHYSICAL THERAPY | Age: 65
Setting detail: THERAPIES SERIES
Discharge: HOME | End: 2020-06-16
Payer: COMMERCIAL

## 2020-06-16 PROCEDURE — 97112 NEUROMUSCULAR REEDUCATION: CPT | Performed by: PHYSICAL THERAPIST

## 2020-06-16 PROCEDURE — 97530 THERAPEUTIC ACTIVITIES: CPT | Performed by: PHYSICAL THERAPIST

## 2020-06-16 PROCEDURE — 97164 PT RE-EVAL EST PLAN CARE: CPT | Performed by: PHYSICAL THERAPIST

## 2020-06-16 PROCEDURE — 97110 THERAPEUTIC EXERCISES: CPT | Performed by: PHYSICAL THERAPIST

## 2020-06-16 NOTE — PLAN OF CARE
35.2 60° HHR (88%)   Hamstring        Gastroc         Hip  flexion         Hip abd                               6-16-20  Special Test Results/Comment   Homans (-)   Temperature (-)      6-16-20  Girth L R   Mid Patella 44.5 44.5   Suprapatellar 45.9 46.4   5cm above 47.9 47.8   15cm above          Reflexes/Sensation:               []?Dermatomes/Myotomes intact               []?Reflexes equal and normal bilaterally              [x]? Other: NT     Joint mobility:               [x]? Normal                       []?Hypo              []?Hyper     Palpation: non tender     Functional Mobility/Transfers: Independent     Posture: Neutral valgus; mild effusion     Bandages/Dressings/Incisions:  Healed     Gait: WNL     Orthopedic Special Tests:      TEST INITIAL     FOLLOW UP  2-4-20 FOLLOW  UP  6-16-20 GOAL   SINGLE LEG SQUAT TEST L: poor    R: fair L: Fair    R: Fair L: Fair; mild   wibble wobble    R: Fair; mild   wibble wobble NO KNEE VALGUS, MEDIAL/LATERAL MOVEMENT, OR PELVIC TILT  GOOD, FAIR, POOR   6 MINUTE WALK TEST . 25 miles 0.21 miles 0.32 miiles                     M             F          60-68 y/o: >.31mile,  >.30mile  66-76 y/o: >.28 mile, >.26 mile  80-81 y/o: >.21mile,  >.20 mile   STAIR CLIMBING TEST   15.04 sec < 13 SEC (M&F)     Score Sheet for Y Balance Test  6-16-20   Left  Right  Difference  Comments    Anterior  48 cm 55 cm 7 cm    Posteromedial  65 cm 67 cm 2 cm    Posterolateral  64 cm 72 cm 8 cm           Leg Length       **Difference should be less than 4 cm for return to sport and preparticipation screening (<10% deficit compared to uninvolved)**  (Star Excursion Balance Test as a Predictor of Lower Extremity Injury in High School Basketball Players)      Isometric Strength Testing Results Summary  Knee    On 2/4/2020 the patient, Chandler Rankin, underwent an Isometric Strength Test to evaluate current status and progress of the strengthening phase of her current rehabilitation program.  She is hip, and core control in self care, mobility, lifting, ambulation and eccentric single leg control. NMR and Therapeutic Activities:    [x] (44156 or 48193) Provided verbal/tactile cueing for activities related to improving balance, coordination, kinesthetic sense, posture, motor skill, proprioception and motor activation to allow for proper function of core, proximal hip and LE with self care and ADLs  [x] (97526) Gait Re-education- Provided training and instruction to the patient for proper LE, core and proximal hip recruitment and positioning and eccentric body weight control with ambulation re-education including up and down stairs     Home Exercise Program:    [x] (35552) Reviewed/Progressed HEP activities related to strengthening, flexibility, endurance, ROM of core, proximal hip and LE for functional self-care, mobility, lifting and ambulation/stair navigation   [x] (12225)Reviewed/Progressed HEP activities related to improving balance, coordination, kinesthetic sense, posture, motor skill, proprioception of core, proximal hip and LE for self care, mobility, lifting, and ambulation/stair navigation      Manual Treatments:  PROM / STM / Oscillations-Mobs:  G-I, II, III, IV (PA's, Inf., Post.)  [x] (97339) Provided manual therapy to mobilize LE, proximal hip and/or LS spine soft tissue/joints for the purpose of modulating pain, promoting relaxation,  increasing ROM, reducing/eliminating soft tissue swelling/inflammation/restriction, improving soft tissue extensibility and allowing for proper ROM for normal function with self care, mobility, lifting and ambulation.      Modalities:    [] hot packs post knee  [] EMS   [] Ultrasound  [x] ice     [] vasopneumatic  [] high volt/EGS  [] phono    [] tens    [] ionto  [] autorange/biodex   [] Interferential  [] other: CP    Charges:  Timed Code Treatment Minutes: 61'   Total Treatment Minutes: 61'       [x] EVAL: RE-EVAL  [x] RAFITA(16526) x  1   [] IONTO   [x] NMR (76526) x  1   [] VASO  [] Manual (77961) x       [] Other: Physical Performance Test with Written Results  [x] TA x  1    [] Mech Traction (24405)  [] ES(attended) (20008)      [] ES (un) (48981):     GOALS:  Patient stated goal: Would like to resume her walking/ fitness program     Therapist goals for Patient:   Short Term Goals: To be achieved in: 2 weeks  1. Independent in HEP and progression per patient tolerance, in order to prevent re-injury. 2. Patient will have a decrease in pain to facilitate improvement in movement, function, and ADLs as indicated by Functional Deficits.     Long Term Goals: To be achieved in: 12 months PO (ADJUSTED)  1. Disability index score of 20% or less for the LEFS to assist with reaching prior level of function. MET  2. Patient will demonstrate increased AROM to 0-125-130 deg to allow for proper joint functioning as indicated by patients Functional Deficits. MET  3. Patient will demonstrate an increase in Strength to good proximal hip strength and control in LE to allow for proper functional mobility as indicated by patients Functional Deficits. NOT MET  4. Patient will return to 80%> functional activities without increased symptoms or restriction. MET  5. Patient will resume a fitness program using AHA guidelines and OA precautions including, but not limited to strength and NI cardio.  NOT MET                Progression Towards Functional goals:  [x] Patient is progressing as expected towards functional goals listed. [x] Progression is slowed due to complexities listed. [] Progression has been slowed due to co-morbidities. [] Plan just implemented, too soon to assess goals progression  [x] Other: Early ROM complications     ASSESSMENT:   Functional Impairments:                [x]? Noted lumbar/proximal hip/LE hypomobility              [x]? Decreased LE functional ROM              [x]? Decreased core/proximal hip strength and neuromuscular control              [x]? Decreased consistent with pathology which may benefit from Dry needling                       []?other:       Prognosis/Rehab Potential:                                       []?Excellent              [x]? Good                         []?Fair              []?Poor     Tolerance of evaluation/treatment:   [x] Patient tolerated treatment well [x] Patient limited by fatique  [] Patient limited by pain  [] Patient limited by other medical complications  [x] Other: Pt has moderate quad weakness limiting ability to ascend/descend stairs normally. Demonstrates limited balance tolerance/ stability Pt did have L knee pain with KE during test. Lacks walking endurance and confidence on stairs. Current limitations should improve with increase in strength. Pt will continue structured gym program and return for a repeat strength test in 6 weeks. Significant functional ADL limitations include, but not limited to knee pain/ swelling/ difficulty walking/ LE muscle weakness/ limited ROM/ LE balance deficits    Prognosis: [x] Good [] Fair  [] Poor    Patient Requires Follow-up: [x] Yes  [] No    PLAN:   [x] Continue per plan of care [x] Alter current plan (see comments)  [] Plan of care initiated [] Hold pending MD visit [] Discharge  Frequency/Duration:  2 days per week for 12 Weeks:  Interventions:    F/U in 6 weeks for repeat biodex test  Repeat biodex and functional tests at 6 months post op    Electronically signed by: Kiran Jeffrey PT    *Note: If patient does not return for scheduled / recommended follow up visit, this note will serve as their discharge note from physical therapy.

## 2020-06-23 RX ORDER — LEVOTHYROXINE SODIUM 112 UG/1
TABLET ORAL
Qty: 30 TABLET | Refills: 1 | Status: SHIPPED | OUTPATIENT
Start: 2020-06-23 | End: 2020-07-16 | Stop reason: SDUPTHER

## 2020-06-29 ENCOUNTER — TELEPHONE (OUTPATIENT)
Dept: PULMONOLOGY | Age: 65
End: 2020-06-29

## 2020-06-30 RX ORDER — AMLODIPINE BESYLATE 5 MG/1
TABLET ORAL
Qty: 30 TABLET | Refills: 4 | Status: SHIPPED | OUTPATIENT
Start: 2020-06-30 | End: 2020-07-16 | Stop reason: SDUPTHER

## 2020-07-13 ENCOUNTER — VIRTUAL VISIT (OUTPATIENT)
Dept: PULMONOLOGY | Age: 65
End: 2020-07-13
Payer: COMMERCIAL

## 2020-07-13 PROCEDURE — 99214 OFFICE O/P EST MOD 30 MIN: CPT | Performed by: NURSE PRACTITIONER

## 2020-07-13 ASSESSMENT — SLEEP AND FATIGUE QUESTIONNAIRES
HOW LIKELY ARE YOU TO NOD OFF OR FALL ASLEEP WHILE SITTING INACTIVE IN A PUBLIC PLACE: 0
HOW LIKELY ARE YOU TO NOD OFF OR FALL ASLEEP WHILE SITTING AND READING: 1
HOW LIKELY ARE YOU TO NOD OFF OR FALL ASLEEP WHEN YOU ARE A PASSENGER IN A CAR FOR AN HOUR WITHOUT A BREAK: 0
ESS TOTAL SCORE: 6
HOW LIKELY ARE YOU TO NOD OFF OR FALL ASLEEP IN A CAR, WHILE STOPPED FOR A FEW MINUTES IN TRAFFIC: 0
HOW LIKELY ARE YOU TO NOD OFF OR FALL ASLEEP WHILE WATCHING TV: 2
HOW LIKELY ARE YOU TO NOD OFF OR FALL ASLEEP WHILE LYING DOWN TO REST IN THE AFTERNOON WHEN CIRCUMSTANCES PERMIT: 2
HOW LIKELY ARE YOU TO NOD OFF OR FALL ASLEEP WHILE SITTING AND TALKING TO SOMEONE: 0
HOW LIKELY ARE YOU TO NOD OFF OR FALL ASLEEP WHILE SITTING QUIETLY AFTER LUNCH WITHOUT ALCOHOL: 1

## 2020-07-13 NOTE — PROGRESS NOTES
Deo Pierce         : 1955    Diagnosis: [x] RAMANDEEP (G47.33) [] CSA (G47.31) [] Apnea (G47.30)   Length of Need: [x] 12 Months [] 99 Months [] Other:    Machine (ARLYN!): [x] Respironics Dream Station      Auto [] ResMed AirSense     Auto [] Other:     []  CPAP () [] Bilevel ()   Mode: [] Auto [] Spontaneous    Mode: [] Auto [] Spontaneous                            Comfort Settings:   - Ramp Pressure:  cmH2O                                        - Ramp time: 15 min                                     -  Flex/EPR - 3 full time                                    - For ResMed Bilevel (TiMax-4 sec   TiMin- 0.2 sec)     Humidifier: [x] Heated ()        [x] Water chamber replacement ()/ 1 per 6 months        Mask:   [x] Nasal () /1 per 3 months [] Full Face () /1 per 3 months   [x] Patient choice -Size and fit mask [] Patient Choice - Size and fit mask   [] Dispense:  [] Dispense:    [x] Headgear () / 1 per 3 months [] Headgear () / 1 per 3 months   [x] Replacement Nasal Cushion ()/2 per month [] Interface Replacement ()/1 per month   [] Replacement Nasal Pillows ()/2 per month         Tubing: [x] Heated ()/1 per 3 months    [] Standard ()/1 per 3 months [] Other:           Filters: [x] Non-disposable ()/1 per 6 months     [x] Ultra-Fine, Disposable ()/2 per month        Miscellaneous: [] Chin Strap ()/ 1 per 6 months [] O2 bleed-in:       LPM   [] Oximetry on CPAP/Bilevel []  Other:    [x] Modem: ()         Start Order Date: 20    MEDICAL JUSTIFICATION:  I, the undersigned, certify that the above prescribed supplies are medically necessary for this patients wellbeing. In my opinion, the supplies are both reasonable and necessary in reference to accepted standards of medicalpractice in treatment of this patients condition.     COLLEEN Salgado - CNP      NPI: [id number]       Order Signed Date: 07/13/20    Electronically signed by COLLEEN Chapman CNP on 7/13/2020 at 8:52 AM

## 2020-07-13 NOTE — ASSESSMENT & PLAN NOTE
Reviewed compliance download with pt. Supplies and parts as needed for her machine. These are medically necessary. Continue medications per her PCP and other physicians. Limit caffeine use after 3pm.  Encouraged her to work on weight loss through diet and exercise. Diagnoses of Class 1 obesity without serious comorbidity with body mass index (BMI) of 31.0 to 31.9 in adult, unspecified obesity type and Essential hypertension were pertinent to this visit. The chronic medical conditions listed are directly related to the primary diagnosis listed above. The management of the primary diagnosis affects the secondary diagnosis and vice versa.

## 2020-07-13 NOTE — PROGRESS NOTES
Vidhya Negrete         : 1955    Diagnosis: [x] RAMANDEEP (G47.33) [] CSA (G47.31) [] Apnea (G47.30)   Length of Need: [x] 12 Months [] 99 Months [] Other:    Machine (ARLYN!): [x] Respironics Dream Station      Auto [] ResMed AirSense     Auto [] Other:     []  CPAP () [] Bilevel ()   Mode: [] Auto [] Spontaneous    Mode: [] Auto [] Spontaneous                            Comfort Settings:   - Ramp Pressure:  cmH2O                                        - Ramp time: 15 min                                     -  Flex/EPR - 3 full time                                    - For ResMed Bilevel (TiMax-4 sec   TiMin- 0.2 sec)     Humidifier: [x] Heated ()        [x] Water chamber replacement ()/ 1 per 6 months        Mask:   [x] Nasal () /1 per 3 months [] Full Face () /1 per 3 months   [x] Patient choice -Size and fit mask [] Patient Choice - Size and fit mask   [] Dispense:  [] Dispense:    [x] Headgear () / 1 per 3 months [] Headgear () / 1 per 3 months   [] Replacement Nasal Cushion ()/2 per month [] Interface Replacement ()/1 per month   [x] Replacement Nasal Pillows ()/2 per month         Tubing: [x] Heated ()/1 per 3 months    [] Standard ()/1 per 3 months [] Other:           Filters: [x] Non-disposable ()/1 per 6 months     [x] Ultra-Fine, Disposable ()/2 per month        Miscellaneous: [] Chin Strap ()/ 1 per 6 months [] O2 bleed-in:       LPM   [] Oximetry on CPAP/Bilevel []  Other:    [x] Modem: ()         Start Order Date: 20    MEDICAL JUSTIFICATION:  I, the undersigned, certify that the above prescribed supplies are medically necessary for this patients wellbeing. In my opinion, the supplies are both reasonable and necessary in reference to accepted standards of medicalpractice in treatment of this patients condition.     COLLEEN Isaac CNP      NPI: [id number]       Order Signed Date: 07/13/20    Electronically signed by COLLEEN Burk CNP on 7/13/2020 at 8:52 AM

## 2020-07-13 NOTE — LETTER
[facility]  [address] [facility] [address]  Phone: [phone]  Fax: [phone]    July 13, 2020       Patient: Alfonzo Hartman   MR Number: [id number]   YOB: 1955   Date of Visit: 7/13/2020       Adalgisa Born was seen for a follow up visit today. Here is my assessment and plan as well as an attached copy of her visit today:    Class 1 obesity without serious comorbidity with body mass index (BMI) of 31.0 to 31.9 in adult  Chronic-Stable. Encouraged her to work on weight loss through diet and exercise. Essential hypertension  Chronic- Stable. Cont meds per PCP and other physicians. Obstructive sleep apnea syndrome  Reviewed compliance download with pt. Supplies and parts as needed for her machine. These are medically necessary. Continue medications per her PCP and other physicians. Limit caffeine use after 3pm.  Encouraged her to work on weight loss through diet and exercise. Diagnoses of Class 1 obesity without serious comorbidity with body mass index (BMI) of 31.0 to 31.9 in adult, unspecified obesity type and Essential hypertension were pertinent to this visit. The chronic medical conditions listed are directly related to the primary diagnosis listed above. The management of the primary diagnosis affects the secondary diagnosis and vice versa. If you have questions or concerns, please do not hesitate to call me. I look forward to following Ana Luisa Frost along with you.     Sincerely,    COLLEEN Jacobs - CNP    CC providers:  Sandra Szymanski MD  [address] [facility] [address] - [address]

## 2020-07-13 NOTE — PROGRESS NOTES
Sylvester Lambert MD, FAASM, [facility]  Marian Nava, MSN, RN, CNP     [address] [place] [address] SLEEP MEDICINE  [address] [facility]  Dept: [phone]  Dept Fax: [phone]: [facility] SLEEP MEDICINE  [address] [place] [id number] [phone]    Subjective:     Patient ID: Dee Kumari is a 72 y.o. female. Chief Complaint   Patient presents with    Sleep Apnea       HPI:      Sleep Medicine Video Visit    Pursuant to the emergency declaration under the [address] [place] [address], formerly [facility] waiver authority and the [employer] Act this Telephone Visit was insisted, with patient's consent, to reduce the patient's risk of exposure to COVID-19 and provide continuity of care for an established patient. Services were provided through a synchronous discussion over a telephone and/or Video chat to substitute for in-person clinic visit, and coded as such. While patient is at home. Machine Modem/Download Info:  Compliance (hours/night): 6.4 hrs/night  Download AHI (/hour): 1.3 /HR  Average CPAP Pressure : 10.8 cmH2O           APAP - Settings  Pressure Min: 6 cmH2O  Pressure Max: 16 cmH2O                 Comfort Settings  Humidity Level (0-8): 3  Flex/EPR (0-3): 3 PAP Mask  Mask Type: Nasal mask     [place] - Total score: 6    Follow-up :     Last Visit : January 2020      Patient reports the listed chronic Co-morbidities: HTN, Obesity    are well controlled and stable at this time.      Subjective Health Changes: None     Over Night Oximetry: [] Yes  [] No  [x] NA [] WNL   Using O2: [] Yes  [] No  [x] NA   Patient is compliant with the machine  [x] Yes  [] No   Feeling rested when using the machine   [x] Yes  [] No     Pressure is comfortable with inspiration and expiration  [x] Yes  [] No     Noticed changes in pressure   [] Yes  [] No  [x] NA   Mask is fitting well  [x] Yes  [] No   Noting Mask Air Leak  [] Yes  [x] No   Having painful Aerophagia  [] Yes  [x] No   Nocturia   1  per night. Having  HA upon waking  [] Yes  [x] No   Dry mouth upon waking   Dry Nose  Dry Eyes  [] Yes  [x] No   Congestion upon waking   [] Yes  [x] No    Nose Bleeds  [] Yes  [x] No   Using Sleep Aides    [x] NA   Understands how to change humidification and/or tubing temperature for comfort while at home  [x] Yes  [] No     Difficulties falling asleep  [] Yes  [x] No   Difficulties staying asleep  [] Yes  [x] No   Approximate time to bed  10-11pm   Approximate wake time  6am   Taking Naps  occasional   If taking naps usual length  20-30 min    If taking naps using the machine  [] Yes  [x] No  [] NA [] With and With out    Drowsy when driving  [] Yes  [x] No     Does patient carry a DOT/CDL  [] Yes  [x] No     Does patient carry FAA/[employer] License   [] Yes  [x] No      Any concerns noted with the machine at this time  [] Yes  [x] No        Diagnosis Orders   1. Obstructive sleep apnea syndrome     2. Class 1 obesity without serious comorbidity with body mass index (BMI) of 31.0 to 31.9 in adult, unspecified obesity type     3. Essential hypertension         The chronic medical conditions listed are directly related to the primary diagnosis listed above. The management of the primary diagnosis affects the secondary diagnosis and vice versa. Assessment/Plan:     Class 1 obesity without serious comorbidity with body mass index (BMI) of 31.0 to 31.9 in adult  Chronic-Stable. Encouraged her to work on weight loss through diet and exercise. Essential hypertension  Chronic- Stable. Cont meds per PCP and other physicians. Obstructive sleep apnea syndrome  Reviewed compliance download with pt. Supplies and parts as needed for her machine. These are medically necessary. Continue medications per her PCP and other physicians.  Limit caffeine use after 3pm.  Encouraged her to work on weight loss through diet and exercise. Diagnoses of Class 1 obesity without serious comorbidity with body mass index (BMI) of 31.0 to 31.9 in adult, unspecified obesity type and Essential hypertension were pertinent to this visit. The chronic medical conditions listed are directly related to the primary diagnosis listed above. The management of the primary diagnosis affects the secondary diagnosis and vice versa. The primary encounter diagnosis was Obstructive sleep apnea syndrome. Diagnoses of Class 1 obesity without serious comorbidity with body mass index (BMI) of 31.0 to 31.9 in adult, unspecified obesity type and Essential hypertension were also pertinent to this visit. The chronic medical conditions listed are directly related to the primary diagnosis listed above. The management of the primary diagnosis affects the secondary diagnosis and vice versa. - Educated patient and reviewed compliance download with pt.    -Supplies and parts as needed for her machine, these are medically necessary.    - Patient using [employer] for supplies  -Continue medications per her PCP and other physicians.   -Limit caffeine use after 3pm.    -Encouraged her to work on weight loss through diet and exercise. -  Patient able to access video feed. Visit completed via video chat communications. 20 min spent with patient.   - Education on mask switching procedures   -F/U: 12 month. No orders of the defined types were placed in this encounter. No orders of the defined types were placed in this encounter. No orders of the defined types were placed in this encounter.       Elvia Solares, MSN, RN, CNP

## 2020-07-16 ENCOUNTER — TELEPHONE (OUTPATIENT)
Dept: INTERNAL MEDICINE CLINIC | Age: 65
End: 2020-07-16

## 2020-07-16 ENCOUNTER — VIRTUAL VISIT (OUTPATIENT)
Dept: INTERNAL MEDICINE CLINIC | Age: 65
End: 2020-07-16
Payer: MEDICARE

## 2020-07-16 PROCEDURE — 99214 OFFICE O/P EST MOD 30 MIN: CPT | Performed by: INTERNAL MEDICINE

## 2020-07-16 RX ORDER — ATORVASTATIN CALCIUM 20 MG/1
20 TABLET, FILM COATED ORAL DAILY
Qty: 90 TABLET | Refills: 1 | Status: SHIPPED | OUTPATIENT
Start: 2020-07-16 | End: 2020-12-08

## 2020-07-16 RX ORDER — AMLODIPINE BESYLATE 5 MG/1
5 TABLET ORAL DAILY
Qty: 90 TABLET | Refills: 1 | Status: SHIPPED | OUTPATIENT
Start: 2020-07-16 | End: 2020-12-08

## 2020-07-16 RX ORDER — LEVOTHYROXINE SODIUM 112 UG/1
112 TABLET ORAL DAILY
Qty: 90 TABLET | Refills: 1 | Status: SHIPPED | OUTPATIENT
Start: 2020-07-16 | End: 2020-12-08

## 2020-07-16 RX ORDER — LISINOPRIL AND HYDROCHLOROTHIAZIDE 12.5; 1 MG/1; MG/1
1 TABLET ORAL DAILY
Qty: 90 TABLET | Refills: 1 | Status: SHIPPED | OUTPATIENT
Start: 2020-07-16 | End: 2020-12-08

## 2020-07-16 ASSESSMENT — ENCOUNTER SYMPTOMS
WHEEZING: 0
SHORTNESS OF BREATH: 0
COUGH: 0

## 2020-07-16 NOTE — TELEPHONE ENCOUNTER
Will not need an rx. She will need pneumovax-23. She can just tell pharmacist that is what she is looking to get.

## 2020-07-16 NOTE — PROGRESS NOTES
MG-UNIT TABS Take  by mouth. 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 Substance Use Topics    Alcohol use: Yes     Alcohol/week: 0.0 standard drinks     Frequency: 2-4 times a month     Drinks per session: 1 or 2     Comment: I drink only occasionally, less than once a month.  Drug use: No        Past Medical History:   Diagnosis Date    Anesthesia     \"heart stopped\" during ovarian surgery, abd insufflation. restarted on its own. 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    Endometrial polyp 12/2014    Hx of blood clots 2019    Hyperlipidemia 2/13/2013    Hypertension     pregnancy induced    Hypothyroidism     Lateral epicondylitis 7/1/2015    Plantar fasciitis of right foot 7/24/2013    Sleep apnea     uses CPAP    Sleep apnea    ,   Past Surgical History:   Procedure Laterality Date    BREAST BIOPSY  1987    Left    FINE NEEDLE ASPIRATION  1983    Left Breast    MOUTH SURGERY  1990s    jaw surgery    OVARIAN CYST SURGERY Left 12-27-13    OVARIAN CYST SURGERY      7/2017    TONSILLECTOMY      TOTAL KNEE ARTHROPLASTY Right 7/17/2019    RIGHT TOTAL KNEE ARTHROPLASTY performed by Kimberlyn Sotelo MD at [facility] Left 11/6/2019    LEFT TOTAL KNEE ARTHROPLASTY performed by Kimberlyn Sotelo MD at [address]   ,   Social History     Tobacco Use    Smoking status: Never Smoker    Smokeless tobacco: Never Used    Tobacco comment: 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 Substance Use Topics    Alcohol use: Yes     Alcohol/week: 0.0 standard drinks     Frequency: 2-4 times a month     Drinks per session: 1 or 2     Comment: I drink only occasionally, less than once a month.     Drug use: No   ,   Family History   Problem Relation Age of Onset    Coronary Art Dis Father     Heart Failure Father         MI age 61    Dementia Father     Sleep Apnea Father     Deep Vein Thrombosis Father         multiple    Cancer Paternal Aunt         Breast Cancer and Colon Cancer    Cancer Paternal Uncle         Colon Cancer    Cancer Maternal Grandmother         Breast Cancer    Cancer Paternal Grandmother         Breast    Osteoarthritis Maternal Grandfather         arthritis- unsure type    Diabetes Sister         DM2    Dementia Sister        PHYSICAL EXAMINATION:    General - well developed, well nourished, NAD  Mental status - Awake and alert, Oriented x 3. Follows commands. Eyes - EOMI, Sclera normal, No conjunctival discharge or injection  HENT: NCAT, MMM. Normal external ears  Neck - no visualized masses, nl ROM  Pulmonary/Chest - Speaking in full sentences, effort normal, no distress. MSK - Normal gait with no signs of ataxia. Neuro - No facial asymmetry, no gaze palzy  Skin - no rashes or discoloration of facial skin  Psych - nl appearance and grooming, affect appropriate to mood, no hallucinations  Other pertinent observable physical exam findings-       ASSESSMENT/PLAN:  1. Colon cancer screening  - [facility] - Víctor MD Amy, Gastroenterology, [place]    2. Need for pneumococcal vaccination  - Pneumococcal polysaccharide vaccine 23-valent greater than or equal to 1yo subcutaneous/IM; Future    3. Essential hypertension  At goal. Continue current meds. Not due for labs. Continue regular exercise and attempts at weight loss. - lisinopril-hydroCHLOROthiazide (PRINZIDE;ZESTORETIC) 10-12.5 MG per tablet; Take 1 tablet by mouth daily  Dispense: 90 tablet; Refill: 1  - amLODIPine (NORVASC) 5 MG tablet; Take 1 tablet by mouth daily  Dispense: 90 tablet; Refill: 1    4. Acquired hypothyroidism  TSH in march was at goal. Clinically euthyroid. Continue current levothyroxine dose. Labs at next visit.    - levothyroxine (SYNTHROID) 112 MCG tablet; Take 1 tablet by mouth Daily  Dispense: 90 tablet; Refill: 1    5. Mixed hyperlipidemia  Continue current atorvastatin. Last labs at goal. Continue lifestyle modifications. Return in about 6 months (around 1/16/2021) for welcome to medicare + chronic. Mirta Barger is a 72 y.o. female being evaluated by a Virtual Visit (video visit) encounter to address concerns as mentioned above. A caregiver was present when appropriate. Due to this being a TeleHealth encounter (During WOAYD-89 public health emergency), evaluation of the following organ systems was limited: Vitals/Constitutional/EENT/Resp/CV/GI//MS/Neuro/Skin/Heme-Lymph-Imm. Pursuant to the emergency declaration under the [address] [place] [address], [address] [place] [address] authority and the [employer] Act, this Virtual Visit was conducted with patient's (and/or legal guardian's) consent, to reduce the patient's risk of exposure to COVID-19 and provide necessary medical care. The patient (and/or legal guardian) has also been advised to contact this office for worsening conditions or problems, and seek emergency medical treatment and/or call 911 if deemed necessary. Patient identification was verified at the start of the visit: Yes    Total time spent on this encounter: Not billed by time    Services were provided through a video synchronous discussion virtually to substitute for in-person clinic visit. Patient and provider were located at their individual homes. --Adele Murillo MD on 7/16/2020 at 2:45 PM    An electronic signature was used to authenticate this note.

## 2020-07-27 ENCOUNTER — OFFICE VISIT (OUTPATIENT)
Dept: CARDIOLOGY CLINIC | Age: 65
End: 2020-07-27
Payer: MEDICARE

## 2020-07-27 VITALS
HEART RATE: 72 BPM | HEIGHT: 61 IN | WEIGHT: 168 LBS | SYSTOLIC BLOOD PRESSURE: 130 MMHG | DIASTOLIC BLOOD PRESSURE: 68 MMHG | BODY MASS INDEX: 31.72 KG/M2 | TEMPERATURE: 97.4 F

## 2020-07-27 PROCEDURE — 99214 OFFICE O/P EST MOD 30 MIN: CPT | Performed by: INTERNAL MEDICINE

## 2020-07-27 NOTE — PROGRESS NOTES
72 y.o. here for follow-up on palps, valve disease. No cp. No sob. No n/v/LH/dizziness. No syncope. No sig LE edema. BP been controlled. No orthopnea or PND. Uses her CPAP. Goes to a gym several days per week; no problems. Past Medical History:   Diagnosis Date    Anesthesia     \"heart stopped\" during ovarian surgery, abd insufflation. restarted on its own. 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    Endometrial polyp 12/2014    Hx of blood clots 2019    Hyperlipidemia 2/13/2013    Hypertension     pregnancy induced    Hypothyroidism     Lateral epicondylitis 7/1/2015    Plantar fasciitis of right foot 7/24/2013    Sleep apnea     uses CPAP    Sleep apnea      Past Surgical History:   Procedure Laterality Date    BREAST BIOPSY  1987    Left    FINE NEEDLE ASPIRATION  1983    Left Breast    MOUTH SURGERY  1990s    jaw surgery    OVARIAN CYST SURGERY Left 12-27-13    OVARIAN CYST SURGERY      7/2017    TONSILLECTOMY      TOTAL KNEE ARTHROPLASTY Right 7/17/2019    RIGHT TOTAL KNEE ARTHROPLASTY performed by Kimberlyn Sotelo MD at [place] [address] Left 11/6/2019    LEFT TOTAL KNEE ARTHROPLASTY performed by Kimberlyn Sotelo MD at [address] [place] [address] History     Tobacco Use    Smoking status: Never Smoker    Smokeless tobacco: Never Used    Tobacco comment: 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 Substance Use Topics    Alcohol use: Yes     Alcohol/week: 0.0 standard drinks     Frequency: 2-4 times a month     Drinks per session: 1 or 2     Comment: I drink only occasionally, less than once a month.     Drug use: No     Allergies   Allergen Reactions    Tussionex Pennkinetic Er [Hydrocod Polst-Cpm Polst Er]     Bactrim [Sulfamethoxazole-Trimethoprim] Rash    Doxycycline Hyclate Nausea And Vomiting    Jose-Tab [Erythromycin] Nausea And Vomiting    Sulfa Antibiotics Rash     Family History   Problem Relation Age of Onset    Coronary Art Dis Father     Heart Failure Father         MI age 61    Dementia Father     Sleep Apnea Father     Deep Vein Thrombosis Father         multiple    Cancer Paternal Aunt         Breast Cancer and Colon Cancer    Cancer Paternal Uncle         Colon Cancer    Cancer Maternal Grandmother         Breast Cancer    Cancer Paternal Grandmother         Breast    Osteoarthritis Maternal Grandfather         arthritis- unsure type    Diabetes Sister         DM2    Dementia Sister      Review of Systems   General: No fevers, chills, fatigue, or night sweats. No abnormal changes in weight. HEENT: No blurry or decreased vision. No changes in hearing, nasal discharge or sore throat. Cardiovascular: See HPI. No cramping in legs or buttocks when walking. Respiratory: No cough, hemoptysis, or wheezing. No history of asthma. Gastrointestinal: No abdominal pain, hematochezia, melana, or history of GI ulcers. Genito-Urinary: No dysuria or hematuria. No urgency or polyuria. Musculoskeletal: No complaints of joint pain, joint swelling or muscular weakness/soreness. Neurological: No dizziness or headaches. No numbness/tingling, speech problems or weakness. No history of a stroke or TIA. Psychological: No new anxiety or depression  Hematological and Lymphatic: No abnormal bleeding or bruising, blood clots, jaundice. Endocrine: No malaise/lethargy, palpitations, polydipsia/polyuria, temperature intolerance or unexpected weight changes. Skin: No rashes or non-healing ulcers. PE:  Blood pressure 130/68, pulse 72, temperature 97.4 °F (36.3 °C), height 5' 1\" (1.549 m), weight 168 lb (76.2 kg), not currently breastfeeding. General (appearance): Well devel. No distress. Eyes: Anicteric. EOMI  Neck: Supple. No jvd  Ears/Nose/Mouth/Thorat: No cyanosis  CV: RRR. No m/r/g   Respiratory:  Clear b, normal respiratory effort  GI: Abd s/nt/nd. No peritoneal signs  Skin: Warm, dry.  No rashes  Neuro/Psych: Alert and oriented x 3. Appropriate behavior  Ext:  No c/c. No pitting edema  Pulses:  2+ carotid. No bruits      Lab Results   Component Value Date    WBC 6.1 11/04/2019    HGB 11.4 (L) 11/07/2019    HCT 33.7 (L) 11/07/2019    MCV 91.4 11/04/2019     11/04/2019       Chemistry        Component Value Date/Time     12/20/2019 1209    K 4.1 12/20/2019 1209     12/20/2019 1209    CO2 23 12/20/2019 1209    BUN 13 12/20/2019 1209    CREATININE 0.6 12/20/2019 1209        Component Value Date/Time    CALCIUM 9.9 12/20/2019 1209    ALKPHOS 78 12/20/2019 1209    AST 22 12/20/2019 1209    ALT 23 12/20/2019 1209    BILITOT 0.8 12/20/2019 1209        Lab Results   Component Value Date    TSH 0.60 03/02/2020     Lab Results   Component Value Date    CHOL 144 12/20/2019    CHOL 217 (H) 11/12/2019    CHOL 218 (H) 07/24/2017     Lab Results   Component Value Date    TRIG 153 (H) 12/20/2019    TRIG 209 (H) 11/12/2019    TRIG 125 07/24/2017     Lab Results   Component Value Date    HDL 59 12/20/2019    HDL 51 11/12/2019    HDL 62 (H) 07/24/2017     Lab Results   Component Value Date    LDLCALC 54 12/20/2019    LDLCALC 124 (H) 11/12/2019    LDLCALC 131 (H) 07/24/2017     Lab Results   Component Value Date    LABVLDL 31 12/20/2019    LABVLDL 42 11/12/2019    LABVLDL 25 07/24/2017     No results found for: CHOLHDLRATIO    Studies reviewed:  7/2019 TTE: EF 60-65%. Tr MR and TR. Tr AR. PASP 24. Mild LAE    7/2019 Nuc Stress: No ischemia    7/10/2019 ECG: NSR, PRWP, IRBBB    10/2017 Holter. Sinus. HRs . PACs, PVCs. 7-beat run of svt.  Palps noted with sinus rhythm, sinus felix, and pac couplet     The 10-year ASCVD risk score (Winston Huang., et al., 2013) is: 6.3%    Values used to calculate the score:      Age: 72 years      Sex: Female      Is Non- : No      Diabetic: No      Tobacco smoker: No      Systolic Blood Pressure: 480 mmHg      Is BP treated: Yes      HDL Cholesterol: 59 mg/dL      Total Cholesterol: 144 mg/dL    Current Outpatient Medications   Medication Instructions    amLODIPine (NORVASC) 5 mg, Oral, DAILY    atorvastatin (LIPITOR) 20 mg, Oral, DAILY    Calcium Carbonate-Vit D-Min (CALTRATE 600+D PLUS) 600-400 MG-UNIT TABS Take  by mouth. Indications: one by mouth twice a day    levothyroxine (SYNTHROID) 112 mcg, Oral, DAILY    lisinopril-hydroCHLOROthiazide (PRINZIDE;ZESTORETIC) 10-12.5 MG per tablet 1 tablet, Oral, DAILY    Multiple Vitamins-Minerals (MULTIVITAMIN PO) Oral, DAILY    Omega-3 Fat Ac-Cholecalciferol (DRY EYE OMEGA BENEFITS/VIT D-3) 041-600 MG-UNIT CAPS 2 capsules by mouth twice daily before meals    Propylene Glycol (SYSTANE COMPLETE) 0.6 % SOLN instill 1 Drop by Ophthalmic route 3-4 times every day OU         A/P:  72 y.o. here for f/u on valve disease. Seen previously in pre-op for knees. 1. Mixed hyperlipidemia    2. Nonrheumatic aortic valve insufficiency    3. Abnormal ECG      Recs:  -Cont amlodipine and lisin/hctz  -Atorvastatin; lipids at goal  -F/U 1 year    At least 25 minutes was spent with the patient/patient and family, over half of which time was spent on counseling and coordinating care for the above plan. Semaj Ponce MD, [facility]

## 2020-09-30 ENCOUNTER — PATIENT MESSAGE (OUTPATIENT)
Dept: INTERNAL MEDICINE CLINIC | Age: 65
End: 2020-09-30

## 2020-09-30 NOTE — TELEPHONE ENCOUNTER
From: Gilberto Cortes  To: Galo Hansen MD  Sent: 9/30/2020 3:07 PM EDT  Subject: Non-Urgent Rhiannon Field. This isn't a question, I just wanted to let you know that I had a normal mammogram at [facility] last Friday, September 25. I don't think results are always sent out as requested, so I wanted you to know for your records in case you did not get this information. You should get notice that Jayna Edwards and I had our geezer-strength flu shots this morning. I hope that everything is going well for you and that you and all your patients are staying healthy.     Ralf Byrd

## 2020-12-08 RX ORDER — AMLODIPINE BESYLATE 5 MG/1
TABLET ORAL
Qty: 90 TABLET | Refills: 1 | Status: SHIPPED | OUTPATIENT
Start: 2020-12-08 | End: 2021-06-07

## 2020-12-08 RX ORDER — LISINOPRIL AND HYDROCHLOROTHIAZIDE 12.5; 1 MG/1; MG/1
TABLET ORAL
Qty: 90 TABLET | Refills: 1 | Status: SHIPPED | OUTPATIENT
Start: 2020-12-08 | End: 2021-06-07

## 2020-12-08 RX ORDER — ATORVASTATIN CALCIUM 20 MG/1
TABLET, FILM COATED ORAL
Qty: 90 TABLET | Refills: 1 | Status: SHIPPED | OUTPATIENT
Start: 2020-12-08 | End: 2021-06-07

## 2020-12-08 RX ORDER — LEVOTHYROXINE SODIUM 112 UG/1
TABLET ORAL
Qty: 90 TABLET | Refills: 1 | Status: SHIPPED | OUTPATIENT
Start: 2020-12-08 | End: 2021-01-15

## 2021-01-14 ENCOUNTER — VIRTUAL VISIT (OUTPATIENT)
Dept: INTERNAL MEDICINE CLINIC | Age: 66
End: 2021-01-14
Payer: MEDICARE

## 2021-01-14 DIAGNOSIS — E03.9 ACQUIRED HYPOTHYROIDISM: Primary | ICD-10-CM

## 2021-01-14 DIAGNOSIS — I10 ESSENTIAL HYPERTENSION: ICD-10-CM

## 2021-01-14 DIAGNOSIS — I82.4Z1 ACUTE DEEP VEIN THROMBOSIS (DVT) OF DISTAL END OF RIGHT LOWER EXTREMITY (HCC): ICD-10-CM

## 2021-01-14 DIAGNOSIS — R73.03 PREDIABETES: ICD-10-CM

## 2021-01-14 DIAGNOSIS — E55.9 HYPOVITAMINOSIS D: ICD-10-CM

## 2021-01-14 DIAGNOSIS — M54.31 RIGHT SIDED SCIATICA: ICD-10-CM

## 2021-01-14 DIAGNOSIS — E78.2 MIXED HYPERLIPIDEMIA: ICD-10-CM

## 2021-01-14 PROCEDURE — 99214 OFFICE O/P EST MOD 30 MIN: CPT | Performed by: INTERNAL MEDICINE

## 2021-01-14 ASSESSMENT — ENCOUNTER SYMPTOMS
SHORTNESS OF BREATH: 0
CONSTIPATION: 0
COUGH: 0
WHEEZING: 0
DIARRHEA: 0
VOMITING: 0
NAUSEA: 0

## 2021-01-14 ASSESSMENT — LIFESTYLE VARIABLES
AUDIT-C TOTAL SCORE: INCOMPLETE
HOW OFTEN DO YOU HAVE A DRINK CONTAINING ALCOHOL: 0
HOW OFTEN DO YOU HAVE A DRINK CONTAINING ALCOHOL: NEVER
AUDIT TOTAL SCORE: INCOMPLETE

## 2021-01-14 ASSESSMENT — PATIENT HEALTH QUESTIONNAIRE - PHQ9
SUM OF ALL RESPONSES TO PHQ9 QUESTIONS 1 & 2: 0
2. FEELING DOWN, DEPRESSED OR HOPELESS: 0
SUM OF ALL RESPONSES TO PHQ QUESTIONS 1-9: 0
1. LITTLE INTEREST OR PLEASURE IN DOING THINGS: 0

## 2021-01-14 NOTE — PROGRESS NOTES
Patient-Reported Temperature 97.0        Physical Exam   General - well developed, well nourished, NAD  Mental status - Awake and alert, Oriented x 3. Follows commands. Eyes - EOMI, Sclera normal, No conjunctival discharge or injection  HENT: NCAT, MMM. Normal external ears  Neck - no visualized masses, nl ROM  Pulmonary/Chest - Speaking in full sentences, effort normal, no distress. MSK - Normal gait with no signs of ataxia. Neuro - No facial asymmetry, no gaze palzy  Skin - no rashes or discoloration of facial skin  Psych - nl appearance and grooming, affect appropriate to mood, no hallucinations  Other pertinent observable physical exam findings-                   Sajan Lyman is a 72 y.o. female being evaluated by a Virtual Visit (video visit) encounter to address concerns as mentioned above. A caregiver was present when appropriate. Due to this being a TeleHealth encounter (During YKRBA-23 public health emergency), evaluation of the following organ systems was limited: Vitals/Constitutional/EENT/Resp/CV/GI//MS/Neuro/Skin/Heme-Lymph-Imm. Pursuant to the emergency declaration under the [facility], [address] [place] [address] authority and the [employer] Act, this Virtual Visit was conducted with patient's (and/or legal guardian's) consent, to reduce the patient's risk of exposure to COVID-19 and provide necessary medical care. The patient (and/or legal guardian) has also been advised to contact this office for worsening conditions or problems, and seek emergency medical treatment and/or call 911 if deemed necessary. Patient identification was verified at the start of the visit: Yes    Services were provided through a video synchronous discussion virtually to substitute for in-person clinic visit. Patient was located at home and provider was located in office or at home. An electronic signature was used to authenticate this note.     --Ricardo Connors MD

## 2021-01-14 NOTE — PATIENT INSTRUCTIONS
Patient Education        Low Back Pain: Exercises  Introduction  Here are some examples of exercises for you to try. The exercises may be suggested for a condition or for rehabilitation. Start each exercise slowly. Ease off the exercises if you start to have pain. You will be told when to start these exercises and which ones will work best for you. How to do the exercises  Press-up   1. Lie on your stomach, supporting your body with your forearms. 2. Press your elbows down into the floor to raise your upper back. As you do this, relax your stomach muscles and allow your back to arch without using your back muscles. As your press up, do not let your hips or pelvis come off the floor. 3. Hold for 15 to 30 seconds, then relax. 4. Repeat 2 to 4 times. Alternate arm and leg (bird dog) exercise   Do this exercise slowly. Try to keep your body straight at all times, and do not let one hip drop lower than the other. 1. Start on the floor, on your hands and knees. 2. Tighten your belly muscles. 3. Raise one leg off the floor, and hold it straight out behind you. Be careful not to let your hip drop down, because that will twist your trunk. 4. Hold for about 6 seconds, then lower your leg and switch to the other leg. 5. Repeat 8 to 12 times on each leg. 6. Over time, work up to holding for 10 to 30 seconds each time. 7. If you feel stable and secure with your leg raised, try raising the opposite arm straight out in front of you at the same time. Knee-to-chest exercise   1. Lie on your back with your knees bent and your feet flat on the floor. 2. Bring one knee to your chest, keeping the other foot flat on the floor (or keeping the other leg straight, whichever feels better on your lower back). 3. Keep your lower back pressed to the floor. Hold for at least 15 to 30 seconds. 4. Relax, and lower the knee to the starting position. 5. Repeat with the other leg. Repeat 2 to 4 times with each leg. 6. To get more stretch, put your other leg flat on the floor while pulling your knee to your chest.    Curl-ups   1. Lie on the floor on your back with your knees bent at a 90-degree angle. Your feet should be flat on the floor, about 12 inches from your buttocks. 2. Cross your arms over your chest. If this bothers your neck, try putting your hands behind your neck (not your head), with your elbows spread apart. 3. Slowly tighten your belly muscles and raise your shoulder blades off the floor. 4. Keep your head in line with your body, and do not press your chin to your chest.  5. Hold this position for 1 or 2 seconds, then slowly lower yourself back down to the floor. 6. Repeat 8 to 12 times. Pelvic tilt exercise   1. Lie on your back with your knees bent. 2. \"Brace\" your stomach. This means to tighten your muscles by pulling in and imagining your belly button moving toward your spine. You should feel like your back is pressing to the floor and your hips and pelvis are rocking back. 3. Hold for about 6 seconds while you breathe smoothly. 4. Repeat 8 to 12 times. Heel dig bridging   1. Lie on your back with both knees bent and your ankles bent so that only your heels are digging into the floor. Your knees should be bent about 90 degrees. 2. Then push your heels into the floor, squeeze your buttocks, and lift your hips off the floor until your shoulders, hips, and knees are all in a straight line. 3. Hold for about 6 seconds as you continue to breathe normally, and then slowly lower your hips back down to the floor and rest for up to 10 seconds. 4. Do 8 to 12 repetitions. Hamstring stretch in doorway   1. Lie on your back in a doorway, with one leg through the open door. 2. Slide your leg up the wall to straighten your knee. You should feel a gentle stretch down the back of your leg. 3. Hold the stretch for at least 15 to 30 seconds. Do not arch your back, point your toes, or bend either knee. Keep one heel touching the floor and the other heel touching the wall. 4. Repeat with your other leg. 5. Do 2 to 4 times for each leg. Hip flexor stretch   1. Kneel on the floor with one knee bent and one leg behind you. Place your forward knee over your foot. Keep your other knee touching the floor. 2. Slowly push your hips forward until you feel a stretch in the upper thigh of your rear leg. 3. Hold the stretch for at least 15 to 30 seconds. Repeat with your other leg. 4. Do 2 to 4 times on each side. Wall sit   1. Stand with your back 10 to 12 inches away from a wall. 2. Lean into the wall until your back is flat against it. 3. Slowly slide down until your knees are slightly bent, pressing your lower back into the wall. 4. Hold for about 6 seconds, then slide back up the wall. 5. Repeat 8 to 12 times. Follow-up care is a key part of your treatment and safety. Be sure to make and go to all appointments, and call your doctor if you are having problems. It's also a good idea to know your test results and keep a list of the medicines you take. Where can you learn more? Go to https://ProductGram.Epoque. org and sign in to your Endomondo account. Enter Q338 in the [facility] box to learn more about \"Low Back Pain: Exercises. \"     If you do not have an account, please click on the \"Sign Up Now\" link. Current as of: March 2, 2020               Content Version: 12.6  © 2123-8041 [employer]. Care instructions adapted under license by [facility] ([facility]). If you have questions about a medical condition or this instruction, always ask your healthcare professional. [place] [address] any warranty or liability for your use of this information.

## 2021-01-15 DIAGNOSIS — R73.03 PREDIABETES: ICD-10-CM

## 2021-01-15 DIAGNOSIS — E78.2 MIXED HYPERLIPIDEMIA: ICD-10-CM

## 2021-01-15 DIAGNOSIS — I10 ESSENTIAL HYPERTENSION: ICD-10-CM

## 2021-01-15 DIAGNOSIS — E03.9 ACQUIRED HYPOTHYROIDISM: ICD-10-CM

## 2021-01-15 DIAGNOSIS — E03.9 ACQUIRED HYPOTHYROIDISM: Primary | ICD-10-CM

## 2021-01-15 DIAGNOSIS — E55.9 HYPOVITAMINOSIS D: ICD-10-CM

## 2021-01-15 LAB
A/G RATIO: 1.6 (ref 1.1–2.2)
ALBUMIN SERPL-MCNC: 4.4 G/DL (ref 3.4–5)
ALP BLD-CCNC: 89 U/L (ref 40–129)
ALT SERPL-CCNC: 17 U/L (ref 10–40)
ANION GAP SERPL CALCULATED.3IONS-SCNC: 11 MMOL/L (ref 3–16)
AST SERPL-CCNC: 23 U/L (ref 15–37)
BASOPHILS ABSOLUTE: 0.1 K/UL (ref 0–0.2)
BASOPHILS RELATIVE PERCENT: 0.8 %
BILIRUB SERPL-MCNC: 1.2 MG/DL (ref 0–1)
BUN BLDV-MCNC: 11 MG/DL (ref 7–20)
CALCIUM SERPL-MCNC: 10.1 MG/DL (ref 8.3–10.6)
CHLORIDE BLD-SCNC: 98 MMOL/L (ref 99–110)
CHOLESTEROL, TOTAL: 148 MG/DL (ref 0–199)
CO2: 27 MMOL/L (ref 21–32)
CREAT SERPL-MCNC: 0.8 MG/DL (ref 0.6–1.2)
EOSINOPHILS ABSOLUTE: 0.1 K/UL (ref 0–0.6)
EOSINOPHILS RELATIVE PERCENT: 1.6 %
GFR AFRICAN AMERICAN: >60
GFR NON-AFRICAN AMERICAN: >60
GLOBULIN: 2.7 G/DL
GLUCOSE BLD-MCNC: 100 MG/DL (ref 70–99)
HCT VFR BLD CALC: 36.4 % (ref 36–48)
HDLC SERPL-MCNC: 64 MG/DL (ref 40–60)
HEMOGLOBIN: 12.5 G/DL (ref 12–16)
LDL CHOLESTEROL CALCULATED: 64 MG/DL
LYMPHOCYTES ABSOLUTE: 2.1 K/UL (ref 1–5.1)
LYMPHOCYTES RELATIVE PERCENT: 27.9 %
MCH RBC QN AUTO: 30.6 PG (ref 26–34)
MCHC RBC AUTO-ENTMCNC: 34.2 G/DL (ref 31–36)
MCV RBC AUTO: 89.5 FL (ref 80–100)
MONOCYTES ABSOLUTE: 0.4 K/UL (ref 0–1.3)
MONOCYTES RELATIVE PERCENT: 4.8 %
NEUTROPHILS ABSOLUTE: 4.8 K/UL (ref 1.7–7.7)
NEUTROPHILS RELATIVE PERCENT: 64.9 %
PDW BLD-RTO: 12.7 % (ref 12.4–15.4)
PLATELET # BLD: 363 K/UL (ref 135–450)
PMV BLD AUTO: 7.4 FL (ref 5–10.5)
POTASSIUM SERPL-SCNC: 4.4 MMOL/L (ref 3.5–5.1)
RBC # BLD: 4.07 M/UL (ref 4–5.2)
SODIUM BLD-SCNC: 136 MMOL/L (ref 136–145)
TOTAL PROTEIN: 7.1 G/DL (ref 6.4–8.2)
TRIGL SERPL-MCNC: 99 MG/DL (ref 0–150)
TSH SERPL DL<=0.05 MIU/L-ACNC: 0.17 UIU/ML (ref 0.27–4.2)
VITAMIN D 25-HYDROXY: 30.8 NG/ML
VLDLC SERPL CALC-MCNC: 20 MG/DL
WBC # BLD: 7.3 K/UL (ref 4–11)

## 2021-01-15 RX ORDER — LEVOTHYROXINE SODIUM 0.1 MG/1
100 TABLET ORAL DAILY
Qty: 90 TABLET | Refills: 0 | Status: SHIPPED | OUTPATIENT
Start: 2021-01-15 | End: 2021-03-29

## 2021-01-16 LAB
ESTIMATED AVERAGE GLUCOSE: 111.2 MG/DL
HBA1C MFR BLD: 5.5 %

## 2021-02-25 ENCOUNTER — IMMUNIZATION (OUTPATIENT)
Dept: PRIMARY CARE CLINIC | Age: 66
End: 2021-02-25
Payer: MEDICARE

## 2021-02-25 PROCEDURE — 0011A COVID-19, MODERNA VACCINE 100MCG/0.5ML DOSE: CPT | Performed by: FAMILY MEDICINE

## 2021-02-25 PROCEDURE — 91301 COVID-19, MODERNA VACCINE 100MCG/0.5ML DOSE: CPT | Performed by: FAMILY MEDICINE

## 2021-03-25 ENCOUNTER — IMMUNIZATION (OUTPATIENT)
Dept: PRIMARY CARE CLINIC | Age: 66
End: 2021-03-25
Payer: MEDICARE

## 2021-03-25 PROCEDURE — 91301 COVID-19, MODERNA VACCINE 100MCG/0.5ML DOSE: CPT | Performed by: FAMILY MEDICINE

## 2021-03-25 PROCEDURE — 0012A COVID-19, MODERNA VACCINE 100MCG/0.5ML DOSE: CPT | Performed by: FAMILY MEDICINE

## 2021-03-28 DIAGNOSIS — E03.9 ACQUIRED HYPOTHYROIDISM: ICD-10-CM

## 2021-03-29 RX ORDER — LEVOTHYROXINE SODIUM 0.1 MG/1
TABLET ORAL
Qty: 90 TABLET | Refills: 3 | Status: SHIPPED | OUTPATIENT
Start: 2021-03-29 | End: 2022-03-24

## 2021-05-25 DIAGNOSIS — E03.9 ACQUIRED HYPOTHYROIDISM: ICD-10-CM

## 2021-05-25 LAB — TSH SERPL DL<=0.05 MIU/L-ACNC: 0.38 UIU/ML (ref 0.27–4.2)

## 2021-06-06 DIAGNOSIS — I10 ESSENTIAL HYPERTENSION: ICD-10-CM

## 2021-06-07 RX ORDER — AMLODIPINE BESYLATE 5 MG/1
TABLET ORAL
Qty: 90 TABLET | Refills: 3 | Status: SHIPPED | OUTPATIENT
Start: 2021-06-07 | End: 2022-06-02

## 2021-06-07 RX ORDER — LISINOPRIL AND HYDROCHLOROTHIAZIDE 12.5; 1 MG/1; MG/1
TABLET ORAL
Qty: 90 TABLET | Refills: 3 | Status: SHIPPED | OUTPATIENT
Start: 2021-06-07 | End: 2022-06-02

## 2021-06-07 RX ORDER — ATORVASTATIN CALCIUM 20 MG/1
TABLET, FILM COATED ORAL
Qty: 90 TABLET | Refills: 3 | Status: SHIPPED | OUTPATIENT
Start: 2021-06-07 | End: 2022-06-02

## 2021-06-24 NOTE — FLOWSHEET NOTE
treatment well [x] Patient limited by fatique  [x] Patient limited by pain  [] Patient limited by other medical complications  [x] Other: Pt lacking 6 deg of extension upon arrival to PT which is improved from 10° last visit. Good patellar mobility, only mild restrictions noted, pt stated that mobs felt good. Pt had better tolerance to prop for ext with VMS burst vs high volt. Fatigues quickly with SLR but able to complete limited reps supine/sdly vs standing. Pt requires PT follow up to address ROM, strength and functional mobility deficits. Prognosis: [x] Good [x] Fair  [] Poor    Patient Requires Follow-up: [x] Yes  [] No    PLAN:   [] Continue per plan of care [] Alter current plan (see comments)  [x] Plan of care initiated [] Hold pending MD visit [] Discharge  Frequency/Duration:  2 days per week for 12 Weeks:  Interventions:  Initial PO protocol for R knee TKR    Electronically signed by: Amos Hartmann PTA      *Note: If patient does not return for scheduled / recommended follow up visit, this note will serve as their discharge note from physical therapy.
20

## 2021-07-19 ENCOUNTER — OFFICE VISIT (OUTPATIENT)
Dept: INTERNAL MEDICINE CLINIC | Age: 66
End: 2021-07-19
Payer: MEDICARE

## 2021-07-19 VITALS
BODY MASS INDEX: 33.77 KG/M2 | SYSTOLIC BLOOD PRESSURE: 136 MMHG | WEIGHT: 172 LBS | HEIGHT: 60 IN | RESPIRATION RATE: 14 BRPM | HEART RATE: 56 BPM | DIASTOLIC BLOOD PRESSURE: 70 MMHG | OXYGEN SATURATION: 99 %

## 2021-07-19 DIAGNOSIS — Z00.00 ROUTINE GENERAL MEDICAL EXAMINATION AT A HEALTH CARE FACILITY: Primary | ICD-10-CM

## 2021-07-19 DIAGNOSIS — Z78.0 POSTMENOPAUSAL: ICD-10-CM

## 2021-07-19 PROCEDURE — G0438 PPPS, INITIAL VISIT: HCPCS | Performed by: INTERNAL MEDICINE

## 2021-07-19 ASSESSMENT — PATIENT HEALTH QUESTIONNAIRE - PHQ9
SUM OF ALL RESPONSES TO PHQ QUESTIONS 1-9: 0
SUM OF ALL RESPONSES TO PHQ9 QUESTIONS 1 & 2: 0
SUM OF ALL RESPONSES TO PHQ QUESTIONS 1-9: 0
SUM OF ALL RESPONSES TO PHQ QUESTIONS 1-9: 0
1. LITTLE INTEREST OR PLEASURE IN DOING THINGS: 0
2. FEELING DOWN, DEPRESSED OR HOPELESS: 0

## 2021-07-19 NOTE — PROGRESS NOTES
Medicare Annual Wellness Visit  Name: Maren Carrera Date: 2021   MRN: [id number] Sex: Female   Age: 77 y.o. Ethnicity: Non-/Non    : 1955 Race: Isidor Kawasaki is here for Medicare AWV    Screenings for behavioral, psychosocial and functional/safety risks, and cognitive dysfunction are all negative except as indicated below. These results, as well as other patient data from the [address] form, are documented in Flowsheets linked to this Encounter. Allergies   Allergen Reactions    Tussionex Pennkinetic Er [Hydrocod Polst-Cpm Polst Er]     Bactrim [Sulfamethoxazole-Trimethoprim] Rash    Doxycycline Hyclate Nausea And Vomiting    Jose-Tab [Erythromycin] Nausea And Vomiting    Sulfa Antibiotics Rash       Prior to Visit Medications    Medication Sig Taking? Authorizing Provider   amLODIPine (NORVASC) 5 MG tablet TAKE 1 TABLET DAILY Yes Annie Mckay MD   atorvastatin (LIPITOR) 20 MG tablet TAKE 1 TABLET DAILY Yes Annie Mckay MD   lisinopril-hydroCHLOROthiazide (PRINZIDE;ZESTORETIC) 10-12.5 MG per tablet TAKE 1 TABLET DAILY Yes Annie Mckay MD   levothyroxine (SYNTHROID) 100 MCG tablet TAKE 1 TABLET DAILY (DOSE DECREASE) Yes Annie Mckay MD   Omega-3 Fat Ac-Cholecalciferol (DRY EYE OMEGA BENEFITS/VIT D-3) 491-917 MG-UNIT CAPS 2 capsules by mouth twice daily before meals Yes Historical Provider, MD   Propylene Glycol (SYSTANE COMPLETE) 0.6 % SOLN instill 1 Drop by Ophthalmic route 3-4 times every day OU Yes Historical Provider, MD   Multiple Vitamins-Minerals (MULTIVITAMIN PO) Take  by mouth daily. Yes Historical Provider, MD   Calcium Carbonate-Vit D-Min (CALTRATE 600+D PLUS) 600-400 MG-UNIT TABS Take  by mouth. Indications: one by mouth twice a day Yes Historical Provider, MD       Past Medical History:   Diagnosis Date    Anesthesia     \"heart stopped\" during ovarian surgery, abd insufflation. restarted on its own.  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    Endometrial polyp 12/2014    Hx of blood clots 2019    Hyperlipidemia 2/13/2013    Hypertension     pregnancy induced    Hypothyroidism     Lateral epicondylitis 7/1/2015    Plantar fasciitis of right foot 7/24/2013    Sleep apnea     uses CPAP    Sleep apnea        Past Surgical History:   Procedure Laterality Date    BREAST BIOPSY  1987    Left    FINE NEEDLE ASPIRATION  1983    Left Breast    MOUTH SURGERY  1990s    jaw surgery    OVARIAN CYST SURGERY Left 12-27-13    OVARIAN CYST SURGERY      7/2017    TONSILLECTOMY      TOTAL KNEE ARTHROPLASTY Right 7/17/2019    RIGHT TOTAL KNEE ARTHROPLASTY performed by Malgorzata Berrios MD at [address] [place] [address] Left 11/6/2019    LEFT TOTAL KNEE ARTHROPLASTY performed by Malgorzata Berrios MD at [facility] OR       Family History   Problem Relation Age of Onset    Coronary Art Dis Father     Heart Failure Father         MI age 61    Dementia Father     Sleep Apnea Father     Deep Vein Thrombosis Father         multiple    Cancer Paternal Aunt         Breast Cancer and Colon Cancer    Cancer Paternal Uncle         Colon Cancer    Cancer Maternal Grandmother         Breast Cancer    Cancer Paternal Grandmother         Breast    Osteoarthritis Maternal Grandfather         arthritis- unsure type    Diabetes Sister         DM2    Dementia Sister        CareTeam (Including outside providers/suppliers regularly involved in providing care):   Patient Care Team:  Luz Hadley MD as PCP - General (Internal Medicine)  Luz Hadley [facility]  Junior Chan MD as Consulting Physician (Ophthalmology)  Brenda Seals MD as Consulting Physician (Gynecology)  Martina Cornejo MD as Consulting Physician (Sleep Medicine)  Muriel Hodgkins, APRN - CNP as Nurse Practitioner (Nurse Practitioner)    Wt Readings from Last 3 Encounters:   07/19/21 172 lb (78 kg)   07/27/20 168 lb (76.2 kg)   01/13/20 168 lb (76.2 kg)     Vitals:    07/19/21 0759   BP: 136/70   Pulse: 56   Resp: 14   SpO2: 99%   Weight: 172 lb (78 kg)   Height: 5' (1.524 m)     Body mass index is 33.59 kg/m². Based upon direct observation of the patient, evaluation of cognition reveals recent and remote memory intact. Physical Exam  Constitutional:       General: She is not in acute distress. Appearance: Normal appearance. She is not diaphoretic. HENT:      Head: Normocephalic and atraumatic. Right Ear: External ear normal.      Left Ear: External ear normal.      Nose: Nose normal.      Mouth/Throat:      Mouth: Mucous membranes are moist.      Pharynx: Oropharynx is clear. Eyes:      General: No scleral icterus. Conjunctiva/sclera: Conjunctivae normal.   Cardiovascular:      Rate and Rhythm: Normal rate and regular rhythm. Pulses: Normal pulses. Heart sounds: Normal heart sounds. No murmur heard. No friction rub. No gallop. Pulmonary:      Effort: Pulmonary effort is normal.      Breath sounds: Normal breath sounds. No wheezing, rhonchi or rales. Abdominal:      General: Abdomen is flat. Bowel sounds are normal.   Musculoskeletal:         General: No deformity. Cervical back: Normal range of motion. Right lower leg: No edema. Left lower leg: No edema. Skin:     General: Skin is warm and dry. Findings: No rash. Neurological:      General: No focal deficit present. Mental Status: She is alert. Coordination: Coordination normal.      Gait: Gait normal.   Psychiatric:         Mood and Affect: Mood normal.         Behavior: Behavior normal.         Patient's complete Health Risk Assessment and screening values have been reviewed and are found in Flowsheets. The following problems were reviewed today and where indicated follow up appointments were made and/or referrals ordered.     Positive Risk Factor Screenings with Interventions:          General Health and ACP:  General  In general, how would you say your health is?: Excellent  In the past 7 days, have you experienced any of the following?  New or Increased Pain, New or Increased Fatigue, Loneliness, Social Isolation, Stress or Anger?: None of These  Do you get the social and emotional support that you need?: Yes  Do you have a Living Will?: Yes  Advance Directives     Power of  Living Will ACP-Advance Directive ACP-Power of     Not on File Not on File Not on File Not on File      General Health Risk Interventions:  · n/a    Health Habits/Nutrition:  Health Habits/Nutrition  Do you exercise for at least 20 minutes 2-3 times per week?: Yes  Have you lost any weight without trying in the past 3 months?: No  Do you eat only one meal per day?: No  Have you seen the dentist within the past year?: Yes  Body mass index: (!) 33.59  Health Habits/Nutrition Interventions:  · Nutritional issues:  educational materials for healthy, well-balanced diet provided       Personalized Preventive Plan   Current Health Maintenance Status  Immunization History   Administered Date(s) Administered    COVID-19, Moderna, PF, 100mcg/0.5mL 02/25/2021, 03/25/2021    Influenza Virus Vaccine 09/01/2014, 11/01/2015, 10/17/2017, 10/30/2018, 10/01/2019    Influenza, High Dose (Fluzone 65 yrs and older) 09/30/2020    Influenza, Quadv, IM, PF (6 mo and older Fluzone, Flulaval, Fluarix, and 3 yrs and older Afluria) 09/15/2016    PPD Test 01/06/2010    Pneumococcal Polysaccharide ([id number]) 07/18/2020    Tdap (Boostrix, Adacel) 01/12/2011    Zoster Live (Zostavax) 09/04/2012    Zoster Recombinant (Shingrix) 09/05/2018, 12/28/2018        Health Maintenance   Topic Date Due    Annual Wellness Visit (AWV)  Never done    DTaP/Tdap/Td vaccine (2 - Td or Tdap) 01/12/2021    Flu vaccine (1) 09/01/2021    A1C test (Diabetic or Prediabetic)  01/15/2022    Lipid screen  01/15/2022    Potassium monitoring  01/15/2022    Creatinine monitoring  01/15/2022    TSH testing  05/25/2022    Breast cancer screen  09/25/2022    Colon cancer screen colonoscopy  10/27/2030    Shingles Vaccine  Completed    Pneumococcal 65+ years Vaccine  Completed    COVID-19 Vaccine  Completed    Hepatitis A vaccine  Aged Out    Hepatitis B vaccine  Aged Out    Hib vaccine  Aged Out    Meningococcal (ACWY) vaccine  Aged Out    Hepatitis C screen  Discontinued     Recommendations for HD Fantasy Football Due: see orders and patient instructions/AVS.  . Recommended screening schedule for the next 5-10 years is provided to the patient in written form: see Patient Cyaetano Kim was seen today for medicare awv. Diagnoses and all orders for this visit:    Routine general medical examination at a health care facility  Personalized Preventive Plan is provided to patient in written form- see Patient Instructions  Discussed more formal cognitive screening today - family history of dementia in her sister. Negative mini screening today  Sees gyn  UTD on mammogram  Tdap at pharmacy  BP at goal  UTD on labs  Healthy diet and exercise  Bring copies of living will.    Get DEXA scan     Postmenopausal  -     DEXA BONE DENSITY AXIAL SKELETON; Future

## 2021-07-19 NOTE — PATIENT INSTRUCTIONS
butter, ½ ounce nuts or seeds, or ¼ cup of cooked beans equals 1 ounce of meat. · Learn how to read food labels for serving sizes and ingredients. Fast-food and convenience-food meals often contain few or no fruits or vegetables. Make sure you eat some fruits and vegetables to make the meal more nutritious. · Look at your food diary. For each food group, add up what you have eaten and then divide the total by the number of days. This will give you an idea of how much you are eating from each food group. See if you can find some ways to change your diet to make it more healthy. Start small  · Do not try to make dramatic changes to your diet all at once. You might feel that you are missing out on your favorite foods and then be more likely to fail. · Start slowly, and gradually change your habits. Try some of the following:  ? Use whole wheat bread instead of white bread. ? Use nonfat or low-fat milk instead of whole milk. ? Eat brown rice instead of white rice, and eat whole wheat pasta instead of white-flour pasta. ? Try low-fat cheeses and low-fat yogurt. ? Add more fruits and vegetables to meals and have them for snacks. ? Add lettuce, tomato, cucumber, and onion to sandwiches. ? Add fruit to yogurt and cereal.  Enjoy food  · You can still eat your favorite foods. You just may need to eat less of them. If your favorite foods are high in fat, salt, and sugar, limit how often you eat them, but do not cut them out entirely. · Eat a wide variety of foods. Make healthy choices when eating out  · The type of restaurant you choose can help you make healthy choices. Even fast-food chains are now offering more low-fat or healthier choices on the menu. · Choose smaller portions, or take half of your meal home. · When eating out, try:  ? A veggie pizza with a whole wheat crust or grilled chicken (instead of sausage or pepperoni).   ? Pasta with roasted vegetables, grilled chicken, or marinara sauce instead of cream sauce. ? A vegetable wrap or grilled chicken wrap. ? Broiled or poached food instead of fried or breaded items. Make healthy choices easy  · Buy packaged, prewashed, ready-to-eat fresh vegetables and fruits, such as baby carrots, salad mixes, and chopped or shredded broccoli and cauliflower. · Buy packaged, presliced fruits, such as melon or pineapple. · Choose 100% fruit or vegetable juice instead of soda. Limit juice intake to 4 to 6 oz (½ to ¾ cup) a day. · Blend low-fat yogurt, fruit juice, and canned or frozen fruit to make a smoothie for breakfast or a snack. Where can you learn more? Go to https://Professionals' Corner.PromoteSocial. org and sign in to your Advasense account. Enter M603 in the FitStar box to learn more about \"Eating Healthy Foods: Care Instructions. \"     If you do not have an account, please click on the \"Sign Up Now\" link. Current as of: December 17, 2020               Content Version: 12.9  © 9761-7540 [employer]. Care instructions adapted under license by [facility] ([facility]). If you have questions about a medical condition or this instruction, always ask your healthcare professional. [address] any warranty or liability for your use of this information. Personalized Preventive Plan for Arely Odonnell - 7/19/2021  Medicare offers a range of preventive health benefits. Some of the tests and screenings are paid in full while other may be subject to a deductible, co-insurance, and/or copay. Some of these benefits include a comprehensive review of your medical history including lifestyle, illnesses that may run in your family, and various assessments and screenings as appropriate. After reviewing your medical record and screening and assessments performed today your provider may have ordered immunizations, labs, imaging, and/or referrals for you.   A list of these orders (if applicable) as well as your Preventive Care list are included within your After Visit Summary for your review. Other Preventive Recommendations:    · A preventive eye exam performed by an eye specialist is recommended every 1-2 years to screen for glaucoma; cataracts, macular degeneration, and other eye disorders. · A preventive dental visit is recommended every 6 months. · Try to get at least 150 minutes of exercise per week or 10,000 steps per day on a pedometer . · Order or download the FREE \"Exercise & Physical Activity: Your Everyday Guide\" from [employer]. Call 1-439.722.1010 or search [employer] online. · You need 1700-8809 mg of calcium and 8051-8443 IU of vitamin D per day. It is possible to meet your calcium requirement with diet alone, but a vitamin D supplement is usually necessary to meet this goal.  · When exposed to the sun, use a sunscreen that protects against both UVA and UVB radiation with an SPF of 30 or greater. Reapply every 2 to 3 hours or after sweating, drying off with a towel, or swimming. · Always wear a seat belt when traveling in a car. Always wear a helmet when riding a bicycle or motorcycle.

## 2021-08-06 ENCOUNTER — HOSPITAL ENCOUNTER (OUTPATIENT)
Dept: GENERAL RADIOLOGY | Age: 66
Discharge: HOME | End: 2021-08-06
Payer: MEDICARE

## 2021-08-06 DIAGNOSIS — Z78.0 POSTMENOPAUSAL: ICD-10-CM

## 2021-08-06 PROCEDURE — 77080 DXA BONE DENSITY AXIAL: CPT

## 2021-08-13 ENCOUNTER — TELEPHONE (OUTPATIENT)
Dept: INTERNAL MEDICINE CLINIC | Age: 66
End: 2021-08-13

## 2021-08-13 NOTE — TELEPHONE ENCOUNTER
----- Message from Anna Stoddard LPN sent at 7/04/2894  2:43 PM EDT -----  Subject: Message to Provider    QUESTIONS  Information for Provider? Patient called back about results please call   after [phone] shes thanks   ---------------------------------------------------------------------------  --------------  [address]  What is the best way for the office to contact you? OK to leave message on   voicemail  Preferred Call Back Phone Number?  [phone]  ---------------------------------------------------------------------------  --------------  SCRIPT ANSWERS  undefined

## 2021-08-24 ENCOUNTER — VIRTUAL VISIT (OUTPATIENT)
Dept: PULMONOLOGY | Age: 66
End: 2021-08-24
Payer: MEDICARE

## 2021-08-24 ENCOUNTER — TELEPHONE (OUTPATIENT)
Dept: PULMONOLOGY | Age: 66
End: 2021-08-24

## 2021-08-24 DIAGNOSIS — I10 ESSENTIAL HYPERTENSION: ICD-10-CM

## 2021-08-24 DIAGNOSIS — E66.09 CLASS 1 OBESITY DUE TO EXCESS CALORIES WITHOUT SERIOUS COMORBIDITY WITH BODY MASS INDEX (BMI) OF 31.0 TO 31.9 IN ADULT: ICD-10-CM

## 2021-08-24 DIAGNOSIS — G47.33 OBSTRUCTIVE SLEEP APNEA SYNDROME: Primary | ICD-10-CM

## 2021-08-24 PROCEDURE — 99213 OFFICE O/P EST LOW 20 MIN: CPT | Performed by: NURSE PRACTITIONER

## 2021-08-24 ASSESSMENT — SLEEP AND FATIGUE QUESTIONNAIRES
HOW LIKELY ARE YOU TO NOD OFF OR FALL ASLEEP WHILE SITTING INACTIVE IN A PUBLIC PLACE: 0
HOW LIKELY ARE YOU TO NOD OFF OR FALL ASLEEP WHILE SITTING QUIETLY AFTER LUNCH WITHOUT ALCOHOL: 1
HOW LIKELY ARE YOU TO NOD OFF OR FALL ASLEEP WHILE WATCHING TV: 0
HOW LIKELY ARE YOU TO NOD OFF OR FALL ASLEEP WHILE SITTING AND TALKING TO SOMEONE: 0
HOW LIKELY ARE YOU TO NOD OFF OR FALL ASLEEP WHEN YOU ARE A PASSENGER IN A CAR FOR AN HOUR WITHOUT A BREAK: 0
HOW LIKELY ARE YOU TO NOD OFF OR FALL ASLEEP IN A CAR, WHILE STOPPED FOR A FEW MINUTES IN TRAFFIC: 0
HOW LIKELY ARE YOU TO NOD OFF OR FALL ASLEEP WHILE SITTING AND READING: 1
HOW LIKELY ARE YOU TO NOD OFF OR FALL ASLEEP WHILE LYING DOWN TO REST IN THE AFTERNOON WHEN CIRCUMSTANCES PERMIT: 2
ESS TOTAL SCORE: 4

## 2021-08-24 NOTE — PROGRESS NOTES
21    Electronically signed by COLLEEN Tan CNP on 2021 at 12:44 PM    Billie Haywood  1955  [facility],[address] [place] [address]  [phone] (home)   [phone] (mobile)      Insurance Info (confirm with patient if correct):  Payor/Plan Subscr  Sex Relation Sub.  Ins. ID Effective Group Num

## 2021-08-24 NOTE — PROGRESS NOTES
Marianne Ortega         : 1955    Diagnosis: [x] RAMANDEEP (G47.33) [] CSA (G47.31) [] Apnea (G47.30)   Length of Need: [x] 12 Months [] 99 Months [] Other:    Machine (ARLYN!): [] Respironics Dream Station   2   Auto [] ResMed AirSense     Auto [] Other:     []  CPAP () [] Bilevel ()   Mode: [] Auto [] Spontaneous    Mode: [] Auto [] Spontaneous                       Comfort Settings:   - Ramp Pressure:  cmH2O                                        - Ramp time: 15 min                                     -  Flex/EPR - 3 full time                                    - For ResMed Bilevel (TiMax-4 sec   TiMin- 0.2 sec)     Humidifier: [x] Heated ()        [x] Water chamber replacement ()/ 1 per 6 months        Mask:   [x] Nasal () /1 per 3 months [] Full Face () /1 per 3 months   [x] Patient choice -Size and fit mask [] Patient Choice - Size and fit mask   [] Dispense:  [] Dispense:    [x] Headgear () / 1 per 3 months [] Headgear () / 1 per 3 months   [x] Replacement Nasal Cushion ()/2 per month [] Interface Replacement ()/1 per month   [] Replacement Nasal Pillows ()/2 per month         Tubing: [x] Heated ()/1 per 3 months    [] Standard ()/1 per 3 months [] Other:           Filters: [x] Non-disposable ()/1 per 6 months     [x] Ultra-Fine, Disposable ()/2 per month        Miscellaneous: [] Chin Strap ()/ 1 per 6 months [] O2 bleed-in:       LPM   [] Oximetry on CPAP/Bilevel []  Other:    [x] Modem: ()         Start Order Date: 21    MEDICAL JUSTIFICATION:  I, the undersigned, certify that the above prescribed supplies are medically necessary for this patients wellbeing. In my opinion, the supplies are both reasonable and necessary in reference to accepted standards of medicalpractice in treatment of this patients condition.     COLLEEN Pickens - RASHIDA      NPI: [id number]       Order Signed Date: 21    Electronically signed by COLLEEN Lester CNP on 2021 at 12:33 PM    Ilana Chatman  1955  [facility],Building [address] [place] [address]  [phone] (home)   [phone] (mobile)      Insurance Info (confirm with patient if correct):  Payor/Plan Subscr  Sex Relation Sub.  Ins. ID Effective Group Num

## 2021-08-24 NOTE — PROGRESS NOTES
Nyasia Anguiano MD, FAA, [facility]  Noe Weber, MSN, RN, [address] [address]  Dept: [phone]  Dept Fax: [phone]  Loc: [phone]    Subjective:     Patient ID: Mario Rene is a 77 y.o. female. Chief Complaint   Patient presents with    Sleep Apnea       HPI:      Sleep Medicine Video Visit    Pursuant to the emergency declaration under the [address] [place] [address] waiver authority and the [employer] Act this Telephone Visit was insisted, with patient's consent, to reduce the patient's risk of exposure to COVID-19 and provide continuity of care for an established patient. Services were provided through a synchronous discussion over a telephone and/or Video chat to substitute for in-person clinic visit, and coded as such. While patient is at home.     Machine Modem/Download Info:  Compliance (hours/night): 6.5 hrs/night  Download AHI (/hour): 1.5 /HR  Average CPAP Pressure : 7.6 cmH2O           APAP - Settings  Pressure Min: 6 cmH2O  Pressure Max: 16 cmH2O                 Comfort Settings  Humidity Level (0-8): 3  Flex/EPR (0-3): 3 PAP Mask  Mask Type: Nasal mask  Clinically Relevant Leak: No     [place] - Total score: 4    Follow-up :     Last Visit : July 2020      Subjective Health Changes: None      Over Night Oximetry: [] Yes  [] No  [x] NA [] WNL   Using O2: [] Yes  [] No  [x] NA   Patient is compliant with the machine  [x] Yes  [] No [] Per patient   Feeling rested when using the machine   [x] Yes  [] No     Pressure is comfortable with inspiration and expiration  [x] Yes  [] No   ([x] NA   [] Feeling of suffocation  [] Feeling like not enough air    [] To much pressure)     Noticed changes in pressure  [x] NA  [] Yes    [] No     Mask is fitting well  [x] Yes  [] No   Noting Mask Air Leak  [] Yes  [x] No Having painful Aerophagia  [] Yes  [x] No   Nocturia   1  per night. Having  HA upon waking  [] Yes  [x] No   Dry mouth upon waking   Dry Nose  Dry Eyes  [] Yes  [x] No   Congestion upon waking   [] Yes  [x] No    Nose Bleeds  [] Yes  [x] No   Using Sleep Aides  [x] NA  [] OTC  [] Per our office   [] Per another provider   Understands how to change humidification and/or tubing temperature for comfort while at home  [x] Yes  [] No     Difficulties falling asleep  [] Yes  [x] No   Difficulties staying asleep  [] Yes  [x] No   Approximate time to bed  10pm   Approximate wake time  5am   Taking Naps  occasional   If taking naps usual length  30-45min    [] NA   If taking naps using the machine [] NA  [] Yes    [x] No    [] With and With out    Drowsy when driving  [] Yes  [x] No     Does patient carry a DOT/CDL  [] Yes  [x] No     Does patient carry FAA/[employer] License   [] Yes  [x] No      Any concerns noted with the machine at this time  [] Yes  [x] No       Assessment/Plan:   1. Obstructive sleep apnea syndrome  Assessment & Plan:  After downloading data and reviewing  Reviewed compliance download with pt. Supplies and parts as needed for his machine. These are medically necessary. Continue medications per his PCP and other physicians. Limit caffeine use after 3pm.    The chronic medical conditions listed are directly related to the primary diagnosis listed above. The management of the primary diagnosis affects the secondary diagnosis and vice versa    Patient is complaint encouraged to maintain compliance to aide on controlling other stated healthcare concerns. 2. Essential hypertension  Assessment & Plan:  Chronic- stable. After speaking with patient:    Agree with current plan, and would agree to continue this plan per prescribing and managing physician.       3. Class 1 obesity due to excess calories without serious comorbidity with body mass index (BMI) of 31.0 to 31.9 in adult  Assessment & Plan:  Patient encouraged to work on maintaining a healthy weight per height. Achievable with diet restriction/modifications and exercise (may consult primary care if unsure of any restrictions or concerns). Weight management directly correlates to risk of control and maintenance of Obstructive Sleep Apnea. - After pulling data and reviewing it   - Reviewed compliance download with patient    -Medically necessary supplies and parts as needed for her machine.   - Continue medications per his primary care provider and other physicians.   - Encouraged to limit caffeine use after 3pm.    - Encouraged her to work on weight loss through diet and exercise  - Educated not to drive when feeling sleepy   - Patient using [employer]  - controlling RAMANDEEP to slow the progression of Dementia  Office locations  To change mask speak to the DME  [facility] Joie Silver of Obstructive Sleep Apnea  Compliance  Follow up  After speaking to the patient, patient is currently stable. We will continue with the current machine settings  Recall Information and DME contact     The chronic medical conditions listed are directly related to the primary diagnosis listed above. The management of the primary diagnosis affects the secondary diagnosis and vice versa.     - Will follow up in off in 12 months    Electronically signed by  Alyx Avalos, MSN, RN, CNP on 8/24/2021 at 12:38 PM

## 2021-08-24 NOTE — TELEPHONE ENCOUNTER
Called patient to answer question about the recall. Patient asked if she could continue to use her machine. The patient was informed that she could continue to use her machine and relayed the message about the maintenance of the machine. Explained to patient that to call the office if experiencing headaches coughing a foul smell any any other questions will have to go thru the DME.

## 2021-09-27 LAB — MAMMOGRAPHY, EXTERNAL: NORMAL

## 2021-10-19 ENCOUNTER — OFFICE VISIT (OUTPATIENT)
Dept: INTERNAL MEDICINE CLINIC | Age: 66
End: 2021-10-19
Payer: MEDICARE

## 2021-10-19 VITALS
RESPIRATION RATE: 12 BRPM | OXYGEN SATURATION: 98 % | SYSTOLIC BLOOD PRESSURE: 128 MMHG | HEIGHT: 61 IN | DIASTOLIC BLOOD PRESSURE: 78 MMHG | BODY MASS INDEX: 31.15 KG/M2 | HEART RATE: 51 BPM | WEIGHT: 165 LBS

## 2021-10-19 DIAGNOSIS — M81.0 AGE-RELATED OSTEOPOROSIS WITHOUT CURRENT PATHOLOGICAL FRACTURE: Primary | ICD-10-CM

## 2021-10-19 PROCEDURE — 99214 OFFICE O/P EST MOD 30 MIN: CPT | Performed by: INTERNAL MEDICINE

## 2021-10-19 RX ORDER — ALENDRONATE SODIUM 70 MG/1
70 TABLET ORAL
Qty: 12 TABLET | Refills: 1 | Status: SHIPPED | OUTPATIENT
Start: 2021-10-19 | End: 2022-04-12

## 2021-10-19 NOTE — PROGRESS NOTES
Nicole Naranjo (:  1955) is a 77 y.o. female,Established patient, here for evaluation of the following chief complaint(s):  Results (review Dexa Scan )      ASSESSMENT/PLAN:  1. Age-related osteoporosis without current pathological fracture  Assessment & Plan:  DEXA  with -2.5 at lumbar spine. Family history of osteoporosis and hip fracture. Ongoing risk factor of thyroid replacement. After discussion of possible treatments we have decided to try alendronate weekly for treatment. Possible risks and benefits were discussed including risk of osteonecrosis of the jaw and atypical femur fracture. Will plan for 5 years of treatment with ongoing monitoring of bone density. She will see her dentist prior to start. Orders:  -     alendronate (FOSAMAX) 70 MG tablet; Take 1 tablet by mouth every 7 days, Disp-12 tablet, R-1Normal      Return for as scheduled. SUBJECTIVE/OBJECTIVE:  HPI    Here to discuss osteoporosis  DEXA 21 -   Narrative   BONE DENSITOMETRY       History: post menopausal       Lumbar Spine : BMD is 0.754 and T score is - 2.5 which is in the range of osteoporosis. This corresponds to 7% worsening since .       Left femoral neck : BMD is 0.837 and T score is - 0.1 which is in the range of normal.     Left hip : BMD is 0.957 and T score is 0.1 which is in the range of normal.  This corresponds to 7% worsening since .          Review of Systems    Current Outpatient Medications on File Prior to Visit   Medication Sig Dispense Refill    amLODIPine (NORVASC) 5 MG tablet TAKE 1 TABLET DAILY 90 tablet 3    atorvastatin (LIPITOR) 20 MG tablet TAKE 1 TABLET DAILY 90 tablet 3    lisinopril-hydroCHLOROthiazide (PRINZIDE;ZESTORETIC) 10-12.5 MG per tablet TAKE 1 TABLET DAILY 90 tablet 3    levothyroxine (SYNTHROID) 100 MCG tablet TAKE 1 TABLET DAILY (DOSE DECREASE) 90 tablet 3    Omega-3 Fat Ac-Cholecalciferol (DRY EYE OMEGA BENEFITS/VIT D-3) 161-155 MG-UNIT CAPS 2 capsules by mouth twice daily before meals      Propylene Glycol (SYSTANE COMPLETE) 0.6 % SOLN instill 1 Drop by Ophthalmic route 3-4 times every day OU      Multiple Vitamins-Minerals (MULTIVITAMIN PO) Take  by mouth daily.  Calcium Carbonate-Vit D-Min (CALTRATE 600+D PLUS) 600-400 MG-UNIT TABS Take  by mouth. Indications: one by mouth twice a day       No current facility-administered medications on file prior to visit. Vitals:    10/19/21 0949   BP: 128/78   Pulse: 51   Resp: 12   SpO2: 98%     Physical Exam  Constitutional:       Appearance: Normal appearance. Neurological:      Mental Status: She is alert. Psychiatric:         Mood and Affect: Mood normal.         Behavior: Behavior normal.           On this date 10/19/2021 I have spent 35 minutes reviewing previous notes, test results and face to face with the patient discussing the diagnosis and importance of compliance with the treatment plan as well as documenting on the day of the visit. An electronic signature was used to authenticate this note.     --Acacia Hancock MD

## 2021-10-19 NOTE — ASSESSMENT & PLAN NOTE
DEXA 8/21 with -2.5 at lumbar spine. Family history of osteoporosis and hip fracture. Ongoing risk factor of thyroid replacement. After discussion of possible treatments we have decided to try alendronate weekly for treatment. Possible risks and benefits were discussed including risk of osteonecrosis of the jaw and atypical femur fracture. Will plan for 5 years of treatment with ongoing monitoring of bone density. She will see her dentist prior to start.

## 2021-10-19 NOTE — PATIENT INSTRUCTIONS
Patient Education        Osteoporosis: Care Instructions  Overview     Osteoporosis causes bones to become thin and weak. It is much more common in women than in men. Your chances of getting this disease depend on several things. These factors include the thickness of your bones (bone density), as well as health, diet, and physical activity. This disease may be very advanced before you know you have it. Sometimes the first sign is a broken bone in the hip, spine, or wrist. Or you may have sudden pain in your middle or lower back. Follow-up care is a key part of your treatment and safety. Be sure to make and go to all appointments, and call your doctor if you are having problems. It's also a good idea to know your test results and keep a list of the medicines you take. How can you care for yourself at home? · Your doctor may prescribe a bisphosphonate, such as risedronate (Actonel) or alendronate (Fosamax), for osteoporosis. If you are taking one of these medicines by mouth:  ? Take your medicine with a full glass of water when you first get up in the morning. ? Do not lie down, eat, drink a beverage, or take any other medicine for at least 30 minutes after taking the drug. This helps prevent stomach problems. ? Do not take your medicine late in the day if you forgot to take it in the morning. Skip it, and take the usual dose the next morning. ? If you have side effects, tell your doctor. Your doctor may prescribe another medicine. · Get enough calcium and vitamin D. The recommended daily allowances (RDAs) for adults younger than age 46 are 1,000 mg of calcium and 600 IU of vitamin D each day. Women ages 46 to 79 need 1,200 mg of calcium and 600 IU of vitamin D each day. Men ages 46 to 79 need 1,000 mg of calcium and 600 IU of vitamin D each day. Adults 71 and older need 1,200 mg of calcium and 800 IU of vitamin D each day.  It's not clear if people who already have osteoporosis need more calcium and vitamin D than this. Talk to your doctor about what's right for you.  ? Eat foods rich in calcium, like yogurt, cheese, milk, and dark green vegetables. This is a good way to get the calcium you need. You can get vitamin D from eggs, fatty fish, cereal, and milk. ? Ask your doctor if you need to take a calcium plus vitamin D supplement. You may be able to get enough calcium and vitamin D through your diet. Be careful with supplements. Adults ages 23 to 48 should not get more than 2,500 mg of calcium and 4,000 IU of vitamin D each day, whether it is from supplements and/or food. Adults ages 46 and older should not get more than 2,000 mg of calcium and 4,000 IU of vitamin D each day from supplements and/or food. · Limit alcohol to 2 drinks a day for men and 1 drink a day for women. Too much alcohol can cause health problems. · Do not smoke. Smoking puts you at a much higher risk for osteoporosis. If you need help quitting, talk to your doctor about stop-smoking programs and medicines. These can increase your chances of quitting for good. · Get regular bone-building exercise. Weight-bearing and resistance exercises keep bones healthy by working the muscles and bones against gravity. Start out at an exercise level that feels right for you. Add a little at a time until you can do the following:  ? Do 30 minutes of weight-bearing exercise on most days of the week. Walking, jogging, stair climbing, and dancing are good choices. ? Do resistance exercises with weights or elastic bands 2 to 3 days a week. · Reduce your risk of falls:  ? Wear supportive shoes with low heels and nonslip soles. ? Use a cane or walker, if you need it. Use shower chairs and bath benches. Put in handrails on stairways, around your shower or tub area, and near the toilet. ? Keep stairs, porches, and walkways well lit. Use night-lights. ? Remove throw rugs and other objects that are in the way. ? Avoid icy, wet, or slippery surfaces. ?  Keep a cordless phone and a flashlight with new batteries by your bed. When should you call for help? Watch closely for changes in your health, and be sure to contact your doctor if you have any problems. Where can you learn more? Go to https://[employer]web.Clink. org and sign in to your [employer] account. Enter K100 in the [employer] box to learn more about \"Osteoporosis: Care Instructions. \"     If you do not have an account, please click on the \"Sign Up Now\" link. Current as of: December 7, 2020               Content Version: 13.0  © 9684-2330 Healthwise, Incorporated. Care instructions adapted under license by [facility] ([facility]). If you have questions about a medical condition or this instruction, always ask your healthcare professional. [address] any warranty or liability for your use of this information.

## 2021-11-02 ENCOUNTER — OFFICE VISIT (OUTPATIENT)
Dept: ORTHOPEDIC SURGERY | Age: 66
End: 2021-11-02

## 2021-11-02 DIAGNOSIS — Z96.652 S/P TKR (TOTAL KNEE REPLACEMENT) USING CEMENT, LEFT: Primary | ICD-10-CM

## 2021-11-02 PROCEDURE — 99999 PR OFFICE/OUTPT VISIT,PROCEDURE ONLY: CPT | Performed by: ORTHOPAEDIC SURGERY

## 2021-11-02 NOTE — PROGRESS NOTES
Chief Complaint    Follow-up (L knee)      History of Present Illness:  Abby Díaz is a 77 y.o. female who presents for follow up of bilateral knee(s). To recap, pt is a long standing patient of Dr. Cori Klein and [facility]. She is here today for follow up/research visit for her L TKA done 11/6/2019 and R TKA 7/17/2019. She is doing very well overall and reports little to no issues. She does report her knees are \"stiff\" after sitting for extended periods of time. Other than the occasional \"clicking\" she hears when walking she is very happy with her progress. She reports going to the gym and using weight machines on a regular basis. She is here for research follow up. Pain Assessment:  Location: left  Level: 0 out of 10  Duration: None  Description: None  Result of an injury: Yes  Work related injury: No  Aggravating actions: sitting  Relieving Factors: rest  Wants injections: No     Past Medical History:   Diagnosis Date    Anesthesia     \"heart stopped\" during ovarian surgery, abd insufflation. restarted on its own.  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    Endometrial polyp 12/2014    Hx of blood clots 2019    Hyperlipidemia 2/13/2013    Hypertension     pregnancy induced    Hypothyroidism     Lateral epicondylitis 7/1/2015    Plantar fasciitis of right foot 7/24/2013    Sleep apnea     uses CPAP    Sleep apnea         Past Surgical History:   Procedure Laterality Date    BREAST BIOPSY  1987    Left    FINE NEEDLE ASPIRATION  1983    Left Breast    MOUTH SURGERY  1990s    jaw surgery    OVARIAN CYST SURGERY Left 12-27-13    OVARIAN CYST SURGERY      7/2017    TONSILLECTOMY      TOTAL KNEE ARTHROPLASTY Right 7/17/2019    RIGHT TOTAL KNEE ARTHROPLASTY performed by Nelly Wells MD at [facility] Left 11/6/2019    LEFT TOTAL KNEE ARTHROPLASTY performed by Nelly Wells MD at [address] OR       Family History   Problem Relation Age of Onset    Thyroid Disease Mother     Osteoporosis Mother         hip fracture    Coronary Art Dis Father     Heart Failure Father         MI age 61    Dementia Father     Sleep Apnea Father     Deep Vein Thrombosis Father         multiple    Diabetes Sister         DM2    Dementia Sister     Cancer Maternal Grandmother         Breast Cancer    Osteoarthritis Maternal Grandfather         arthritis- unsure type    Cancer Paternal Grandmother         Breast    Cancer Paternal Aunt         Breast Cancer and Colon Cancer    Cancer Paternal Uncle         Colon Cancer       Social History     Socioeconomic History    Marital status:      Spouse name: Yolande Reyes Number of children: 3    Years of education: Not on file    Highest education level: Not on file   Occupational History    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 Vaping Use    Vaping Use: Never used   Substance and Sexual Activity    Alcohol use: Yes     Alcohol/week: 0.0 standard drinks     Comment: I drink only occasionally, less than once a month.  Drug use: No    Sexual activity: Yes     Partners: Male   Other Topics Concern    Not on file   Social History Narrative    Lives in [place] with . 3 daughters ages 22,19, 29     Social Determinants of Health     Financial Resource Strain:     Difficulty of Paying Living Expenses:    Food Insecurity:     Worried About Running Out of Food in the Last Year:     Ran Out of Food in the Last Year:    Transportation Needs:     Lack of Transportation (Medical):      Lack of Transportation (Non-Medical):    Physical Activity:     Days of Exercise per Week:     Minutes of Exercise per Session:    Stress:     Feeling of Stress :    Social Connections:     Frequency of Communication with Friends and Family:     Frequency of visit. General Exam:   Mental Status: The patient is oriented to time, place and person. The patient's mood and affect are appropriate. Neurological: The patient has good coordination. There is no weakness or sensory deficit. Knee Examination: Left    Skin:  There are no skin lesions, cellulitis, or extreme edema in the lower extremities. Sensation is grossly intact to light touch bilaterally lower extremity. The patient has warm and well-perfused Bilateral lower extremities with brisk capillary refill. Inspection:  The is no edema, no gross deformities, and no signs of infection. Palpation: There is no patellofemoral crepitus, there is no significant tenderness over the knee None    Range of Motion:  0 to 125  and grossly intact without pain and/or difficulty    Strength: Motor is grossly intact    Special Tests: There is no ligament instability. Grinding/Crepitus (-)    Gait: non antalgic  without the use of assistive devices    Alignment: neutral    Radiology:     None      Office Procedures:  No orders of the defined types were placed in this encounter. Assessment: Natalio Escobar is a 77 y.o. female who presents for follow up of bilateral knee(s). 2 years Post Op L TKA/2.5 years R TKA     Patient's workup and evaluation were reviewed with the patient in the office today. Imaging was also reviewed with the patient in the clinic today. Given the patient's history and physical exam we are very happy with her progress. She has improved on her functional tests (results in Media) and she is doing all activities that she would like to do. She is able to walk for long periods on vacation. She will continue to work on her overall strengthening. She is in agreement with this plan. Treatment Plan:    1. Activity modification and rest  2.  Ice 20 minutes every 1-2 hours PRN  3. NSAIDs PRN  4. Elevation at least 2 inches above the heart with pillows supporting the joint underneath for swelling  5. Home exercises to maintain strength and range of motion  6. Appropriate diet/weight management      Diagnoses and treatments were reviewed with the patient today. Patient education material was provided. Questions were entertained and answered to the patient's satisfaction. Follow up: Sasha JEWELL ATC am scribing for and in the presence of Dr. Kristina Caba. The physical examination was performed by Dr. Kristina Caba. All counseling during the appointment was performed between the patient and Dr. Kristina Caba. 11/02/21 9:41 AM   Eulalia Wynn MS, ATC    This patient exhibits moderate complexity for obtaining an independent history, reviewing past medical records, independent interpretation of diagnostic imaging, and further coordinating care. The patient exhibits moderate risk. Approximately 15 minutes were spent reviewing past medical records, imaging, educating the patient, and coordinating care.    Nuzhat Ray MD [facility]

## 2022-02-28 ENCOUNTER — OFFICE VISIT (OUTPATIENT)
Dept: INTERNAL MEDICINE CLINIC | Age: 67
End: 2022-02-28
Payer: MEDICARE

## 2022-02-28 VITALS
BODY MASS INDEX: 32.5 KG/M2 | RESPIRATION RATE: 14 BRPM | HEART RATE: 51 BPM | SYSTOLIC BLOOD PRESSURE: 132 MMHG | OXYGEN SATURATION: 99 % | DIASTOLIC BLOOD PRESSURE: 78 MMHG | WEIGHT: 172 LBS

## 2022-02-28 DIAGNOSIS — M81.0 AGE-RELATED OSTEOPOROSIS WITHOUT CURRENT PATHOLOGICAL FRACTURE: ICD-10-CM

## 2022-02-28 DIAGNOSIS — E78.2 MIXED HYPERLIPIDEMIA: ICD-10-CM

## 2022-02-28 DIAGNOSIS — Z86.718 HISTORY OF DVT (DEEP VEIN THROMBOSIS): ICD-10-CM

## 2022-02-28 DIAGNOSIS — Z13.6 ENCOUNTER FOR LIPID SCREENING FOR CARDIOVASCULAR DISEASE: ICD-10-CM

## 2022-02-28 DIAGNOSIS — I10 ESSENTIAL HYPERTENSION: Primary | ICD-10-CM

## 2022-02-28 DIAGNOSIS — E03.9 ACQUIRED HYPOTHYROIDISM: ICD-10-CM

## 2022-02-28 DIAGNOSIS — E55.9 VITAMIN D DEFICIENCY: ICD-10-CM

## 2022-02-28 DIAGNOSIS — Z13.1 SCREENING FOR DIABETES MELLITUS (DM): ICD-10-CM

## 2022-02-28 DIAGNOSIS — R73.03 PREDIABETES: ICD-10-CM

## 2022-02-28 DIAGNOSIS — Z13.220 ENCOUNTER FOR LIPID SCREENING FOR CARDIOVASCULAR DISEASE: ICD-10-CM

## 2022-02-28 PROCEDURE — 99214 OFFICE O/P EST MOD 30 MIN: CPT | Performed by: INTERNAL MEDICINE

## 2022-02-28 ASSESSMENT — ENCOUNTER SYMPTOMS
COUGH: 0
WHEEZING: 0
VOMITING: 0
SHORTNESS OF BREATH: 0
DIARRHEA: 0
NAUSEA: 0
ABDOMINAL PAIN: 0

## 2022-02-28 NOTE — ASSESSMENT & PLAN NOTE
Lab Results   Component Value Date    LDLCALC 64 01/15/2021     Due for assessment. On atorvastatin 20mg daily. Exercising regularly.

## 2022-02-28 NOTE — PROGRESS NOTES
Hillary Snyder (:  1955) is a 79 y.o. female,Established patient, here for evaluation of the following chief complaint(s):  Follow-up      ASSESSMENT/PLAN:  1. Essential hypertension  Assessment & Plan:  Stable, controlled. Check labs today. Continue current regimen     Orders:  -     Comprehensive Metabolic Panel; Future  -     CBC with Auto Differential; Future  2. Acquired hypothyroidism  Assessment & Plan:  Clinically euthyroid. Check TSH. Continue current levothyroxine 100mcg daily. Orders:  -     Comprehensive Metabolic Panel; Future  -     TSH; Future  -     CBC with Auto Differential; Future  3. Age-related osteoporosis without current pathological fracture  Assessment & Plan:  DEXA  with -2.5 at lumbar spine. Family history of osteoporosis and hip fracture. Ongoing risk factor of thyroid replacement. After discussion of possible treatments we have decided to try alendronate weekly for treatment. Possible risks and benefits were discussed including risk of osteonecrosis of the jaw and atypical femur fracture. Will plan for 5 years of treatment with ongoing monitoring of bone density. Started in 2021  Orders:  -     Comprehensive Metabolic Panel; Future  -     Vitamin D 25 Hydroxy; Future  -     CBC with Auto Differential; Future  4. Encounter for lipid screening for cardiovascular disease  -     Lipid Panel; Future  5. Mixed hyperlipidemia  Assessment & Plan:  Lab Results   Component Value Date     [address] 01/15/2021     Due for assessment. On atorvastatin 20mg daily. Exercising regularly. Orders:  -     Comprehensive Metabolic Panel; Future  -     Lipid Panel; Future  6. Screening for diabetes mellitus (DM)  -     Hemoglobin A1C; Future  7. Vitamin D deficiency  -     Vitamin D 25 Hydroxy; Future  8. Prediabetes  Assessment & Plan:  Check A1C today. Continue healthy diet and exercise. Orders:  -     Hemoglobin A1C; Future  9.  History of DVT (deep vein thrombosis)  Assessment & Plan:  Provoked distal DVT in 2019 in setting of right TKR. Completed coumadin. Return in about 6 months (around 8/28/2022) for Physical.    SUBJECTIVE/OBJECTIVE:  HPI    Overall feeling well  Tolerating the fosamax. Having occasional left hip pain (anterior). Overall manageable and not severe. Review of Systems   Constitutional: Negative for chills, fatigue and fever. Respiratory: Negative for cough, shortness of breath and wheezing. Cardiovascular: Negative for chest pain and leg swelling. Gastrointestinal: Negative for abdominal pain, diarrhea, nausea and vomiting. Musculoskeletal: Positive for arthralgias. Psychiatric/Behavioral: Negative for dysphoric mood. Current Outpatient Medications on File Prior to Visit   Medication Sig Dispense Refill    Lifitegrast (Anay Graces OP) Apply to eye 1 drop twice a day in both eyes      alendronate (FOSAMAX) 70 MG tablet Take 1 tablet by mouth every 7 days 12 tablet 1    amLODIPine (NORVASC) 5 MG tablet TAKE 1 TABLET DAILY 90 tablet 3    atorvastatin (LIPITOR) 20 MG tablet TAKE 1 TABLET DAILY 90 tablet 3    lisinopril-hydroCHLOROthiazide (PRINZIDE;ZESTORETIC) 10-12.5 MG per tablet TAKE 1 TABLET DAILY 90 tablet 3    levothyroxine (SYNTHROID) 100 MCG tablet TAKE 1 TABLET DAILY (DOSE DECREASE) 90 tablet 3    Omega-3 Fat Ac-Cholecalciferol (DRY EYE OMEGA BENEFITS/VIT D-3) 896-757 MG-UNIT CAPS 2 capsules by mouth twice daily before meals      Propylene Glycol (SYSTANE COMPLETE) 0.6 % SOLN instill 1 Drop by Ophthalmic route 3-4 times every day OU      Multiple Vitamins-Minerals (MULTIVITAMIN PO) Take  by mouth daily.  Calcium Carbonate-Vit D-Min (CALTRATE 600+D PLUS) 600-400 MG-UNIT TABS Take  by mouth. Indications: one by mouth twice a day       No current facility-administered medications on file prior to visit.         Vitals:    02/28/22 1057   BP: 132/78   Pulse: 51   Resp: 14   SpO2: 99%     Physical Exam  Constitutional: General: She is not in acute distress. Appearance: Normal appearance. She is not diaphoretic. HENT:      Head: Normocephalic and atraumatic. Right Ear: External ear normal.      Left Ear: External ear normal.      Nose: Nose normal.      Mouth/Throat:      Mouth: Mucous membranes are moist.      Pharynx: Oropharynx is clear. Eyes:      General: No scleral icterus. Conjunctiva/sclera: Conjunctivae normal.   Cardiovascular:      Rate and Rhythm: Normal rate and regular rhythm. Pulses: Normal pulses. Heart sounds: Normal heart sounds. No murmur heard. No friction rub. No gallop. Pulmonary:      Effort: Pulmonary effort is normal.      Breath sounds: Normal breath sounds. No wheezing, rhonchi or rales. Abdominal:      General: Abdomen is flat. Bowel sounds are normal.   Musculoskeletal:         General: No deformity. Cervical back: Normal range of motion. Right lower leg: No edema. Left lower leg: No edema. Skin:     General: Skin is warm and dry. Findings: No rash. Neurological:      General: No focal deficit present. Mental Status: She is alert. Coordination: Coordination normal.      Gait: Gait normal.   Psychiatric:         Mood and Affect: Mood normal.         Behavior: Behavior normal.           On this date 2/28/2022 I have spent 30 minutes reviewing previous notes, test results and face to face with the patient discussing the diagnosis and importance of compliance with the treatment plan as well as documenting on the day of the visit. An electronic signature was used to authenticate this note.     --Wilda Melton MD

## 2022-03-24 DIAGNOSIS — E03.9 ACQUIRED HYPOTHYROIDISM: ICD-10-CM

## 2022-03-24 RX ORDER — LEVOTHYROXINE SODIUM 0.1 MG/1
TABLET ORAL
Qty: 90 TABLET | Refills: 3 | Status: SHIPPED | OUTPATIENT
Start: 2022-03-24

## 2022-04-12 DIAGNOSIS — M81.0 AGE-RELATED OSTEOPOROSIS WITHOUT CURRENT PATHOLOGICAL FRACTURE: ICD-10-CM

## 2022-04-12 RX ORDER — ALENDRONATE SODIUM 70 MG/1
TABLET ORAL
Qty: 12 TABLET | Refills: 1 | Status: SHIPPED | OUTPATIENT
Start: 2022-04-12 | End: 2022-08-29 | Stop reason: SDUPTHER

## 2022-06-01 DIAGNOSIS — I10 ESSENTIAL HYPERTENSION: ICD-10-CM

## 2022-06-02 RX ORDER — LISINOPRIL AND HYDROCHLOROTHIAZIDE 12.5; 1 MG/1; MG/1
TABLET ORAL
Qty: 90 TABLET | Refills: 3 | Status: SHIPPED | OUTPATIENT
Start: 2022-06-02

## 2022-06-02 RX ORDER — ATORVASTATIN CALCIUM 20 MG/1
TABLET, FILM COATED ORAL
Qty: 90 TABLET | Refills: 3 | Status: SHIPPED | OUTPATIENT
Start: 2022-06-02

## 2022-06-02 RX ORDER — AMLODIPINE BESYLATE 5 MG/1
TABLET ORAL
Qty: 90 TABLET | Refills: 3 | Status: SHIPPED | OUTPATIENT
Start: 2022-06-02

## 2022-08-26 SDOH — HEALTH STABILITY: PHYSICAL HEALTH: ON AVERAGE, HOW MANY DAYS PER WEEK DO YOU ENGAGE IN MODERATE TO STRENUOUS EXERCISE (LIKE A BRISK WALK)?: 5 DAYS

## 2022-08-26 SDOH — HEALTH STABILITY: PHYSICAL HEALTH: ON AVERAGE, HOW MANY MINUTES DO YOU ENGAGE IN EXERCISE AT THIS LEVEL?: 50 MIN

## 2022-08-26 ASSESSMENT — PATIENT HEALTH QUESTIONNAIRE - PHQ9
SUM OF ALL RESPONSES TO PHQ QUESTIONS 1-9: 0
SUM OF ALL RESPONSES TO PHQ QUESTIONS 1-9: 0
2. FEELING DOWN, DEPRESSED OR HOPELESS: 0
SUM OF ALL RESPONSES TO PHQ QUESTIONS 1-9: 0
SUM OF ALL RESPONSES TO PHQ9 QUESTIONS 1 & 2: 0
SUM OF ALL RESPONSES TO PHQ QUESTIONS 1-9: 0
1. LITTLE INTEREST OR PLEASURE IN DOING THINGS: 0

## 2022-08-26 ASSESSMENT — LIFESTYLE VARIABLES
HOW OFTEN DO YOU HAVE SIX OR MORE DRINKS ON ONE OCCASION: 1
HOW OFTEN DO YOU HAVE A DRINK CONTAINING ALCOHOL: 2
HOW MANY STANDARD DRINKS CONTAINING ALCOHOL DO YOU HAVE ON A TYPICAL DAY: 1
HOW OFTEN DO YOU HAVE A DRINK CONTAINING ALCOHOL: MONTHLY OR LESS
HOW MANY STANDARD DRINKS CONTAINING ALCOHOL DO YOU HAVE ON A TYPICAL DAY: 1 OR 2

## 2022-08-29 ENCOUNTER — OFFICE VISIT (OUTPATIENT)
Dept: INTERNAL MEDICINE CLINIC | Age: 67
End: 2022-08-29
Payer: MEDICARE

## 2022-08-29 ENCOUNTER — TELEPHONE (OUTPATIENT)
Dept: INTERNAL MEDICINE CLINIC | Age: 67
End: 2022-08-29

## 2022-08-29 VITALS
DIASTOLIC BLOOD PRESSURE: 80 MMHG | BODY MASS INDEX: 32.02 KG/M2 | SYSTOLIC BLOOD PRESSURE: 122 MMHG | WEIGHT: 169.6 LBS | OXYGEN SATURATION: 97 % | RESPIRATION RATE: 14 BRPM | HEART RATE: 55 BPM | HEIGHT: 61 IN

## 2022-08-29 DIAGNOSIS — M81.0 AGE-RELATED OSTEOPOROSIS WITHOUT CURRENT PATHOLOGICAL FRACTURE: ICD-10-CM

## 2022-08-29 DIAGNOSIS — L98.9 SKIN LESION: ICD-10-CM

## 2022-08-29 DIAGNOSIS — Z00.00 MEDICARE ANNUAL WELLNESS VISIT, SUBSEQUENT: Primary | ICD-10-CM

## 2022-08-29 DIAGNOSIS — E03.9 ACQUIRED HYPOTHYROIDISM: ICD-10-CM

## 2022-08-29 DIAGNOSIS — Z12.31 ENCOUNTER FOR SCREENING MAMMOGRAM FOR BREAST CANCER: ICD-10-CM

## 2022-08-29 DIAGNOSIS — Z23 NEED FOR VACCINATION: ICD-10-CM

## 2022-08-29 DIAGNOSIS — I10 ESSENTIAL HYPERTENSION: ICD-10-CM

## 2022-08-29 PROCEDURE — 90471 IMMUNIZATION ADMIN: CPT | Performed by: INTERNAL MEDICINE

## 2022-08-29 PROCEDURE — 1123F ACP DISCUSS/DSCN MKR DOCD: CPT | Performed by: INTERNAL MEDICINE

## 2022-08-29 PROCEDURE — G0439 PPPS, SUBSEQ VISIT: HCPCS | Performed by: INTERNAL MEDICINE

## 2022-08-29 PROCEDURE — 90677 PCV20 VACCINE IM: CPT | Performed by: INTERNAL MEDICINE

## 2022-08-29 RX ORDER — ALENDRONATE SODIUM 70 MG/1
TABLET ORAL
Qty: 12 TABLET | Refills: 3 | Status: SHIPPED | OUTPATIENT
Start: 2022-08-29

## 2022-08-29 NOTE — ASSESSMENT & PLAN NOTE
Clinically euthyroid. Lab Results   Component Value Date    TSH 0.83 02/28/2022    TSHREFLEX 0.41 11/27/2018      Continue current levothyroxine 100mcg daily.

## 2022-08-29 NOTE — TELEPHONE ENCOUNTER
Patient is calling to provide Dr. Arie Schwab with the date from her last COVID booster shot:    06/21/22. Please contact patient with any additional questions.

## 2022-08-29 NOTE — ASSESSMENT & PLAN NOTE
DEXA 8/21 with -2.5 at lumbar spine. Family history of osteoporosis and hip fracture. Ongoing risk factor of thyroid replacement. On alendronate - tolerating. Will plan for 5 years of treatment with ongoing monitoring of bone density. Started in 12/2021.

## 2022-08-29 NOTE — PATIENT INSTRUCTIONS
Personalized Preventive Plan for Starlette Cranker - 8/29/2022  Medicare offers a range of preventive health benefits. Some of the tests and screenings are paid in full while other may be subject to a deductible, co-insurance, and/or copay. Some of these benefits include a comprehensive review of your medical history including lifestyle, illnesses that may run in your family, and various assessments and screenings as appropriate. After reviewing your medical record and screening and assessments performed today your provider may have ordered immunizations, labs, imaging, and/or referrals for you. A list of these orders (if applicable) as well as your Preventive Care list are included within your After Visit Summary for your review. Other Preventive Recommendations:    A preventive eye exam performed by an eye specialist is recommended every 1-2 years to screen for glaucoma; cataracts, macular degeneration, and other eye disorders. A preventive dental visit is recommended every 6 months. Try to get at least 150 minutes of exercise per week or 10,000 steps per day on a pedometer . Order or download the FREE \"Exercise & Physical Activity: Your Everyday Guide\" from [employer]. Call 6-972.461.8182 or search [employer] online. You need 4647-5445 mg of calcium and 8050-9906 IU of vitamin D per day. It is possible to meet your calcium requirement with diet alone, but a vitamin D supplement is usually necessary to meet this goal.  When exposed to the sun, use a sunscreen that protects against both UVA and UVB radiation with an SPF of 30 or greater. Reapply every 2 to 3 hours or after sweating, drying off with a towel, or swimming. Always wear a seat belt when traveling in a car. Always wear a helmet when riding a bicycle or motorcycle. Tdap at [facility] booster when it's time.

## 2022-08-29 NOTE — PROGRESS NOTES
Medicare Annual Wellness Visit  Neves Going  8/29/2022    ASSESSMENT/PLAN   Medicare annual wellness visit, subsequent  Personalized Preventive Plan is provided to patient in written form- see Patient Instructions  Tdap at pharmacy  COVID #4 done - will send us records. Mammo due end of September - scheduled. On statin. Continue exercise  Bring copies of living will  Encounter for screening mammogram for breast cancer  Need for vaccination  -     Pneumococcal, PCV20, PREVNAR 20, (age 25 yrs+), IM, PF  Essential hypertension  Assessment & Plan:  Stable, controlled. Continue current regimen     Acquired hypothyroidism  Assessment & Plan:  Clinically euthyroid. Lab Results   Component Value Date    TSH 0.83 02/28/2022    TSHREFLEX 0.41 11/27/2018      Continue current levothyroxine 100mcg daily. Age-related osteoporosis without current pathological fracture  Assessment & Plan:  DEXA 8/21 with -2.5 at lumbar spine. Family history of osteoporosis and hip fracture. Ongoing risk factor of thyroid replacement. On alendronate - tolerating. Will plan for 5 years of treatment with ongoing monitoring of bone density. Started in 12/2021. Orders:  -     alendronate (FOSAMAX) 70 MG tablet; TAKE 1 TABLET BY MOUTH ONCE WEEKLY ON AN EMPTY STOMACH BEFORE BREAKFAST. REMAIN UPRIGHT FOR 30 MINUTES & TAKE WITH 8 OUNCES OF WATER, Disp-12 tablet, R-3Normal  Skin lesion  -     Herrera Rodríguez MD, [facility]    Recommendations for Preventive Services Due: see orders and patient instructions/AVS.  Recommended screening schedule for the next 5-10 years is provided to the patient in written form: see Patient Instructions/AVS.    Return in about 6 months (around 2/28/2023) for chronic conditions. SUBJECTIVE   Subjective Neves Going is here for Medicare AWV    She has been feeling well. Has a scaly skin lesion on her right hand knuckle present las few months.      Patient's complete Health Risk Assessment and screening values have been reviewed and are found in Flowsheets. The following risks were reviewed today and where indicated follow up appointments were made and/or referrals ordered. Positive Risk Factor Screenings with Interventions:             General Health and ACP:  General  In general, how would you say your health is?: Very Good  In the past 7 days, have you experienced any of the following: New or Increased Pain, New or Increased Fatigue, Loneliness, Social Isolation, Stress or Anger?: No  Do you get the social and emotional support that you need?: Yes  Do you have a Living Will?: Yes    Advance Directives       Power of  Living Will ACP-Advance Directive ACP-Power of     Not on File Not on File Not on File Not on File        General Health Risk Interventions:  Bring copies of living will    Health Habits/Nutrition:  Physical Activity: Sufficiently Active    Days of Exercise per Week: 5 days    Minutes of Exercise per Session: 50 min     Have you lost any weight without trying in the past 3 months?: No  Body mass index: (!) 32.04  Have you seen the dentist within the past year?: Yes  Health Habits/Nutrition Interventions:  Goes to the gyn 5-6 mornings per week - 20 minutes of cardio, lower body strength 3 days a week. 2 days of upper body.       Safety:  Do you have working smoke detectors?: Yes  Do you have any tripping hazards - loose or unsecured carpets or rugs?: No  Do you have any tripping hazards - clutter in doorways, halls, or stairs?: No  Do you have either shower bars, grab bars, non-slip mats or non-slip surfaces in your shower or bathtub?: (!) No  Do all of your stairways have a railing or banister?: Yes  Do you always fasten your seatbelt when you are in a car?: Yes  Safety Interventions:  Home safety tips provided       OBJECTIVE     Wt Readings from Last 3 Encounters:   08/29/22 169 lb 9.6 oz (76.9 kg)   02/28/22 172 lb (78 kg)   10/19/21 165 lb (74.8 kg) BP Readings from Last 3 Encounters:   08/29/22 122/80   02/28/22 132/78   10/19/21 128/78     Body mass index is 32.05 kg/m². Physical Exam  Constitutional:       General: She is not in acute distress. Appearance: Normal appearance. She is not diaphoretic. HENT:      Head: Normocephalic and atraumatic. Right Ear: External ear normal.      Left Ear: External ear normal.      Nose: Nose normal.      Mouth/Throat:      Mouth: Mucous membranes are moist.      Pharynx: Oropharynx is clear. Eyes:      General: No scleral icterus. Conjunctiva/sclera: Conjunctivae normal.   Cardiovascular:      Rate and Rhythm: Normal rate and regular rhythm. Pulses: Normal pulses. Heart sounds: Normal heart sounds. No murmur heard. No friction rub. No gallop. Pulmonary:      Effort: Pulmonary effort is normal.      Breath sounds: Normal breath sounds. No wheezing, rhonchi or rales. Abdominal:      General: Abdomen is flat. Bowel sounds are normal.   Musculoskeletal:         General: No deformity. Cervical back: Normal range of motion. Right lower leg: No edema. Left lower leg: No edema. Skin:     General: Skin is warm and dry. Findings: No rash. Comments: Scaly patch right hand   Neurological:      General: No focal deficit present. Mental Status: She is alert. Coordination: Coordination normal.      Gait: Gait normal.   Psychiatric:         Mood and Affect: Mood normal.         Behavior: Behavior normal.       Allergies   Allergen Reactions    Tussionex Pennkinetic Er [Hydrocod Polst-Cpm Polst Er]     Bactrim [Sulfamethoxazole-Trimethoprim] Rash    Doxycycline Hyclate Nausea And Vomiting    Jose-Tab [Erythromycin] Nausea And Vomiting    Sulfa Antibiotics Rash     Prior to Visit Medications    Medication Sig Taking?  Authorizing Provider   atorvastatin (LIPITOR) 20 MG tablet TAKE 1 TABLET DAILY Yes Lorena Talbot MD   lisinopril-hydroCHLOROthiazide (PRINZIDE;ZESTORETIC) 10-12.5 MG per tablet TAKE 1 TABLET DAILY Yes Carleen Dudley MD   amLODIPine (NORVASC) 5 MG tablet TAKE 1 TABLET DAILY Yes Carleen Dudley MD   alendronate (FOSAMAX) 70 MG tablet TAKE 1 TABLET BY MOUTH ONCE WEEKLY ON AN EMPTY STOMACH BEFORE BREAKFAST. REMAIN UPRIGHT FOR 30 MINUTES & TAKE WITH 8 OUNCES OF WATER Yes Carleen Dudley MD   levothyroxine (SYNTHROID) 100 MCG tablet TAKE 1 TABLET DAILY (DOSE DECREASE) Yes Carleen Dudley MD   Lifitegrast Carleen Dudley OP) Apply to eye 1 drop twice a day in both eyes Yes Historical Provider, MD   Omega-3 Fat Ac-Cholecalciferol (DRY EYE OMEGA BENEFITS/VIT D-3) 863-666 MG-UNIT CAPS 2 capsules by mouth twice daily before meals Yes Historical Provider, MD   Propylene Glycol 0.6 % SOLN instill 1 Drop by Ophthalmic route 3-4 times every day OU Yes Historical Provider, MD   Multiple Vitamins-Minerals (MULTIVITAMIN PO) Take  by mouth daily. Yes Historical Provider, MD   Calcium Carbonate-Vit D-Min (CALTRATE 600+D PLUS) 600-400 MG-UNIT TABS Take  by mouth.  Indications: one by mouth twice a day Yes Historical Provider, MD [place] (Including outside providers/suppliers regularly involved in providing care):   Patient Care Team:  Carleen Dudley MD as PCP - General (Internal Medicine)  Carleen Dudley MD as PCP - [facility] Empaneled Provider  Leon Malhotra MD as Consulting Physician (Ophthalmology)  Kenan Hobbs MD as Consulting Physician (Gynecology)  Francoise Grier MD as Consulting Physician ([facility])  Yao Wang APRN - CNP as Nurse Practitioner (Nurse Practitioner)    Current Health Maintenance Status  Health Maintenance   Topic Date Due    DTaP/Tdap/Td vaccine (2 - Td or Tdap) 01/12/2021    Pneumococcal 65+ years Vaccine (2 - PCV) 07/18/2021    COVID-19 Vaccine (4 - Booster for [place]  series) 02/27/2022    Annual Wellness Visit (AWV)  07/20/2022    Breast cancer screen  09/27/2022    Flu vaccine (1) 09/01/2022    A1C test (Diabetic or Prediabetic)  02/28/2023    Lipids  02/28/2023    Depression Screen  08/26/2023    Colorectal Cancer Screen  10/27/2030    DEXA (modify frequency per FRAX score)  Completed    Shingles vaccine  Completed    Hepatitis A vaccine  Aged Out    Hepatitis B vaccine  Aged Out    Hib vaccine  Aged Out    Meningococcal (ACWY) vaccine  Aged Out    Hepatitis C screen  Discontinued     Immunization History   Administered Date(s) Administered     Influenza, FLUZONE (age 72 y+), High Dose 09/30/2021    COVID-19, MODERNA BLUE border, Primary or Immunocompromised, (age 12y+), IM, 100 mcg/0.5mL 02/25/2021, 03/25/2021    COVID-19, MODERNA Booster BLUE border, (age 18y+), IM, 50mcg/0.25mL 10/27/2021    Influenza Virus Vaccine 09/01/2014, 11/01/2015, 10/17/2017, 10/30/2018, 10/01/2019, 10/07/2021    Influenza, FLUARIX, FLULAVAL, (age 10 mo+) AND AFLURIA, FLUZONE (age 1 y+), PF 09/15/2016    Influenza, High Dose (Fluzone 65 yrs and older) 09/30/2020    PPD Test 01/06/2010    Pneumococcal Polysaccharide ([id number]) 07/18/2020    Tdap (Boostrix, Adacel) 01/12/2011    Zoster Live (Zostavax) 09/04/2012    Zoster Recombinant (Shingrix) 09/05/2018, 12/28/2018            Reviewed and updated this visit:  Tobacco  Allergies  Meds  Med Hx  Surg Hx  Soc Hx  Fam Hx

## 2022-09-13 ENCOUNTER — OFFICE VISIT (OUTPATIENT)
Dept: DERMATOLOGY | Age: 67
End: 2022-09-13
Payer: MEDICARE

## 2022-09-13 DIAGNOSIS — R20.2 NOTALGIA PARESTHETICA: ICD-10-CM

## 2022-09-13 DIAGNOSIS — R21 RASH AND OTHER NONSPECIFIC SKIN ERUPTION: Primary | ICD-10-CM

## 2022-09-13 PROCEDURE — 99204 OFFICE O/P NEW MOD 45 MIN: CPT | Performed by: INTERNAL MEDICINE

## 2022-09-13 PROCEDURE — 1123F ACP DISCUSS/DSCN MKR DOCD: CPT | Performed by: INTERNAL MEDICINE

## 2022-09-13 RX ORDER — CLOBETASOL PROPIONATE 0.5 MG/G
OINTMENT TOPICAL
Qty: 60 G | Refills: 0 | Status: SHIPPED | OUTPATIENT
Start: 2022-09-13

## 2022-09-13 NOTE — PROGRESS NOTES
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 Chief Complaint:   Chief Complaint   Patient presents with    Skin Lesion     Rt hand scaly spot not healing         History of Present Illness:    Concern:  R hand lesions  Location: R dorsal hand  Duration:  Few months, getting newer area nearby  Symptoms: Not going away, not that itchy  Previous treatments:  Moisturize, does dishes BID    Concerns: Rash on posterior scalp  Duration: Several months  Sx: Very itchy, bothersome  Other hx: Uses various different shampoo types, doesn't dye hair    Concerns: Itch medial to L scapula  Duration: Several months  Sx: Itchy, never a rash per pt  Previous trmt: None       *Personal history of skin cancer: AK  *Family history of skin cancer: None     Review of Systems:  Gen: Feels well, good sense of health. Skin: No new or changing moles, no history of keloids or hypertrophic scars. Past Medical History, Family History, Surgical History, Medications and Allergies reviewed. Past Medical History:   Diagnosis Date    Anesthesia     \"heart stopped\" during ovarian surgery, abd insufflation. restarted on its own.  possibly vagal    Bloodgood disease 4/24/2008    Endometrial polyp 12/2014    Hx of blood clots 2019    Hyperlipidemia 2/13/2013    Hypertension     pregnancy induced    Hypothyroidism     Lateral epicondylitis 7/1/2015    Plantar fasciitis of right foot 7/24/2013    Sleep apnea     uses CPAP    Sleep apnea      Past Surgical History:   Procedure Laterality Date    BREAST BIOPSY  1987    Left    FINE NEEDLE ASPIRATION  1983    Left Breast    MOUTH SURGERY  1990s    jaw surgery    OVARIAN CYST SURGERY Left 12-27-13    OVARIAN CYST SURGERY      7/2017    TONSILLECTOMY      TOTAL KNEE ARTHROPLASTY Right 7/17/2019    RIGHT TOTAL KNEE ARTHROPLASTY performed by Sang Sarkar MD at [address] TOTAL KNEE ARTHROPLASTY Left 11/6/2019    LEFT TOTAL KNEE ARTHROPLASTY performed by Rima Davidson MD at [address]   Allergen Reactions    Tussionex Pennkinetic Er [Hydrocod Polst-Cpm Polst Er]     Bactrim [Sulfamethoxazole-Trimethoprim] Rash    Doxycycline Hyclate Nausea And Vomiting    Jose-Tab [Erythromycin] Nausea And Vomiting    Sulfa Antibiotics Rash     Outpatient Medications Marked as Taking for the 9/13/22 encounter (Office Visit) with Jermain Santos MD   Medication Sig Dispense Refill    clobetasol (TEMOVATE) 0.05 % ointment Apply rash on right hand twice a day for up to 2 weeks or until improved 60 g 0    alendronate (FOSAMAX) 70 MG tablet TAKE 1 TABLET BY MOUTH ONCE WEEKLY ON AN EMPTY STOMACH BEFORE BREAKFAST. REMAIN UPRIGHT FOR 30 MINUTES & TAKE WITH 8 OUNCES OF WATER 12 tablet 3    atorvastatin (LIPITOR) 20 MG tablet TAKE 1 TABLET DAILY 90 tablet 3    lisinopril-hydroCHLOROthiazide (PRINZIDE;ZESTORETIC) 10-12.5 MG per tablet TAKE 1 TABLET DAILY 90 tablet 3    amLODIPine (NORVASC) 5 MG tablet TAKE 1 TABLET DAILY 90 tablet 3    levothyroxine (SYNTHROID) 100 MCG tablet TAKE 1 TABLET DAILY (DOSE DECREASE) 90 tablet 3    Lifitegrast (XIIDRA OP) Apply to eye 1 drop twice a day in both eyes      Omega-3 Fat Ac-Cholecalciferol (DRY EYE OMEGA BENEFITS/VIT D-3) 554-473 MG-UNIT CAPS 2 capsules by mouth twice daily before meals      Propylene Glycol 0.6 % SOLN instill 1 Drop by Ophthalmic route 3-4 times every day OU      Multiple Vitamins-Minerals (MULTIVITAMIN PO) Take  by mouth daily. Calcium Carbonate-Vit D-Min (CALTRATE 600+D PLUS) 600-400 MG-UNIT TABS Take  by mouth. Indications: one by mouth twice a day             Physical Examination   No acute distress. Mood clear/affect appropriate. Alert and oriented. Mucous membranes moist.  Sclera anicteric.     Waist up skin exam was conducted to include the scalp, face, lips, lids/conjunctiva, ears, neck, chest, back, right and left hands

## 2022-09-13 NOTE — PATIENT INSTRUCTIONS
Thank you for visiting [facility] today! Please follow the instructions below as we discussed in clinic:      Start applying clobetasol ointment twice a day to rash on hands for up to 2 weeks per month. You can also use this to itchy area on your back  Switch your shampoo to one from list below. 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 We talked about starting gabapentin for this or capsaicin cream    Hair Care: Conditioners  Cleure  Replenishing Conditioner      DHS   DHS Conditioning Rinse With Panthenol   DHS Color Safe Rinse With Panthenol      No-Nothing  Fragrance Free Very Sensitive Moisture Conditioner      TrueCider  Creamy Conditioner      VMV Hypoallergenics  Essence Skin-Saving Milk Conditioner     Hair Care: Shampoos  Zara's Tallow Treasures  Unscented Shampoo Soap      Cleure  Shampoo, Hydrating & Volumizing      CLn   Cln Healthy Scalp Shampoo   Moisture Rich Gentle Shampoo    Cln 2-in-1 Gentle Wash and Shampoo      [place] Garden Soaps  Fragrance Free Solid Shampoo Bar      FADY Valente's  Moisturizing Formula Shampoo Bar      [place]  Shampoo Bar, Avocado Oil, Fragrance-Free      Skinny & Co.  Clarifying Raw Shampoo Bar      The Soap Opera  [place] Goat Milk Fragrance Free Shampoo Bar      TrueCider  True Cider Shampoo and Body Wash      VMV Hypoallergenics   Essence Skin-Saving Oscar Wash Hair + Body \"Big Softie\" Shampoo   Essence Skin-Saving Superwash Hair + Body Milk Shampoo      Whiff   Unscented Shampoo Bar       Hair Care: Stylers and Treatments  Biolage by Matrix  R.A.WMily  Texturizing Styling Hair Spray (d)      Cleure   Hair Styling Gel, Medium Hold   Hair Spray, Firm Hold      Clinique  Hair Care Non-Aerosol Hairspray, Gentle Hold      Free & Clear  Styling and Finishing Hair Spray, Soft Hold (d)      No-Nothing   Fragrance Free Very Sensitive And Super Strong Hairspray   Fragrance Free Very Sensitive And Strong Hairspray Vanicream   Hair Gel, For Sensitive Skin   Hair Spray, Firm Hold

## 2022-10-03 LAB — MAMMOGRAPHY, EXTERNAL: NORMAL

## 2022-10-20 ENCOUNTER — TELEMEDICINE (OUTPATIENT)
Dept: PULMONOLOGY | Age: 67
End: 2022-10-20
Payer: MEDICARE

## 2022-10-20 DIAGNOSIS — I10 ESSENTIAL HYPERTENSION: Chronic | ICD-10-CM

## 2022-10-20 DIAGNOSIS — G47.33 OBSTRUCTIVE SLEEP APNEA SYNDROME: Chronic | ICD-10-CM

## 2022-10-20 DIAGNOSIS — E66.09 CLASS 1 OBESITY DUE TO EXCESS CALORIES WITH SERIOUS COMORBIDITY AND BODY MASS INDEX (BMI) OF 32.0 TO 32.9 IN ADULT: Chronic | ICD-10-CM

## 2022-10-20 PROBLEM — E66.9 CLASS 1 OBESITY WITHOUT SERIOUS COMORBIDITY WITH BODY MASS INDEX (BMI) OF 31.0 TO 31.9 IN ADULT: Chronic | Status: ACTIVE | Noted: 2020-01-13

## 2022-10-20 PROBLEM — E66.811 CLASS 1 OBESITY WITHOUT SERIOUS COMORBIDITY WITH BODY MASS INDEX (BMI) OF 31.0 TO 31.9 IN ADULT: Chronic | Status: ACTIVE | Noted: 2020-01-13

## 2022-10-20 PROCEDURE — 1123F ACP DISCUSS/DSCN MKR DOCD: CPT | Performed by: INTERNAL MEDICINE

## 2022-10-20 PROCEDURE — 99214 OFFICE O/P EST MOD 30 MIN: CPT | Performed by: INTERNAL MEDICINE

## 2022-10-20 ASSESSMENT — SLEEP AND FATIGUE QUESTIONNAIRES
HOW LIKELY ARE YOU TO NOD OFF OR FALL ASLEEP WHILE SITTING QUIETLY AFTER LUNCH WITHOUT ALCOHOL: 1
HOW LIKELY ARE YOU TO NOD OFF OR FALL ASLEEP WHILE SITTING INACTIVE IN A PUBLIC PLACE: 1
HOW LIKELY ARE YOU TO NOD OFF OR FALL ASLEEP WHILE SITTING AND TALKING TO SOMEONE: 0
HOW LIKELY ARE YOU TO NOD OFF OR FALL ASLEEP WHEN YOU ARE A PASSENGER IN A CAR FOR AN HOUR WITHOUT A BREAK: 1
HOW LIKELY ARE YOU TO NOD OFF OR FALL ASLEEP WHILE WATCHING TV: 1
ESS TOTAL SCORE: 7
HOW LIKELY ARE YOU TO NOD OFF OR FALL ASLEEP WHILE LYING DOWN TO REST IN THE AFTERNOON WHEN CIRCUMSTANCES PERMIT: 3
HOW LIKELY ARE YOU TO NOD OFF OR FALL ASLEEP IN A CAR, WHILE STOPPED FOR A FEW MINUTES IN TRAFFIC: 0
HOW LIKELY ARE YOU TO NOD OFF OR FALL ASLEEP WHILE SITTING AND READING: 0

## 2022-10-20 NOTE — PROGRESS NOTES
Tyrese Sweet [address], [address] [facility] [phone]   [facility] Johnna Wiseman. [address] [place] [address]. Yohana Sheriff 37 [phone]     Video Visit- Follow up    Kaz Crain, was evaluated through a synchronous (real-time) audio-video  encounter. The patient (or guardian if applicable) is aware that this is a billable  service, which includes applicable co-pays. This Virtual Visit was conducted with  patient's (and/or legal guardian's) consent. The visit was conducted pursuant to  the emergency declaration under the [address] [place] [address] waiver authority and the [employer] and  [employer] General Act. Patient identification was verified,  and a caregiver was present when appropriate. The patient was located in a  state where the provider was licensed to provide care. Assessment/Plan:      1. Obstructive sleep apnea syndrome  Assessment & Plan:  Chronic-Stable: Reviewed and analyzed results of physiologic download from patient's machine and reviewed with patient. Supplies and parts as needed for her machine. These are medically necessary. Limit caffeine use after 3pm. Based on the analyzed data will continue with current settings     2. Essential hypertension  Assessment & Plan:  Chronic- Stable. Discussed the importance of treating sleep apnea as part of the management of this disorder. Cont any meds per PCP and other physicians. 3. Class 1 obesity due to excess calories with serious comorbidity and body mass index (BMI) of 32.0 to 32.9 in adult  Assessment & Plan:  Chronic-not stable:  Discussed importance of treating obstructive sleep apnea and getting sufficient sleep to assist with weight control. Encouraged her to work on weight loss through diet and exercise. Recommended DASH or Mediterranean diets.      Reviewed, analyzed, and documented physiologic data from patient's PAP machine. This information was analyzed to assess complexity and medical decision making in regards to further testing and management. Diagnoses of Obstructive sleep apnea syndrome, Essential hypertension, and Class 1 obesity due to excess calories with serious comorbidity and body mass index (BMI) of 32.0 to 32.9 in adult were pertinent to this visit. The chronic medical conditions listed are directly related to the primary diagnosis listed above. The management of the primary diagnosis affects the secondary diagnosis and vice versa. Subjective:     Patient ID: Cindy Antunez is a 79 y.o. female. Chief Complaint   Patient presents with    Sleep Apnea     Subjective   HPI:    Machine Modem/Download Info:  Compliance (hours/night): 6.7 hrs/night  % of nights >= 4 hrs: 98.9 %  Download AHI (/hour): 0.9 /HR  Average CPAP Pressure : 7.5 cmH2O APAP - Settings  Pressure Min: 6 cmH2O  Pressure Max: 16 cmH2O                 Comfort Settings  Humidity Level (0-8): 3  Flex/EPR (0-3): 3       She continues to do well with her machine at the current settings. No issues with EDS, snoring, or apneas. She is waking refreshed, for the most part, in the am.  No HA, dryness, or congestion. No issues using her machine. Got her replacement machine. [address]    [place] Sleepiness Score: 7    Current Outpatient Medications   Medication Instructions    alendronate (FOSAMAX) 70 MG tablet TAKE 1 TABLET BY MOUTH ONCE WEEKLY ON AN EMPTY STOMACH BEFORE BREAKFAST. REMAIN UPRIGHT FOR 30 MINUTES & TAKE WITH 8 OUNCES OF WATER    amLODIPine (NORVASC) 5 MG tablet TAKE 1 TABLET DAILY    atorvastatin (LIPITOR) 20 MG tablet TAKE 1 TABLET DAILY    Calcium Carbonate-Vit D-Min (CALTRATE 600+D PLUS) 600-400 MG-UNIT TABS Take  by mouth.  Indications: one by mouth twice a day    clobetasol (TEMOVATE) 0.05 % ointment Apply rash on right hand twice a day for up to 2 weeks or until improved    levothyroxine (SYNTHROID) 100 MCG tablet TAKE 1 TABLET DAILY (DOSE DECREASE)    Lifitegrast (XIIDRA OP) Ophthalmic, 1 drop twice a day in both eyes     lisinopril-hydroCHLOROthiazide (PRINZIDE;ZESTORETIC) 10-12.5 MG per tablet TAKE 1 TABLET DAILY    Multiple Vitamins-Minerals (MULTIVITAMIN PO) Oral, DAILY    Omega-3 Fat Ac-Cholecalciferol (DRY EYE OMEGA BENEFITS/VIT D-3) 872-756 MG-UNIT CAPS 2 capsules by mouth twice daily before meals    Propylene Glycol 0.6 % SOLN instill 1 Drop by Ophthalmic route 3-4 times every day OU        Electronically signed by Sharlene Rodriges MD on 10/20/2022 at 10:37 AM

## 2022-10-20 NOTE — PROGRESS NOTES
Hari Holman         : 1955    Diagnosis: [x] RAMANDEEP (G47.33) [] CSA (G47.31) [] Apnea (G47.30)   Length of Need: [x] 18 Months [] 99 Months [] Other:    Machine (ARLYN!): [] Respironics Dream Station      Auto [] ResMed AirSense     Auto [] Other:     []  CPAP () [] Bilevel ()   Mode: [] Auto [] Spontaneous    Mode: [] Auto [] Spontaneous            Comfort Settings:        Humidifier: [] Heated ()        [x] Water chamber replacement ()/ 1 per 6 months        Mask:   [x] Nasal () /1 per 3 months [] Full Face () /1 per 3 months   [x] Patient choice -Size and fit mask [] Patient Choice - Size and fit mask   [] Dispense:  [] Dispense:    [x] Headgear () / 1 per 3 months [] Headgear () / 1 per 3 months   [x] Replacement Nasal Cushion ()/2 per month [] Interface Replacement ()/1 per month   [x] Replacement Nasal Pillows ()/2 per month         Tubing: [x] Heated ()/1 per 3 months    [] Standard ()/1 per 3 months [] Other:           Filters: [x] Non-disposable ()/1 per 6 months     [x] Ultra-Fine, Disposable ()/2 per month        Miscellaneous: [] Chin Strap ()/ 1 per 6 months [] O2 bleed-in:       LPM   [] Oximetry on CPAP/Bilevel []  Other:    [x] Modem: ()         Start Order Date: 10/20/22    MEDICAL JUSTIFICATION:  I, the undersigned, certify that the above prescribed supplies are medically necessary for this patients wellbeing. In my opinion, the supplies are both reasonable and necessary in reference to accepted standards of medicalpractice in treatment of this patients condition.     Bill Smith MD      NPI: [id number]        Order Signed Date: 10/20/22    Electronically signed by Bill Smith MD on 10/20/2022 at 10:27 AM    Hari Holman  1955  [facility] [address] [place] [address] 3  [phone] (home)   [phone] (mobile)      Insurance Info (confirm with patient if correct):  Payer/Plan Subscr  Sex Relation Sub.  Ins. ID Effective Group Num

## 2022-10-20 NOTE — LETTER
[facility]  [place] [address] [place] [id number]  Phone: [phone]  Fax: [phone]    Ace Wei MD    October 20, 2022     Gena Sigala, [address] [address]    Patient: Ardie Gaucher   MR Number: [id number]   YOB: 1955   Date of Visit: 10/20/2022       Dear Gena Sigala: Thank you for referring Eugenio Serrano to me for evaluation/treatment. Below are the relevant portions of my assessment and plan of care. 1. Obstructive sleep apnea syndrome  Assessment & Plan:  Chronic-Stable: Reviewed and analyzed results of physiologic download from patient's machine and reviewed with patient. Supplies and parts as needed for her machine. These are medically necessary. Limit caffeine use after 3pm. Based on the analyzed data will continue with current settings     2. Essential hypertension  Assessment & Plan:  Chronic- Stable. Discussed the importance of treating sleep apnea as part of the management of this disorder. Cont any meds per PCP and other physicians. 3. Class 1 obesity due to excess calories with serious comorbidity and body mass index (BMI) of 32.0 to 32.9 in adult  Assessment & Plan:  Chronic-not stable:  Discussed importance of treating obstructive sleep apnea and getting sufficient sleep to assist with weight control. Encouraged her to work on weight loss through diet and exercise. Recommended DASH or Mediterranean diets. Reviewed, analyzed, and documented physiologic data from patient's PAP machine. This information was analyzed to assess complexity and medical decision making in regards to further testing and management. Diagnoses of Obstructive sleep apnea syndrome, Essential hypertension, and Class 1 obesity due to excess calories with serious comorbidity and body mass index (BMI) of 32.0 to 32.9 in adult were pertinent to this visit.  The chronic medical conditions listed are directly related to the primary diagnosis listed above. The management of the primary diagnosis affects the secondary diagnosis and vice versa. If you have questions, please do not hesitate to call me. I look forward to following Julianna Santos along with you.     Sincerely,      Merlyn Herrera MD

## 2022-10-25 ENCOUNTER — OFFICE VISIT (OUTPATIENT)
Dept: DERMATOLOGY | Age: 67
End: 2022-10-25
Payer: MEDICARE

## 2022-10-25 DIAGNOSIS — D23.5 INTRADERMAL NEVUS OF TORSO: Primary | ICD-10-CM

## 2022-10-25 DIAGNOSIS — D18.01 CHERRY ANGIOMA: ICD-10-CM

## 2022-10-25 DIAGNOSIS — L81.4 LENTIGINES: ICD-10-CM

## 2022-10-25 DIAGNOSIS — L82.1 OTHER SEBORRHEIC KERATOSIS: ICD-10-CM

## 2022-10-25 DIAGNOSIS — L23.5 ALLERGIC DERMATITIS DUE TO OTHER CHEMICAL PRODUCT: ICD-10-CM

## 2022-10-25 DIAGNOSIS — R20.2 NOTALGIA PARESTHETICA: ICD-10-CM

## 2022-10-25 PROCEDURE — 1123F ACP DISCUSS/DSCN MKR DOCD: CPT | Performed by: INTERNAL MEDICINE

## 2022-10-25 PROCEDURE — 99213 OFFICE O/P EST LOW 20 MIN: CPT | Performed by: INTERNAL MEDICINE

## 2022-10-25 NOTE — PROGRESS NOTES
[facility]  Jaja Weinstein MD  [phone]    Date of Visit: 10/25/2022  LV 9/2022    Danny Elliott is a 79 y.o. female who presents for skin lesions. Chief Complaint:   Chief Complaint   Patient presents with    Skin Exam     TBSE and follow up rash on hand- doing better        History of Present Illness:    Concern: Rash on hand/posterior scalp last visit c/w ACD  Sx: Itchy  Current trmt:  -Clobetasol ointment BID PRN  -Switched shampoos around the time it happened, now switched to bland one from my list and it improved  Effect of trmt: Resolved     Patient denies any other new or changing skin lesions. They would like all of their skin lesions to be evaluated for skin cancer. *Derm hx:  -Notalgia paresthetica--deferred trmt    *Personal history of skin cancer: AK  *Family history of skin cancer: None     Review of Systems:  Gen: Feels well, good sense of health. Skin: No new or changing moles, no history of keloids or hypertrophic scars. Past Medical History, Family History, Surgical History, Medications and Allergies reviewed. Past Medical History:   Diagnosis Date    Anesthesia     \"heart stopped\" during ovarian surgery, abd insufflation. restarted on its own.  possibly vagal    Bloodgood disease 4/24/2008    Endometrial polyp 12/2014    Hx of blood clots 2019    Hyperlipidemia 2/13/2013    Hypertension     pregnancy induced    Hypothyroidism     Lateral epicondylitis 7/1/2015    Obstructive sleep apnea syndrome     Plantar fasciitis of right foot 7/24/2013    Sleep apnea     uses CPAP    Sleep apnea      Past Surgical History:   Procedure Laterality Date    BREAST BIOPSY  1987    Left    FINE NEEDLE ASPIRATION  1983    Left Breast    MOUTH SURGERY  1990s    jaw surgery    OVARIAN CYST SURGERY Left 12-27-13    OVARIAN CYST SURGERY      7/2017    TONSILLECTOMY      TOTAL KNEE ARTHROPLASTY Right 7/17/2019    RIGHT TOTAL KNEE ARTHROPLASTY performed by Elma Parsons MD at [facility] OR    TOTAL KNEE ARTHROPLASTY Left 11/6/2019    LEFT TOTAL KNEE ARTHROPLASTY performed by Elba Schofield MD at [address]   Allergen Reactions    Tussionex Pennkinetic Er [Hydrocod Polst-Cpm Polst Er]     Bactrim [Sulfamethoxazole-Trimethoprim] Rash    Doxycycline Hyclate Nausea And Vomiting    Jose-Tab [Erythromycin] Nausea And Vomiting    Sulfa Antibiotics Rash     Outpatient Medications Marked as Taking for the 10/25/22 encounter (Office Visit) with Song Lerner MD   Medication Sig Dispense Refill    alendronate (FOSAMAX) 70 MG tablet TAKE 1 TABLET BY MOUTH ONCE WEEKLY ON AN EMPTY STOMACH BEFORE BREAKFAST. REMAIN UPRIGHT FOR 30 MINUTES & TAKE WITH 8 OUNCES OF WATER 12 tablet 3    atorvastatin (LIPITOR) 20 MG tablet TAKE 1 TABLET DAILY 90 tablet 3    lisinopril-hydroCHLOROthiazide (PRINZIDE;ZESTORETIC) 10-12.5 MG per tablet TAKE 1 TABLET DAILY 90 tablet 3    amLODIPine (NORVASC) 5 MG tablet TAKE 1 TABLET DAILY 90 tablet 3    levothyroxine (SYNTHROID) 100 MCG tablet TAKE 1 TABLET DAILY (DOSE DECREASE) 90 tablet 3    Lifitegrast (XIIDRA OP) Apply to eye 1 drop twice a day in both eyes      Omega-3 Fat Ac-Cholecalciferol (DRY EYE OMEGA BENEFITS/VIT D-3) 948-277 MG-UNIT CAPS 2 capsules by mouth twice daily before meals      Propylene Glycol 0.6 % SOLN instill 1 Drop by Ophthalmic route 3-4 times every day OU      Multiple Vitamins-Minerals (MULTIVITAMIN PO) Take  by mouth daily. Calcium Carbonate-Vit D-Min (CALTRATE 600+D PLUS) 600-400 MG-UNIT TABS Take  by mouth. Indications: one by mouth twice a day             Physical Examination   No acute distress. Mood clear/affect appropriate. Alert and oriented. Mucous membranes moist.  Sclera anicteric.   Full body skin exam was conducted to include the scalp, face, lips/teeth, lids/conjunctiva, ears, neck, chest, abdomen, back, buttock, right and left hands and forearms, right and left leg and feet and was normal with the following exceptions  -Skin colored papules on back  -Scattered, brown, stuck-on appearing, verrucous papules on trunk and extremities  - Scattered bright erythematous 2-3mm papules on trunk and bilateral extremities  - Multiple tan to light brown macules on face, shoulders and arms in a photo-distributed pattern  -Rash resolved on hand and post scalp    Assessment and Plan       1. Intradermal nevus of torso  - Benign, reassurance  - Counseled on importance of daily sun protection (Broad spectrum, SPF >30), monthly self skin exams  - Reviewed ABCDEs of melanoma  - Sun screen hand out provided    2. Lentigines  -Benign, reassurance   - Reviewed relationship with sun exposure  - Rec daily sunscreen with SPF 30, broad spectrum    3. Other seborrheic keratosis  -Benign, reassurance       4. Cherry angioma  -Benign, reassurance     5. Notalgia paresthetica  -Benign, reassurance   -Call if worsening     6. Allergic dermatitis due to other chemical product, hand/scalp--resolved  -Cont  - clobetasol (TEMOVATE) 0.05 % ointment; Apply rash on right hand twice a day for up to 2 weeks or until improved  Dispense: 60 g; Refill: 0  -Cont bland skin care  - Moisturize with Aquaphor or Vaseline daily       Return in about 1 year (around 10/25/2023). ; 3 months to fu rash    Note is transcribed using voice recognition software. Inadvertent computerized transcription errors may be present.     Claudine Parry MD

## 2022-10-25 NOTE — PATIENT INSTRUCTIONS
Thank you for visiting [facility] today! Please follow the instructions below as we discussed in clinic:      The brown scaly spots on your breast are benign seborrheic keratosis      SUNSCREENS: UVA and UVB PROTECTION    Sunlight consists of two types of light that can cause or worsen most skin problems:    UVA (ultraviolet A)  UVB (ultraviolet B)   Causes brown spots/sun spots Causes skin cancer   Causes wrinkles Causes sunburn   Causes aging Responsible for tanning    Worsens rosacea Strongest in summer    Less variation with seasons Peak hours 10am-2pm   All year round  SPF rates UVB protection    All day strong    No rating system available     Passes through glass and clouds      *Sunscreens that block both UVA and UVB light contain:  Zinc Oxide (should contain at least 5% zinc oxide)  Titanium Dioxide  Parsol or Avobenzone (additives improve stability of Avobenzone)  There are other UVA blocking ingredients but they are not as complete as these three. Tips/Suggestions  1) Always use sunscreens with SPF of 30 or higher  2) Make sure the sunscreen has zinc oxide or other UVA block such as Helioplex or Anthelios in product  3) Remember there is no \"safe UV light:. ..so there is no such thing as a safe suntan. 4) Apply sunscreen daily (even in winter and on cloudy days) and reapply every 2 to 4 hours depending on activity. 5) Approximately one ounce (a shot glass) is necessary to adequately cover the entire body. 6) Approximately one teaspoon is necessary to adequately cover the face    TINTED SUNSCREENS  - La Roche Posay. Anthelios mineral tinted sunscreen. SPF. 50  [address]vene. [place] UV Mineral Multi-Defense Tinted Sunscreen SPF 50+   Avene. Mineral Tinted Compact SPF 48. Tizo 3 facial primer tinted SPF 40  Eltamd Uv Glow Tinted Broad-Spectrum Spf 36  Eltamd Uv Restore Tinted Broad-Spectrum Spf 40   Eltamd Uv Physical Broad-Spectrum Spf 41   Eltamd Uv Elements Broad-Spectrum Spf 44   Dermablend. Cover Creme. High-performance cream foundation for maximum coverage SPF 30   Vichy. [employer] Stephie Tinted 100% Mineral Sunscreen SPF 60%. Vichy. [address] CORRECTIVE FLUID [facility]. [address]. Isdin. Eryfotona Ageless. Ultralight tinted mineral sunscreen. Isdin. Isdinceutics Mineral Brush. Supergoop Mineral CC cream (Comes in several shades, friendly for pigmented skin)  CoTz Flawless Complexion SPF 50 tinted  CoTz Face Prime and Protect SPF 40 tinted    CHEMICAL SUNSCREEN  - La Roche-Posay Anthelios Melt-in Milk Body Face Sunscreen Lotion Broad Spectrum SPF 61 or 100   - La Roche-Posay ANTHELIOS COOLING WATER SUNSCREEN LOTION SPF 60  - La Roche-Posay ANTHELIOS LOTION SPRAY SUNSCREEN SPF 60  - La Roche-Posay ANTHELIOS ACTIVEWEAR SPORT SUNSCREEN LOTION SPF 60    MOISTURIZER WITH CHEMICAL SUNSCREEN NON-COMEDOGENIC  - CeraVe Facial Moisturizing Lotion AM with Sunscreen, Broad Spectrum SPF 30: Ceramides/niacinamide/HA. - CeraVe Ultra-Light Moisturizing Lotion with Sunscreen, Broad Spectrum SPF 30. Ceramides/HA   - La Roche-Posay Toleriane Double Repair Moisturizer SPF 27. Ceramine/Niacinamide. Very dry skin  - Eucerin Daily Protection. Moisturizing Face Lotion Sunscreen SPF 30  - Cetaphil® PRO Dermacontrol. Oil Absorbing Moisturizer SPF 30: Very oily skin  - Cetaphil® Daily Facial Moisturizer SPF 48: Dry skin  - Neutrogena Visibly Even Daily Moisturizer with Sunscreen, Broad Spectrum SPF [place] Shield Water Gel Sunscreen, Broad Spectrum SPF 25  - Aveeno Positively Radiant Daily Moisturizer, Broad Spectrum SPF 30    MOISTURIZER WITH PHYSICAL SUNSCREEN. NON-COMEDOGENIC  - Neutrogena. Ultra-Calming Daily Jenness Mayra MOSELEY UV Physical Broad Spectrum SPF 39. Water resistant.  - La Roche-Posay Anthelios SPF 50 Ultra Light Mineral Sunscreen Fluid.  Water resistant  - La Roche-Posay Anthelios 100% Mineral Sunscreen Moisturizer with

## 2022-11-22 DIAGNOSIS — M81.0 AGE-RELATED OSTEOPOROSIS WITHOUT CURRENT PATHOLOGICAL FRACTURE: ICD-10-CM

## 2022-11-22 RX ORDER — ALENDRONATE SODIUM 70 MG/1
TABLET ORAL
Qty: 12 TABLET | Refills: 3 | Status: SHIPPED | OUTPATIENT
Start: 2022-11-22

## 2022-11-22 NOTE — TELEPHONE ENCOUNTER
Patient is requesting  refill on the following medication:  alendronate (FOSAMAX) 70 MG tablet  Last appointment: 8/29/2022  Next appointment: 2/28/2023  Last refill: 08/29/2022      Please call in to:  [address], [address] [phone] - F [phone]   [place] [address]   Phone:  [phone]  Fax:  [phone]    Please contact patient with any additional questions

## 2022-11-23 ENCOUNTER — TELEPHONE (OUTPATIENT)
Dept: INTERNAL MEDICINE CLINIC | Age: 67
End: 2022-11-23

## 2022-11-23 NOTE — TELEPHONE ENCOUNTER
I spoke with patient to offer COVID testing here and a virtual visit. But she believes she is on the mend. SX started last Thursday with severe sore throat but that has since dissipated and she now just has head congestion. She would rather not start other medication unless she absolutely has too. I advised patient since she is on the mend we can just watch and see. She agreed.  She will OTC mucinex

## 2022-11-23 NOTE — TELEPHONE ENCOUNTER
Can we do a virtual with patient today since Dr Angelia Ji is out of the office?  Please review below  Thank you

## 2022-11-23 NOTE — TELEPHONE ENCOUNTER
Patient has not been feeling well for about a week  (fatigue, bad congestion-no other symptoms). She took an  Home COVID test this morning just out of curiosity because she could not shake these symptoms and it was positive. Is this test still valid if it  in 2022? Please call to discuss her concerns at number provided.

## 2023-02-14 ENCOUNTER — PATIENT MESSAGE (OUTPATIENT)
Dept: INTERNAL MEDICINE CLINIC | Age: 68
End: 2023-02-14

## 2023-02-14 DIAGNOSIS — Z13.1 SCREENING FOR DIABETES MELLITUS (DM): ICD-10-CM

## 2023-02-14 DIAGNOSIS — E55.9 VITAMIN D DEFICIENCY: ICD-10-CM

## 2023-02-14 DIAGNOSIS — Z13.6 ENCOUNTER FOR LIPID SCREENING FOR CARDIOVASCULAR DISEASE: ICD-10-CM

## 2023-02-14 DIAGNOSIS — M81.0 AGE-RELATED OSTEOPOROSIS WITHOUT CURRENT PATHOLOGICAL FRACTURE: Primary | ICD-10-CM

## 2023-02-14 DIAGNOSIS — E03.9 ACQUIRED HYPOTHYROIDISM: ICD-10-CM

## 2023-02-14 DIAGNOSIS — Z13.220 ENCOUNTER FOR LIPID SCREENING FOR CARDIOVASCULAR DISEASE: ICD-10-CM

## 2023-02-14 DIAGNOSIS — R73.03 PREDIABETES: ICD-10-CM

## 2023-02-14 DIAGNOSIS — I10 ESSENTIAL HYPERTENSION: ICD-10-CM

## 2023-02-14 NOTE — TELEPHONE ENCOUNTER
Sarah Corral MA 2/14/2023 5:06 PM EST      ----- Message -----  From: Keagan Borden  Sent: 2/14/2023 5:01 PM EST  To: [facility]  Subject: Pre-visit testing     Good afternoon. I have a visit scheduled for February 28. If I need any lab tests I could do this ahead of the visit at a [facility] lab near me. If you want to schedule any necessary tests ahead of time let me know and I will have that done before my visit.     Thanks,  Aniyah Olivia

## 2023-02-22 DIAGNOSIS — R73.03 PREDIABETES: ICD-10-CM

## 2023-02-22 DIAGNOSIS — Z13.220 ENCOUNTER FOR LIPID SCREENING FOR CARDIOVASCULAR DISEASE: ICD-10-CM

## 2023-02-22 DIAGNOSIS — E03.9 ACQUIRED HYPOTHYROIDISM: ICD-10-CM

## 2023-02-22 DIAGNOSIS — Z13.6 ENCOUNTER FOR LIPID SCREENING FOR CARDIOVASCULAR DISEASE: ICD-10-CM

## 2023-02-22 DIAGNOSIS — E55.9 VITAMIN D DEFICIENCY: ICD-10-CM

## 2023-02-22 DIAGNOSIS — I10 ESSENTIAL HYPERTENSION: ICD-10-CM

## 2023-02-22 DIAGNOSIS — M81.0 AGE-RELATED OSTEOPOROSIS WITHOUT CURRENT PATHOLOGICAL FRACTURE: ICD-10-CM

## 2023-02-22 DIAGNOSIS — Z13.1 SCREENING FOR DIABETES MELLITUS (DM): ICD-10-CM

## 2023-02-22 LAB
A/G RATIO: 1.5 (ref 1.1–2.2)
ALBUMIN SERPL-MCNC: 4.3 G/DL (ref 3.4–5)
ALP BLD-CCNC: 68 U/L (ref 40–129)
ALT SERPL-CCNC: 17 U/L (ref 10–40)
ANION GAP SERPL CALCULATED.3IONS-SCNC: 11 MMOL/L (ref 3–16)
AST SERPL-CCNC: 20 U/L (ref 15–37)
BASOPHILS ABSOLUTE: 0.1 K/UL (ref 0–0.2)
BASOPHILS RELATIVE PERCENT: 0.9 %
BILIRUB SERPL-MCNC: 1.2 MG/DL (ref 0–1)
BUN BLDV-MCNC: 10 MG/DL (ref 7–20)
CALCIUM SERPL-MCNC: 9.5 MG/DL (ref 8.3–10.6)
CHLORIDE BLD-SCNC: 103 MMOL/L (ref 99–110)
CHOLESTEROL, TOTAL: 154 MG/DL (ref 0–199)
CO2: 26 MMOL/L (ref 21–32)
CREAT SERPL-MCNC: 0.7 MG/DL (ref 0.6–1.2)
EOSINOPHILS ABSOLUTE: 0.1 K/UL (ref 0–0.6)
EOSINOPHILS RELATIVE PERCENT: 1.5 %
ESTIMATED AVERAGE GLUCOSE: 114 MG/DL
GFR SERPL CREATININE-BSD FRML MDRD: >60 ML/MIN/{1.73_M2}
GLUCOSE BLD-MCNC: 95 MG/DL (ref 70–99)
HBA1C MFR BLD: 5.6 %
HCT VFR BLD CALC: 37.1 % (ref 36–48)
HDLC SERPL-MCNC: 64 MG/DL (ref 40–60)
HEMOGLOBIN: 13.2 G/DL (ref 12–16)
LDL CHOLESTEROL CALCULATED: 66 MG/DL
LYMPHOCYTES ABSOLUTE: 2.1 K/UL (ref 1–5.1)
LYMPHOCYTES RELATIVE PERCENT: 28.1 %
MCH RBC QN AUTO: 30.4 PG (ref 26–34)
MCHC RBC AUTO-ENTMCNC: 35.4 G/DL (ref 31–36)
MCV RBC AUTO: 85.7 FL (ref 80–100)
MONOCYTES ABSOLUTE: 0.4 K/UL (ref 0–1.3)
MONOCYTES RELATIVE PERCENT: 5.7 %
NEUTROPHILS ABSOLUTE: 4.8 K/UL (ref 1.7–7.7)
NEUTROPHILS RELATIVE PERCENT: 63.8 %
PDW BLD-RTO: 13.6 % (ref 12.4–15.4)
PLATELET # BLD: 310 K/UL (ref 135–450)
PMV BLD AUTO: 7.6 FL (ref 5–10.5)
POTASSIUM SERPL-SCNC: 4.1 MMOL/L (ref 3.5–5.1)
RBC # BLD: 4.33 M/UL (ref 4–5.2)
SODIUM BLD-SCNC: 140 MMOL/L (ref 136–145)
TOTAL PROTEIN: 7.1 G/DL (ref 6.4–8.2)
TRIGL SERPL-MCNC: 122 MG/DL (ref 0–150)
TSH SERPL DL<=0.05 MIU/L-ACNC: 0.83 UIU/ML (ref 0.27–4.2)
VITAMIN D 25-HYDROXY: 32.9 NG/ML
VLDLC SERPL CALC-MCNC: 24 MG/DL
WBC # BLD: 7.6 K/UL (ref 4–11)

## 2023-02-28 ENCOUNTER — OFFICE VISIT (OUTPATIENT)
Dept: INTERNAL MEDICINE CLINIC | Age: 68
End: 2023-02-28
Payer: MEDICARE

## 2023-02-28 VITALS
RESPIRATION RATE: 14 BRPM | SYSTOLIC BLOOD PRESSURE: 136 MMHG | BODY MASS INDEX: 31.93 KG/M2 | OXYGEN SATURATION: 97 % | WEIGHT: 169 LBS | DIASTOLIC BLOOD PRESSURE: 68 MMHG | HEART RATE: 62 BPM

## 2023-02-28 DIAGNOSIS — I10 ESSENTIAL HYPERTENSION: Primary | ICD-10-CM

## 2023-02-28 DIAGNOSIS — E78.2 MIXED HYPERLIPIDEMIA: ICD-10-CM

## 2023-02-28 DIAGNOSIS — M19.91 PRIMARY OSTEOARTHRITIS, UNSPECIFIED SITE: ICD-10-CM

## 2023-02-28 DIAGNOSIS — E03.9 ACQUIRED HYPOTHYROIDISM: ICD-10-CM

## 2023-02-28 DIAGNOSIS — M81.0 AGE-RELATED OSTEOPOROSIS WITHOUT CURRENT PATHOLOGICAL FRACTURE: ICD-10-CM

## 2023-02-28 DIAGNOSIS — M20.42 ACQUIRED HAMMERTOE OF LEFT FOOT: ICD-10-CM

## 2023-02-28 DIAGNOSIS — R73.03 PREDIABETES: Chronic | ICD-10-CM

## 2023-02-28 PROCEDURE — 3078F DIAST BP <80 MM HG: CPT | Performed by: INTERNAL MEDICINE

## 2023-02-28 PROCEDURE — 1123F ACP DISCUSS/DSCN MKR DOCD: CPT | Performed by: INTERNAL MEDICINE

## 2023-02-28 PROCEDURE — 99214 OFFICE O/P EST MOD 30 MIN: CPT | Performed by: INTERNAL MEDICINE

## 2023-02-28 PROCEDURE — 3075F SYST BP GE 130 - 139MM HG: CPT | Performed by: INTERNAL MEDICINE

## 2023-02-28 RX ORDER — ALENDRONATE SODIUM 70 MG/1
TABLET ORAL
Qty: 12 TABLET | Refills: 3 | Status: SHIPPED | OUTPATIENT
Start: 2023-02-28

## 2023-02-28 RX ORDER — LEVOTHYROXINE SODIUM 0.1 MG/1
TABLET ORAL
Qty: 90 TABLET | Refills: 3 | Status: SHIPPED | OUTPATIENT
Start: 2023-02-28

## 2023-02-28 SDOH — ECONOMIC STABILITY: FOOD INSECURITY: WITHIN THE PAST 12 MONTHS, THE FOOD YOU BOUGHT JUST DIDN'T LAST AND YOU DIDN'T HAVE MONEY TO GET MORE.: NEVER TRUE

## 2023-02-28 SDOH — ECONOMIC STABILITY: HOUSING INSECURITY
IN THE LAST 12 MONTHS, WAS THERE A TIME WHEN YOU DID NOT HAVE A STEADY PLACE TO SLEEP OR SLEPT IN A SHELTER (INCLUDING NOW)?: NO

## 2023-02-28 SDOH — ECONOMIC STABILITY: TRANSPORTATION INSECURITY
IN THE PAST 12 MONTHS, HAS LACK OF TRANSPORTATION KEPT YOU FROM MEETINGS, WORK, OR FROM GETTING THINGS NEEDED FOR DAILY LIVING?: NO

## 2023-02-28 SDOH — ECONOMIC STABILITY: INCOME INSECURITY: HOW HARD IS IT FOR YOU TO PAY FOR THE VERY BASICS LIKE FOOD, HOUSING, MEDICAL CARE, AND HEATING?: NOT HARD AT ALL

## 2023-02-28 SDOH — ECONOMIC STABILITY: FOOD INSECURITY: WITHIN THE PAST 12 MONTHS, YOU WORRIED THAT YOUR FOOD WOULD RUN OUT BEFORE YOU GOT MONEY TO BUY MORE.: NEVER TRUE

## 2023-02-28 ASSESSMENT — PATIENT HEALTH QUESTIONNAIRE - PHQ9
1. LITTLE INTEREST OR PLEASURE IN DOING THINGS: 0
2. FEELING DOWN, DEPRESSED OR HOPELESS: 0
SUM OF ALL RESPONSES TO PHQ QUESTIONS 1-9: 0
SUM OF ALL RESPONSES TO PHQ QUESTIONS 1-9: 0
SUM OF ALL RESPONSES TO PHQ9 QUESTIONS 1 & 2: 0
SUM OF ALL RESPONSES TO PHQ QUESTIONS 1-9: 0
SUM OF ALL RESPONSES TO PHQ QUESTIONS 1-9: 0

## 2023-02-28 NOTE — PROGRESS NOTES
Audrey Lyon (:  1955) is a 68 y.o. female,Established patient, here for evaluation of the following chief complaint(s):  Follow-up      ASSESSMENT/PLAN:  1. Essential hypertension  Assessment & Plan:  Stable, controlled. Discussed calibrating home cuff or getting a new cuff since is not accurate (reading 10 points higher than manual.)  Continue current regimen. Labs up to date.   2. Age-related osteoporosis without current pathological fracture  Assessment & Plan:  DEXA  with -2.5 at lumbar spine. Family history of osteoporosis and hip fracture. Ongoing risk factor of thyroid replacement. On alendronate - tolerating. Will plan for 5 years of treatment with ongoing monitoring of bone density. Started in 2021.   Orders:  -     alendronate (FOSAMAX) 70 MG tablet; TAKE 1 TABLET BY MOUTH ONCE WEEKLY ON AN EMPTY STOMACH BEFORE BREAKFAST. REMAIN UPRIGHT FOR 30 MINUTES & TAKE WITH 8 OUNCES OF WATER, Disp-12 tablet, R-3Normal  3. Acquired hypothyroidism  Assessment & Plan:  Clinically euthyroid.  Lab Results   Component Value Date    TSH 0.83 2023    TSHREFLEX 0.41 2018      Continue current levothyroxine 100mcg daily.     Orders:  -     levothyroxine (SYNTHROID) 100 MCG tablet; TAKE 1 TABLET DAILY (DOSE DECREASE), Disp-90 tablet, R-3Normal  4. Mixed hyperlipidemia  Assessment & Plan:  Lab Results   Component Value Date    LDLCALC 66 2023     Well controlled on atorvastatin 20mg daily for primary prevention.    5. Prediabetes  Assessment & Plan:  A1C looks great on lab work. Continue healthy diet and exercise.    6. Primary osteoarthritis, unspecified site  Assessment & Plan:  Generalized. Trial voltaren gel. Tylenol prn.    7. Acquired hammertoe of left foot  -     diclofenac sodium (VOLTAREN) 1 % GEL; Apply 2 g topically 4 times daily, Topical, 4 TIMES DAILY Starting Tue 2023, Disp-100 g, R-5, Normal    Tdap at pharmacy    Return in about 6 months (around 2023) for AWV,  chronic conditions. SUBJECTIVE/OBJECTIVE:  HPI    Trying to work on portion control  Working out every day. More trouble with various arthritis. Has curved toe on left foot. BP has been high at home on home cuff. 373C systolic which has made her nervous. Review of Systems    Current Outpatient Medications on File Prior to Visit   Medication Sig Dispense Refill    alendronate (FOSAMAX) 70 MG tablet TAKE 1 TABLET BY MOUTH ONCE WEEKLY ON AN EMPTY STOMACH BEFORE BREAKFAST. REMAIN UPRIGHT FOR 30 MINUTES & TAKE WITH 8 OUNCES OF WATER 12 tablet 3    clobetasol (TEMOVATE) 0.05 % ointment Apply rash on right hand twice a day for up to 2 weeks or until improved 60 g 0    atorvastatin (LIPITOR) 20 MG tablet TAKE 1 TABLET DAILY 90 tablet 3    lisinopril-hydroCHLOROthiazide (PRINZIDE;ZESTORETIC) 10-12.5 MG per tablet TAKE 1 TABLET DAILY 90 tablet 3    amLODIPine (NORVASC) 5 MG tablet TAKE 1 TABLET DAILY 90 tablet 3    levothyroxine (SYNTHROID) 100 MCG tablet TAKE 1 TABLET DAILY (DOSE DECREASE) 90 tablet 3    Lifitegrast (XIIDRA OP) Apply to eye 1 drop twice a day in both eyes      Omega-3 Fat Ac-Cholecalciferol (DRY EYE OMEGA BENEFITS/VIT D-3) 339-376 MG-UNIT CAPS 2 capsules by mouth twice daily before meals      Propylene Glycol 0.6 % SOLN instill 1 Drop by Ophthalmic route 3-4 times every day OU      Multiple Vitamins-Minerals (MULTIVITAMIN PO) Take  by mouth daily. Calcium Carbonate-Vit D-Min (CALTRATE 600+D PLUS) 600-400 MG-UNIT TABS Take  by mouth. Indications: one by mouth twice a day       No current facility-administered medications on file prior to visit. Vitals:    02/28/23 0802   BP: 136/68   Pulse: 62   Resp:    SpO2:      Physical Exam  Constitutional:       General: She is not in acute distress. Appearance: Normal appearance. She is not diaphoretic. HENT:      Head: Normocephalic and atraumatic.       Right Ear: External ear normal.      Left Ear: External ear normal.      Nose: Nose normal.      Mouth/Throat:      Mouth: Mucous membranes are moist.      Pharynx: Oropharynx is clear. Eyes:      General: No scleral icterus. Conjunctiva/sclera: Conjunctivae normal.   Cardiovascular:      Rate and Rhythm: Normal rate and regular rhythm. Pulses: Normal pulses. Heart sounds: Normal heart sounds. No murmur heard. No friction rub. No gallop. Pulmonary:      Effort: Pulmonary effort is normal.      Breath sounds: Normal breath sounds. No wheezing, rhonchi or rales. Abdominal:      General: Abdomen is flat. Bowel sounds are normal.   Musculoskeletal:         General: No deformity. Cervical back: Normal range of motion. Right lower leg: No edema. Left lower leg: No edema. Skin:     General: Skin is warm and dry. Findings: No rash. Neurological:      General: No focal deficit present. Mental Status: She is alert. Coordination: Coordination normal.      Gait: Gait normal.   Psychiatric:         Mood and Affect: Mood normal.         Behavior: Behavior normal.         On this date 2/28/2023 I have spent 32 minutes reviewing previous notes, test results and face to face with the patient discussing the diagnosis and importance of compliance with the treatment plan as well as documenting on the day of the visit. An electronic signature was used to authenticate this note.     --Tomas Larios MD

## 2023-02-28 NOTE — ASSESSMENT & PLAN NOTE
Lab Results   Component Value Date    LDLCALC 66 02/22/2023     Well controlled on atorvastatin 20mg daily for primary prevention.

## 2023-02-28 NOTE — PATIENT INSTRUCTIONS
Flonase - fluticasone nasal spray - 2 sprays once a day for 2 weeks and then 1 spray daily for maintenance

## 2023-02-28 NOTE — ASSESSMENT & PLAN NOTE
Stable, controlled. Discussed calibrating home cuff or getting a new cuff since is not accurate (reading 10 points higher than manual.)  Continue current regimen. Labs up to date.

## 2023-02-28 NOTE — ASSESSMENT & PLAN NOTE
Clinically euthyroid.  Lab Results   Component Value Date    TSH 0.83 02/22/2023    TSHREFLEX 0.41 11/27/2018      Continue current levothyroxine 100mcg daily.

## 2023-05-29 DIAGNOSIS — I10 ESSENTIAL HYPERTENSION: ICD-10-CM

## 2023-05-30 RX ORDER — AMLODIPINE BESYLATE 5 MG/1
TABLET ORAL
Qty: 90 TABLET | Refills: 3 | Status: SHIPPED | OUTPATIENT
Start: 2023-05-30

## 2023-05-30 RX ORDER — ATORVASTATIN CALCIUM 20 MG/1
TABLET, FILM COATED ORAL
Qty: 90 TABLET | Refills: 3 | Status: SHIPPED | OUTPATIENT
Start: 2023-05-30

## 2023-05-30 RX ORDER — LISINOPRIL AND HYDROCHLOROTHIAZIDE 12.5; 1 MG/1; MG/1
TABLET ORAL
Qty: 90 TABLET | Refills: 3 | Status: SHIPPED | OUTPATIENT
Start: 2023-05-30

## 2023-06-25 SDOH — HEALTH STABILITY: PHYSICAL HEALTH: ON AVERAGE, HOW MANY DAYS PER WEEK DO YOU ENGAGE IN MODERATE TO STRENUOUS EXERCISE (LIKE A BRISK WALK)?: 5 DAYS

## 2023-06-25 SDOH — HEALTH STABILITY: PHYSICAL HEALTH: ON AVERAGE, HOW MANY MINUTES DO YOU ENGAGE IN EXERCISE AT THIS LEVEL?: 20 MIN

## 2023-06-28 ENCOUNTER — OFFICE VISIT (OUTPATIENT)
Dept: ORTHOPEDIC SURGERY | Age: 68
End: 2023-06-28

## 2023-06-28 VITALS — WEIGHT: 169 LBS | BODY MASS INDEX: 31.91 KG/M2 | HEIGHT: 61 IN

## 2023-06-28 DIAGNOSIS — M75.102 TEAR OF LEFT ROTATOR CUFF, UNSPECIFIED TEAR EXTENT, UNSPECIFIED WHETHER TRAUMATIC: ICD-10-CM

## 2023-06-28 DIAGNOSIS — M67.912 DYSFUNCTION OF LEFT ROTATOR CUFF: ICD-10-CM

## 2023-06-28 DIAGNOSIS — M25.512 LEFT SHOULDER PAIN, UNSPECIFIED CHRONICITY: Primary | ICD-10-CM

## 2023-06-28 RX ORDER — LIFITEGRAST 50 MG/ML
SOLUTION/ DROPS OPHTHALMIC
COMMUNITY
Start: 2023-06-05

## 2023-07-12 ENCOUNTER — OFFICE VISIT (OUTPATIENT)
Dept: ORTHOPEDIC SURGERY | Age: 68
End: 2023-07-12
Payer: MEDICARE

## 2023-07-12 VITALS — HEIGHT: 61 IN | WEIGHT: 169 LBS | BODY MASS INDEX: 31.91 KG/M2

## 2023-07-12 DIAGNOSIS — M75.82 TENDINITIS OF LEFT ROTATOR CUFF: ICD-10-CM

## 2023-07-12 DIAGNOSIS — M19.012 PRIMARY OSTEOARTHRITIS OF LEFT SHOULDER: ICD-10-CM

## 2023-07-12 DIAGNOSIS — M25.512 LEFT SHOULDER PAIN, UNSPECIFIED CHRONICITY: Primary | ICD-10-CM

## 2023-07-12 PROCEDURE — 99214 OFFICE O/P EST MOD 30 MIN: CPT | Performed by: PHYSICIAN ASSISTANT

## 2023-07-12 PROCEDURE — 1123F ACP DISCUSS/DSCN MKR DOCD: CPT | Performed by: PHYSICIAN ASSISTANT

## 2023-07-12 RX ORDER — MELOXICAM 15 MG/1
15 TABLET ORAL DAILY
Qty: 30 TABLET | Refills: 3 | Status: SHIPPED | OUTPATIENT
Start: 2023-07-12 | End: 2023-08-11

## 2023-07-12 NOTE — PROGRESS NOTES
Component Date Value    Vit D, 25-Hydroxy 02/22/2023 32.9     TSH 02/22/2023 0.83     Hemoglobin A1C 02/22/2023 5.6     eAG 02/22/2023 114.0     Cholesterol, Total 02/22/2023 154     Triglycerides 02/22/2023 122     HDL 02/22/2023 64 (H)     LDL Calculated 02/22/2023 66     VLDL Cholesterol Calcula* 02/22/2023 24     Sodium 02/22/2023 140     Potassium 02/22/2023 4.1     Chloride 02/22/2023 103     CO2 02/22/2023 26     Anion Gap 02/22/2023 11     Glucose 02/22/2023 95     BUN 02/22/2023 10     Creatinine 02/22/2023 0.7     Est, Glom Filt Rate 02/22/2023 >60     Calcium 02/22/2023 9.5     Total Protein 02/22/2023 7.1     Albumin 02/22/2023 4.3     Albumin/Globulin Ratio 02/22/2023 1.5     Total Bilirubin 02/22/2023 1.2 (H)     Alkaline Phosphatase 02/22/2023 68     ALT 02/22/2023 17     AST 02/22/2023 20     WBC 02/22/2023 7.6     RBC 02/22/2023 4.33     Hemoglobin 02/22/2023 13.2     Hematocrit 02/22/2023 37.1     MCV 02/22/2023 85.7     MCH 02/22/2023 30.4     MCHC 02/22/2023 35.4     RDW 02/22/2023 13.6     Platelets 73/22/7380 310     MPV 02/22/2023 7.6     Neutrophils % 02/22/2023 63.8     Lymphocytes % 02/22/2023 28.1     Monocytes % 02/22/2023 5.7     Eosinophils % 02/22/2023 1.5     Basophils % 02/22/2023 0.9     Neutrophils Absolute 02/22/2023 4.8     Lymphocytes Absolute 02/22/2023 2.1     Monocytes Absolute 02/22/2023 0.4     Eosinophils Absolute 02/22/2023 0.1     Basophils Absolute 02/22/2023 0.1       No results found for this or any previous visit (from the past 24 hour(s)). Radiographic:  MRI left shoulder  CONCLUSION:   1. Glenohumeral osteoarthropathy. Subtle regions of intermediate grade chondromalacia. SLAP   type 2A tear. Complex effusion. Regions of nodular synovitis. Sequela of inflammatory   arthropathy suspected. 2. Cuff tendinosis and hypertrophy. Mild bursitis. No macrotear.    Assessment:  Waylon Millard is a 76 y.o. female who injured her left shoulder while weightlifting

## 2023-07-13 ENCOUNTER — TELEPHONE (OUTPATIENT)
Dept: ORTHOPEDIC SURGERY | Age: 68
End: 2023-07-13

## 2023-07-13 DIAGNOSIS — M25.512 LEFT SHOULDER PAIN, UNSPECIFIED CHRONICITY: Primary | ICD-10-CM

## 2023-07-13 NOTE — TELEPHONE ENCOUNTER
General Question     Subject: PHYSICAL THERAPY ORDERS   Patient and /or Facility Request: Elliott Ojeda  Contact Number: [phone]    PT CALLING IN REGARD TO BEING SEEN ON 7- WITH RHETT CEBALLOS. PT STATES THAT SHE HAD AN MRI ON HER LT SHOULDER. PT STATES THAT ORDERS FOR PHYSICAL THERAPY WAS SUPPOSE TO BE SENT OVER [facility]  PHYSICAL THERAPY. BUT WHEN SHE WENT TO [facility]. THEY WEREN'T SENT OVER AS OF YET. PLEASE ALL BACK PT AT THE ABOVE NUMBER.

## 2023-07-21 ENCOUNTER — HOSPITAL ENCOUNTER (OUTPATIENT)
Dept: PHYSICAL THERAPY | Age: 68
Setting detail: THERAPIES SERIES
Discharge: HOME | End: 2023-07-21
Attending: ORTHOPAEDIC SURGERY
Payer: MEDICARE

## 2023-07-21 PROCEDURE — 97110 THERAPEUTIC EXERCISES: CPT

## 2023-07-21 NOTE — PLAN OF CARE
tasks   []Reduced ability to tolerate impact through UE   [x]Reduced ability to reach behind back   []Reduced ability to  or hold objects   []Reduced ability to throw or toss an object   []other:    Participation Restrictions   [x]Reduced participation in self care activities   [x]Reduced participation in home management activities   []Reduced participation in work activities   []Reduced participation in social activities. [x]Reduced participation in sport/recreation activities. Classification:   []Signs/symptoms consistent with post-surgical status including decreased ROM, strength and function.   []Signs/symptoms consistent with joint sprain/strain   []Signs/symptoms consistent with shoulder impingement   [x]Signs/symptoms consistent with shoulder/elbow/wrist tendinopathy   []Signs/symptoms consistent with Rotator cuff tear   [x]Signs/symptoms consistent with labral tear   []Signs/symptoms consistent with postural dysfunction    []Signs/symptoms consistent with Glenohumeral IR Deficit - <45 degrees   []Signs/symptoms consistent with facet dysfunction of cervical/thoracic spine    []Signs/symptoms consistent with pathology which may benefit from Dry needling     []other:     Prognosis/Rehab Potential:      []Excellent   [x]Good    []Fair   []Poor    Tolerance of evaluation/treatment:    []Excellent   [x]Good    []Fair   []Poor    Physical Therapy Evaluation Complexity Justification  [x] A history of present problem with:  [] no personal factors and/or comorbidities that impact the plan of care;  [x]1-2 personal factors and/or comorbidities that impact the plan of care  []3 personal factors and/or comorbidities that impact the plan of care  [x] An examination of body systems using standardized tests and measures addressing any of the following: body structures and functions (impairments), activity limitations, and/or participation restrictions;:  [x] a total of 1-2 or more elements   [] a total of 3 or more

## 2023-07-21 NOTE — FLOWSHEET NOTE
goals progression <30days   [] Goals require adjustment due to lack of progress  [] Patient is not progressing as expected and requires additional follow up with physician  [] Other    Persisting Functional Limitations/Impairments:  []Sitting []Standing   []Transfers  [x]Sleeping   [x]Reaching []Lifting   [x]ADLs [x]Housework  [x]Driving []Job related tasks  []Sports/Recreation []Other:    ASSESSMENT:  See eval  Treatment/Activity Tolerance:  [x] Pt able to complete treatment [] Patient limited by fatique  [] Patient limited by pain  [] Patient limited by other medical complications  [] Other:     Prognosis:  [x] Good [] Fair  [] Poor    Patient Requires Follow-up: [x] Yes  [] No    Return to Play:    [x]  N/A    Plan for next treatment session:     PLAN: See eval. PT 2x / week for 4 weeks. [] Continue per plan of care [] Alter current plan (see comments)  [x] Plan of care initiated [] Hold pending MD visit [] Discharge    Electronically signed by: Mehdi Oliveira PT, DPT      Note: If patient does not return for scheduled/ recommended follow up visits, this note will serve as a discharge from care along with most recent update on progress.

## 2023-07-25 ENCOUNTER — HOSPITAL ENCOUNTER (OUTPATIENT)
Dept: PHYSICAL THERAPY | Age: 68
Setting detail: THERAPIES SERIES
Discharge: HOME | End: 2023-07-25
Attending: ORTHOPAEDIC SURGERY
Payer: MEDICARE

## 2023-07-25 PROCEDURE — 97110 THERAPEUTIC EXERCISES: CPT

## 2023-07-25 PROCEDURE — 97140 MANUAL THERAPY 1/> REGIONS: CPT

## 2023-07-25 PROCEDURE — 97112 NEUROMUSCULAR REEDUCATION: CPT

## 2023-07-25 NOTE — FLOWSHEET NOTE
improving balance, coordination, kinesthetic sense, posture, motor skill, proprioception  to assist with  scapular, scapulothoracic and UE control with self care, reaching, carrying, lifting, house/yardwork, driving/computer work.  [] (93999) Therapist is in constant attendance of 2 or more patients providing skilled therapy interventions, but not providing any significant amount of measurable one-on-one time to either patient, for improvements in cervical, scapular, scapulothoracic and UE control with self care, reaching, carrying, lifting, house/yardwork, driving, computer work. Therapeutic Activities:    [] (73796 or 41393) Provided verbal/tactile cueing for activities related to improving balance, coordination, kinesthetic sense, posture, motor skill, proprioception and motor activation to allow for proper function of scapular, scapulothoracic and UE control with self care, carrying, lifting, driving/computer work.      Home Exercise Program:    [] (42826) Reviewed/Progressed HEP activities related to strengthening, flexibility, endurance, ROM of scapular, scapulothoracic and UE control with self care, reaching, carrying, lifting, house/yardwork, driving/computer work  [] (60191) Reviewed/Progressed HEP activities related to improving balance, coordination, kinesthetic sense, posture, motor skill, proprioception of scapular, scapulothoracic and UE control with self care, reaching, carrying, lifting, house/yardwork, driving/computer work      Manual Treatments:  PROM / STM / Oscillations-Mobs:  G-I, II, III, IV (PA's, Inf., Post.)  [x] (47958) Provided manual therapy to mobilize soft tissue/joints of cervical/CT, scapular GHJ and UE for the purpose of modulating pain, promoting relaxation,  increasing ROM, reducing/eliminating soft tissue swelling/inflammation/restriction, improving soft tissue extensibility and allowing for proper ROM for normal function with self care, reaching, carrying, lifting,

## 2023-07-28 ENCOUNTER — HOSPITAL ENCOUNTER (OUTPATIENT)
Dept: PHYSICAL THERAPY | Age: 68
Setting detail: THERAPIES SERIES
Discharge: HOME | End: 2023-07-28
Attending: ORTHOPAEDIC SURGERY
Payer: MEDICARE

## 2023-07-28 PROCEDURE — 97140 MANUAL THERAPY 1/> REGIONS: CPT

## 2023-07-28 PROCEDURE — 97110 THERAPEUTIC EXERCISES: CPT

## 2023-07-28 PROCEDURE — 97112 NEUROMUSCULAR REEDUCATION: CPT

## 2023-07-28 NOTE — FLOWSHEET NOTE
weeks  1. Pt will improve QDASH score by 10 points to reduce disability and progress towards PLOF. [] Progressing: [] Met: [] Not Met: [] Adjusted  2. Patient will demonstrate increased IR AROM to T5 to allow for proper joint functioning as indicated by patients Functional Deficits and increase ability to wash back. [] Progressing: [] Met: [] Not Met: [] Adjusted  3. Patient will demonstrate an increase in Strength to 5/5 to allow for proper functional mobility as indicated by patients Functional Deficits and improve ability to complete recreational activities. [] Progressing: [] Met: [] Not Met: [] Adjusted  4. Patient will return to functional activities including home management including sweeping without increased symptoms or restriction. [] Progressing: [] Met: [] Not Met: [] Adjusted  5. Pt will report ability to sleep 7/7 nights/wk without waking up due to pain. [] Progressing: [] Met: [] Not Met: [] Adjusted         Overall Progression Towards Functional goals/ Treatment Progress Update:  [] Patient is progressing as expected towards functional goals listed. [] Progression is slowed due to complexities/Impairments listed. [] Progression has been slowed due to co-morbidities. [x] Plan just implemented, too soon to assess goals progression <30days   [] Goals require adjustment due to lack of progress  [] Patient is not progressing as expected and requires additional follow up with physician  [] Other    Persisting Functional Limitations/Impairments:  []Sitting []Standing   []Transfers  [x]Sleeping   [x]Reaching []Lifting   [x]ADLs [x]Housework  [x]Driving []Job related tasks  []Sports/Recreation []Other:    ASSESSMENT: PROM improving slowly, able to get around 15-20 deg away from full range before pt reported pain. Issued new HO of additional HEP. Plan to continue with ROM at end ranges and strength of scap stabilizers.        Treatment/Activity Tolerance:  [x] Pt able to complete treatment []

## 2023-08-01 ENCOUNTER — HOSPITAL ENCOUNTER (OUTPATIENT)
Dept: PHYSICAL THERAPY | Age: 68
Setting detail: THERAPIES SERIES
Discharge: HOME | End: 2023-08-01
Attending: ORTHOPAEDIC SURGERY
Payer: MEDICARE

## 2023-08-01 PROCEDURE — 97110 THERAPEUTIC EXERCISES: CPT

## 2023-08-01 PROCEDURE — 97112 NEUROMUSCULAR REEDUCATION: CPT

## 2023-08-01 NOTE — FLOWSHEET NOTE
- 1 x daily - 7 x weekly - 3 sets - 10 reps  - Standing Overhead Shoulder External Rotation Stretch with Towel  - 1 x daily - 7 x weekly - 3 sets - 10 reps    7/21/23  Access Code: [email or web address]  URL: [email or web address]  Date: 07/21/2023  Prepared by: Mamadou Prieto     Exercises  - Shoulder Flexion Wall Slide with Towel  - 1 x daily - 7 x weekly - 10 reps - 10 sec hold  - Scaption Wall Slide with Towel  - 1 x daily - 7 x weekly - 10 reps - 10 sec hold  - Seated Scapular Retraction  - 1 x daily - 7 x weekly - 10 reps - 3 sec hold       Therapeutic Exercise and NMR EXR  [x] (58287) Provided verbal/tactile cueing for activities related to strengthening, flexibility, endurance, ROM  for improvements in scapular, scapulothoracic and UE control with self care, reaching, carrying, lifting, house/yardwork, driving/computer work. [x] (07832) Provided verbal/tactile cueing for activities related to improving balance, coordination, kinesthetic sense, posture, motor skill, proprioception  to assist with  scapular, scapulothoracic and UE control with self care, reaching, carrying, lifting, house/yardwork, driving/computer work.  [] (07291) Therapist is in constant attendance of 2 or more patients providing skilled therapy interventions, but not providing any significant amount of measurable one-on-one time to either patient, for improvements in cervical, scapular, scapulothoracic and UE control with self care, reaching, carrying, lifting, house/yardwork, driving, computer work. Therapeutic Activities:    [] (42362 or 03543) Provided verbal/tactile cueing for activities related to improving balance, coordination, kinesthetic sense, posture, motor skill, proprioception and motor activation to allow for proper function of scapular, scapulothoracic and UE control with self care, carrying, lifting, driving/computer work.      Home Exercise Program:    [] (48418) Reviewed/Progressed HEP activities related to strengthening,

## 2023-08-03 ENCOUNTER — HOSPITAL ENCOUNTER (OUTPATIENT)
Dept: PHYSICAL THERAPY | Age: 68
Setting detail: THERAPIES SERIES
Discharge: HOME | End: 2023-08-03
Attending: ORTHOPAEDIC SURGERY
Payer: MEDICARE

## 2023-08-03 PROCEDURE — 97110 THERAPEUTIC EXERCISES: CPT

## 2023-08-03 PROCEDURE — 97112 NEUROMUSCULAR REEDUCATION: CPT

## 2023-08-03 NOTE — FLOWSHEET NOTE
limited pain   [] Progressing: [] Met: [] Not Met: [] Adjusted     Therapist goals for Patient:   Short Term Goals: To be achieved in: 2 weeks  1. Independent in HEP and progression per patient tolerance, in order to prevent re-injury. [] Progressing: [] Met: [] Not Met: [] Adjusted  2. Patient will have a decrease in pain to facilitate improvement in movement, function, and ADLs as indicated by Functional Deficits. [] Progressing: [] Met: [] Not Met: [] Adjusted     Long Term Goals: To be achieved in: 4 weeks  1. Pt will improve QDASH score by 10 points to reduce disability and progress towards PLOF. [] Progressing: [] Met: [] Not Met: [] Adjusted  2. Patient will demonstrate increased IR AROM to T5 to allow for proper joint functioning as indicated by patients Functional Deficits and increase ability to wash back. [] Progressing: [] Met: [] Not Met: [] Adjusted  3. Patient will demonstrate an increase in Strength to 5/5 to allow for proper functional mobility as indicated by patients Functional Deficits and improve ability to complete recreational activities. [] Progressing: [] Met: [] Not Met: [] Adjusted  4. Patient will return to functional activities including home management including sweeping without increased symptoms or restriction. [] Progressing: [] Met: [] Not Met: [] Adjusted  5. Pt will report ability to sleep 7/7 nights/wk without waking up due to pain. [] Progressing: [] Met: [] Not Met: [] Adjusted         Overall Progression Towards Functional goals/ Treatment Progress Update:  [] Patient is progressing as expected towards functional goals listed. [] Progression is slowed due to complexities/Impairments listed. [] Progression has been slowed due to co-morbidities.   [x] Plan just implemented, too soon to assess goals progression <30days   [] Goals require adjustment due to lack of progress  [] Patient is not progressing as expected and requires additional follow up with

## 2023-08-08 ENCOUNTER — HOSPITAL ENCOUNTER (OUTPATIENT)
Dept: PHYSICAL THERAPY | Age: 68
Setting detail: THERAPIES SERIES
Discharge: HOME | End: 2023-08-08
Attending: ORTHOPAEDIC SURGERY
Payer: MEDICARE

## 2023-08-08 PROCEDURE — 97140 MANUAL THERAPY 1/> REGIONS: CPT

## 2023-08-08 PROCEDURE — 97110 THERAPEUTIC EXERCISES: CPT

## 2023-08-08 PROCEDURE — 97530 THERAPEUTIC ACTIVITIES: CPT

## 2023-08-08 NOTE — FLOWSHEET NOTE
[address]  Phone: [phone]   Fax: [phone]    Physical Therapy Daily Treatment Note    Date:  2023     Patient Name:  Laura Gurrola    :  1955  MRN: [id number]  Medical Diagnosis:  Left shoulder pain, unspecified chronicity [M25.512]  Treatment Diagnosis: decreased L shoulder AROM, L shoulder weakness, increased tissue tension L UT mm     Insurance/Certification information:  PT Insurance Information: [employer] - $15 CP, no ded, no auth  Physician Information:  Kimani Odom MD    Plan of care signed (Y/N): []  Yes [x]  No     Date of Patient follow up with Physician:      Progress Report: []  Yes  [x]  No     Date Range for reporting period:  Beginnin2023  Ending:     Progress report due (10 Rx/or 30 days whichever is less): visit #8 or      Recertification due (POC duration/ or 90 days whichever is less): visit #8 or 10/21/23    Visit # Insurance Allowable Auth required? Date Range    Med nec  []  Yes  [x]  No        Latex Allergy:  [x]NO      []YES  Preferred Language for Healthcare:   [x]English       []other:    Functional Scale:       Date assessed:  QDASH: raw score = 24; dysfunction = 29.55%  23    Pain level:  0/10     SUBJECTIVE:  Pt reports she is doing a lot better. Only has one area of pain in her arm right now- biceps area. OBJECTIVE:   Observation:   Test measurements:      RESTRICTIONS/PRECAUTIONS: OA, osteoporosis, HTN (borderline), TKA - July and 2019   MRI 23  1. Glenohumeral osteoarthropathy. Subtle regions of intermediate grade chondromalacia. SLAP   type 2A tear. Complex effusion. Regions of nodular synovitis. Sequela of inflammatory   arthropathy suspected. 2. Cuff tendinosis and hypertrophy. Mild bursitis. No macrotear.      Exercises/Interventions:   Therapeutic Exercise (47506) Resistance / level Sets / Seconds  Reps Notes/Cues   Flexion wall

## 2023-08-09 ENCOUNTER — OFFICE VISIT (OUTPATIENT)
Dept: ORTHOPEDIC SURGERY | Age: 68
End: 2023-08-09

## 2023-08-09 VITALS — WEIGHT: 169 LBS | HEIGHT: 61 IN | BODY MASS INDEX: 31.91 KG/M2

## 2023-08-09 DIAGNOSIS — M19.012 PRIMARY OSTEOARTHRITIS OF LEFT SHOULDER: Primary | ICD-10-CM

## 2023-08-09 RX ORDER — CYCLOSPORINE 0.5 MG/ML
EMULSION OPHTHALMIC
COMMUNITY
Start: 2023-08-02

## 2023-08-09 NOTE — PROGRESS NOTES
[address] [place] [address]  History and Physical  Shoulder Pain    Date:  2023    Name:  Yajaira Vazquez  Address:  [id number] [place] [id number]    :  1955      Age:   76 y.o.    SSN:  [id number]      Medical Record Number:  [id number]    Reason for Visit:    Shoulder Pain (F/U LEFT SHOULDER)      HPI:   Yajaira Vazquez is a 76 y.o. female who presents to our office today for follow up of the left shoulder. She has been treated for a flare up of glenohumeral arthritis with rotator cuff tendinitis. She has responded well to PT and activity modification and her symptoms are much improved. Pain Assessment  Location of Pain: Arm  Location Modifiers: Left  Severity of Pain: 1  Duration of Pain: A few minutes  Frequency of Pain: Intermittent  Aggravating Factors: Other (Comment)  Limiting Behavior: Some  Relieving Factors: Rest  Work-Related Injury: No  Are there other pain locations you wish to document?: No    No notes on file    Review of Systems:  A 14 point review of systems available in the scanned medical record as documented by the patient and reviewed on 2023. The review is negative with the exception of those things mentioned in the History of Present Illness and Past Medical History. Past Medical History:  Patient's medications, allergies, past medical, surgical, social and family histories were reviewed and updated as appropriate. Allergies: Allergies   Allergen Reactions    Tussionex Pennkinetic Er [Hydrocod Nick-Chlorphe Nick Er]     Bactrim [Sulfamethoxazole-Trimethoprim] Rash    Doxycycline Hyclate Nausea And Vomiting    Jose-Tab [Erythromycin] Nausea And Vomiting    Sulfa Antibiotics Rash       Physical Exam:  There were no vitals filed for this visit. General: Yajaira Vazquez is a healthy and well appearing 76 y.o. female who is sitting comfortably in our office.      Skin:  There are no skin lesions, cellulitis, or

## 2023-08-11 ENCOUNTER — HOSPITAL ENCOUNTER (OUTPATIENT)
Dept: PHYSICAL THERAPY | Age: 68
Setting detail: THERAPIES SERIES
Discharge: HOME | End: 2023-08-11
Attending: ORTHOPAEDIC SURGERY
Payer: MEDICARE

## 2023-08-11 PROCEDURE — 97110 THERAPEUTIC EXERCISES: CPT

## 2023-08-11 NOTE — FLOWSHEET NOTE
follow up visits, this note will serve as a discharge from care along with most recent update on progress.

## 2023-08-15 ENCOUNTER — HOSPITAL ENCOUNTER (OUTPATIENT)
Dept: PHYSICAL THERAPY | Age: 68
Setting detail: THERAPIES SERIES
Discharge: HOME | End: 2023-08-15
Attending: ORTHOPAEDIC SURGERY
Payer: MEDICARE

## 2023-08-15 PROCEDURE — 97110 THERAPEUTIC EXERCISES: CPT

## 2023-08-15 PROCEDURE — 97530 THERAPEUTIC ACTIVITIES: CPT

## 2023-08-15 NOTE — PLAN OF CARE
patients Functional Deficits and increase ability to wash back. [x] Progressing: [] Met: [] Not Met: [] Adjusted  3. Patient will demonstrate an increase in Strength to 5/5 to allow for proper functional mobility as indicated by patients Functional Deficits and improve ability to complete recreational activities. [] Progressing: [x] Met: [] Not Met: [] Adjusted  4. Patient will return to functional activities including home management including sweeping without increased symptoms or restriction. [] Progressing: [x] Met: [] Not Met: [] Adjusted  5. Pt will report ability to sleep 7/7 nights/wk without waking up due to pain. [] Progressing: [x] Met: [] Not Met: [] Adjusted         Overall Progression Towards Functional goals/ Treatment Progress Update:  [x] Patient is progressing as expected towards functional goals listed. [] Progression is slowed due to complexities/Impairments listed. [] Progression has been slowed due to co-morbidities. [] Plan just implemented, too soon to assess goals progression <30days   [] Goals require adjustment due to lack of progress  [] Patient is not progressing as expected and requires additional follow up with physician  [] Other    Persisting Functional Limitations/Impairments:  []Sitting []Standing   []Transfers  [x]Sleeping   [x]Reaching []Lifting   [x]ADLs [x]Housework  [x]Driving []Job related tasks  []Sports/Recreation []Other:    ASSESSMENT: see PT POC above     Treatment/Activity Tolerance:  [x] Pt able to complete treatment [] Patient limited by fatique  [] Patient limited by pain  [] Patient limited by other medical complications  [] Other:     Prognosis:  [x] Good [] Fair  [] Poor    Patient Requires Follow-up: [] Yes  [x] No    Return to Play:    [x]  N/A    Plan for next treatment session:     PLAN: See sherwin. PT 2x / week for 4 weeks.   [] Continue per plan of care [] Alter current plan (see comments)  [] Plan of care initiated [] Hold pending MD visit [x]

## 2023-08-16 NOTE — FLOWSHEET NOTE
Form received at [facility] on 8/16/2023.     Please note that it takes 7-10 business days for completion.    Auth received with form.     sec     R:3.24 sec   >25 SECONDS   TIMED UP And GO 12.24 sec   61-77 y/o: <9sec  66-76 y/o: <10sec  80-79 y/o: <12 sec                         RESTRICTIONS/PRECAUTIONS: Bilateral knee OA    Exercises/Interventions:   Exercise/Equipment Resistance/Repetitions Other comments 7/26/2019   Stretching      Hamstring 30\"x 5 Propped x   Hip Flexion      ITB      Grion      Quad      Inclined Calf      Towel Pull 30\"x 5 Propped x         SLR      Supine 3x 10 Standing x   Prone      Abduction 3x 10 Standing x   Adducton 2x 10\"x 10 PS; extended x   SLR+            Isometrics      Quad sets 2x 10\"x 10 A/S x         Patellar Glides      Medial 3'  x   Superior 3'  x   Inferior 3'  x         ROM      Passive 10'  10' Biodex  ERMI x  x   Active      Weight Shift      Hang Weights 10' Propped x   Sheet Pulls      Ankle Pumps 5' Hourly x         CKC      Calf raises      Wall sits      Step ups      1 leg stand      Squatting      CC TKE      Balance 2' %WS; 25-50-25 NPV         PRE      Extension  RANGE:    Flexion  RANGE:          Cable Column            Leg Press  RANGE:          Bike 5' Recumbent; AP NPV   Treadmill              Other Therapeutic Activities:   Rolling Walker: 25-50% PWB  SUPRIYA hose  Cryotherapy  3/8\" L heel lift    Home Exercise Program:   See above and attached. Initial HEP discussed and completed. Full written, verbal, and demonstration provided. Patient Education:    Full postop instructions provided. Written and verbal guidelines provided for but not limited to: DME/ HEP/ ICE/ gait/ general medical instructions. Manual Treatments:  Bandage and dressing check   5'  Patellar mobs/ STM    9'           Therapeutic Exercise and NMR EXR  [x] (74240) Provided verbal/tactile cueing for activities related to strengthening, flexibility, endurance, ROM for improvements in LE, proximal hip, and core control with self care, mobility, lifting, ambulation.   [x] (76682) Provided verbal/tactile cueing for activities related to improving balance, coordination, kinesthetic sense, posture, motor skill, proprioception  to assist with LE, proximal hip, and core control in self care, mobility, lifting, ambulation and eccentric single leg control. NMR and Therapeutic Activities:    [x] (75521 or 41547) Provided verbal/tactile cueing for activities related to improving balance, coordination, kinesthetic sense, posture, motor skill, proprioception and motor activation to allow for proper function of core, proximal hip and LE with self care and ADLs  [x] (83795) Gait Re-education- Provided training and instruction to the patient for proper LE, core and proximal hip recruitment and positioning and eccentric body weight control with ambulation re-education including up and down stairs     Home Exercise Program:    [x] (07899) Reviewed/Progressed HEP activities related to strengthening, flexibility, endurance, ROM of core, proximal hip and LE for functional self-care, mobility, lifting and ambulation/stair navigation   [x] (70102)Reviewed/Progressed HEP activities related to improving balance, coordination, kinesthetic sense, posture, motor skill, proprioception of core, proximal hip and LE for self care, mobility, lifting, and ambulation/stair navigation      Manual Treatments:  PROM / STM / Oscillations-Mobs:  G-I, II, III, IV (PA's, Inf., Post.)  [x] (66988) Provided manual therapy to mobilize LE, proximal hip and/or LS spine soft tissue/joints for the purpose of modulating pain, promoting relaxation,  increasing ROM, reducing/eliminating soft tissue swelling/inflammation/restriction, improving soft tissue extensibility and allowing for proper ROM for normal function with self care, mobility, lifting and ambulation.      Modalities:    [] hot packs  [] EMS   [] Ultrasound  [] ice   [] vasopneumatic  [x] high volt/EGS  [] phono  [] tens    [] ionto  [] autorange/biodex [] Interferential  [x] other CP top and bottom    Charges:  Timed Code Treatment Minutes: 72'   Total Treatment Minutes: 105'     [] EVAL: L2  [x] FI(69197) x  2   [] IONTO  [] NMR (27310) x      [] VASO  [x] Manual (19850) x  1    [] Other:  [x] TA x  1    [] Mech Traction (98656)  [] ES(attended) (96790)      [x] ES (un) (37884):     GOALS:  Patient stated goal: Would like to resume her walking/ fitness program     Therapist goals for Patient:   Short Term Goals: To be achieved in: 2 weeks  1. Independent in HEP and progression per patient tolerance, in order to prevent re-injury. 2. Patient will have a decrease in pain to facilitate improvement in movement, function, and ADLs as indicated by Functional Deficits.     Long Term Goals: To be achieved in: 12 weeks PO  1. Disability index score of 40% or less for the LEFS to assist with reaching prior level of function. 2. Patient will demonstrate increased AROM to 0-125-130 deg to allow for proper joint functioning as indicated by patients Functional Deficits. 3. Patient will demonstrate an increase in Strength to good proximal hip strength and control in LE to allow for proper functional mobility as indicated by patients Functional Deficits. 4. Patient will return to 60%> functional activities without increased symptoms or restriction. 5. Patient will resume a fitness program using AHA guidelines and OA precautions including, but not limited to strength and NI cardio. Progression Towards Functional goals:  [] Patient is progressing as expected towards functional goals listed. [] Progression is slowed due to complexities listed. [] Progression has been slowed due to co-morbidities.   [x] Plan just implemented, too soon to assess goals progression  [] Other:     ASSESSMENT:   Functional Impairments:                [x]Noted lumbar/proximal hip/LE hypomobility              [x]Decreased LE functional ROM              [x]Decreased core/proximal hip strength and neuromuscular control

## 2023-09-08 SDOH — HEALTH STABILITY: PHYSICAL HEALTH: ON AVERAGE, HOW MANY MINUTES DO YOU ENGAGE IN EXERCISE AT THIS LEVEL?: 30 MIN

## 2023-09-08 SDOH — HEALTH STABILITY: PHYSICAL HEALTH: ON AVERAGE, HOW MANY DAYS PER WEEK DO YOU ENGAGE IN MODERATE TO STRENUOUS EXERCISE (LIKE A BRISK WALK)?: 5 DAYS

## 2023-09-08 ASSESSMENT — LIFESTYLE VARIABLES
HOW MANY STANDARD DRINKS CONTAINING ALCOHOL DO YOU HAVE ON A TYPICAL DAY: 1 OR 2
HOW MANY STANDARD DRINKS CONTAINING ALCOHOL DO YOU HAVE ON A TYPICAL DAY: 1
HOW OFTEN DO YOU HAVE A DRINK CONTAINING ALCOHOL: MONTHLY OR LESS
HOW OFTEN DO YOU HAVE A DRINK CONTAINING ALCOHOL: 2
HOW OFTEN DO YOU HAVE SIX OR MORE DRINKS ON ONE OCCASION: 1

## 2023-09-08 ASSESSMENT — PATIENT HEALTH QUESTIONNAIRE - PHQ9
2. FEELING DOWN, DEPRESSED OR HOPELESS: 0
SUM OF ALL RESPONSES TO PHQ9 QUESTIONS 1 & 2: 0
SUM OF ALL RESPONSES TO PHQ QUESTIONS 1-9: 0
1. LITTLE INTEREST OR PLEASURE IN DOING THINGS: 0
SUM OF ALL RESPONSES TO PHQ QUESTIONS 1-9: 0

## 2023-09-11 ENCOUNTER — OFFICE VISIT (OUTPATIENT)
Dept: INTERNAL MEDICINE CLINIC | Age: 68
End: 2023-09-11

## 2023-09-11 VITALS
HEART RATE: 60 BPM | BODY MASS INDEX: 31.53 KG/M2 | SYSTOLIC BLOOD PRESSURE: 118 MMHG | HEIGHT: 61 IN | DIASTOLIC BLOOD PRESSURE: 68 MMHG | WEIGHT: 167 LBS | OXYGEN SATURATION: 98 %

## 2023-09-11 DIAGNOSIS — M81.0 AGE-RELATED OSTEOPOROSIS WITHOUT CURRENT PATHOLOGICAL FRACTURE: ICD-10-CM

## 2023-09-11 DIAGNOSIS — Z00.00 MEDICARE ANNUAL WELLNESS VISIT, SUBSEQUENT: Primary | ICD-10-CM

## 2023-09-11 DIAGNOSIS — Z78.0 POSTMENOPAUSAL: ICD-10-CM

## 2023-09-11 PROBLEM — Z63.79 STRESSFUL LIFE EVENTS AFFECTING FAMILY AND HOUSEHOLD: Status: RESOLVED | Noted: 2018-01-25 | Resolved: 2023-09-11

## 2023-09-11 PROBLEM — M17.11 PRIMARY OSTEOARTHRITIS OF RIGHT KNEE: Status: RESOLVED | Noted: 2019-07-17 | Resolved: 2023-09-11

## 2023-09-15 ENCOUNTER — HOSPITAL ENCOUNTER (OUTPATIENT)
Dept: GENERAL RADIOLOGY | Age: 68
Discharge: HOME | End: 2023-09-15
Payer: MEDICARE

## 2023-09-15 DIAGNOSIS — M81.0 AGE-RELATED OSTEOPOROSIS WITHOUT CURRENT PATHOLOGICAL FRACTURE: ICD-10-CM

## 2023-09-15 DIAGNOSIS — Z78.0 POSTMENOPAUSAL: ICD-10-CM

## 2023-09-15 PROCEDURE — 77080 DXA BONE DENSITY AXIAL: CPT

## 2023-09-26 ENCOUNTER — PATIENT MESSAGE (OUTPATIENT)
Dept: INTERNAL MEDICINE CLINIC | Age: 68
End: 2023-09-26

## 2023-09-26 NOTE — TELEPHONE ENCOUNTER
From: Tsering Gardiner  To: Dr. Layne Villavicencio: 9/26/2023 3:09 PM EDT  Subject: Mindy Masters Dr. Sergey Luciano. This is just to let you know that I had the flu and Covid vaccines today, 9/26. I plan to get RSV and tetanus in two weeks. I can bring in copies of the pharmacy forms if needed.     Va Rice

## 2023-10-06 LAB — MAMMOGRAPHY, EXTERNAL: NORMAL

## 2023-10-16 ENCOUNTER — OFFICE VISIT (OUTPATIENT)
Dept: PULMONOLOGY | Age: 68
End: 2023-10-16
Payer: MEDICARE

## 2023-10-16 VITALS
HEART RATE: 70 BPM | WEIGHT: 169.8 LBS | OXYGEN SATURATION: 97 % | SYSTOLIC BLOOD PRESSURE: 138 MMHG | DIASTOLIC BLOOD PRESSURE: 63 MMHG | BODY MASS INDEX: 32.08 KG/M2

## 2023-10-16 DIAGNOSIS — E66.09 CLASS 1 OBESITY DUE TO EXCESS CALORIES WITH SERIOUS COMORBIDITY AND BODY MASS INDEX (BMI) OF 32.0 TO 32.9 IN ADULT: ICD-10-CM

## 2023-10-16 DIAGNOSIS — I10 ESSENTIAL HYPERTENSION: ICD-10-CM

## 2023-10-16 DIAGNOSIS — G47.33 OBSTRUCTIVE SLEEP APNEA SYNDROME: Primary | ICD-10-CM

## 2023-10-16 PROCEDURE — 99214 OFFICE O/P EST MOD 30 MIN: CPT | Performed by: NURSE PRACTITIONER

## 2023-10-16 PROCEDURE — 1123F ACP DISCUSS/DSCN MKR DOCD: CPT | Performed by: NURSE PRACTITIONER

## 2023-10-16 PROCEDURE — 3078F DIAST BP <80 MM HG: CPT | Performed by: NURSE PRACTITIONER

## 2023-10-16 PROCEDURE — 3075F SYST BP GE 130 - 139MM HG: CPT | Performed by: NURSE PRACTITIONER

## 2023-10-16 ASSESSMENT — SLEEP AND FATIGUE QUESTIONNAIRES
HOW LIKELY ARE YOU TO NOD OFF OR FALL ASLEEP WHILE SITTING INACTIVE IN A PUBLIC PLACE: 0
HOW LIKELY ARE YOU TO NOD OFF OR FALL ASLEEP WHILE WATCHING TV: 1
ESS TOTAL SCORE: 7
HOW LIKELY ARE YOU TO NOD OFF OR FALL ASLEEP WHILE SITTING AND READING: 1
HOW LIKELY ARE YOU TO NOD OFF OR FALL ASLEEP WHILE SITTING QUIETLY AFTER LUNCH WITHOUT ALCOHOL: 2
HOW LIKELY ARE YOU TO NOD OFF OR FALL ASLEEP WHEN YOU ARE A PASSENGER IN A CAR FOR AN HOUR WITHOUT A BREAK: 0
HOW LIKELY ARE YOU TO NOD OFF OR FALL ASLEEP WHILE SITTING AND TALKING TO SOMEONE: 0
HOW LIKELY ARE YOU TO NOD OFF OR FALL ASLEEP WHILE LYING DOWN TO REST IN THE AFTERNOON WHEN CIRCUMSTANCES PERMIT: 3
HOW LIKELY ARE YOU TO NOD OFF OR FALL ASLEEP IN A CAR, WHILE STOPPED FOR A FEW MINUTES IN TRAFFIC: 0

## 2023-10-16 NOTE — ASSESSMENT & PLAN NOTE
Chronic - Stable: Reviewed and analyzed results of physiologic download from patient's machine and reviewed with patients. Continues to do well with her machine. She is compliant and getting good symptom control. Supplies and parts as needed for the machine. These are medically necessary. Limit caffeine use after 3 PM. Based on the analyzed data, we will continue with current settings. Discussed trial of a different style of mask/headgear for comfort. Encouraged her to continue to use her machine each night. She will return in one year for follow up, but she was instructed to return sooner or contact the office if experience new or worsening of symptoms. Encouraged the patient to contact the office with any questions or concerns.

## 2023-10-16 NOTE — PROGRESS NOTES
Marcia Dick CNP [facility] [address] [facility] 28, [address] [facility] [phone]   [address]  [address] [phone] MEDICINE  [facility] [place] [address]  Dept: [phone]  Dept Fax: [phone]  Loc: [phone]      Assessment/Plan:      1. Obstructive sleep apnea syndrome  Assessment & Plan:   Chronic - Stable: Reviewed and analyzed results of physiologic download from patient's machine and reviewed with patients. Continues to do well with her machine. She is compliant and getting good symptom control. Supplies and parts as needed for the machine. These are medically necessary. Limit caffeine use after 3 PM. Based on the analyzed data, we will continue with current settings. Discussed trial of a different style of mask/headgear for comfort. Encouraged her to continue to use her machine each night. She will return in one year for follow up, but she was instructed to return sooner or contact the office if experience new or worsening of symptoms. Encouraged the patient to contact the office with any questions or concerns. 2. Essential hypertension  Assessment & Plan:  Chronic- Stable. Discussed the importance of treating sleep apnea as part of the management of this disorder. Cont any meds per PCP and other physicians. 3. Class 1 obesity due to excess calories with serious comorbidity and body mass index (BMI) of 32.0 to 32.9 in adult  Assessment & Plan:   Chronic-not stable:  Discussed importance of treating obstructive sleep apnea and getting sufficient sleep to assist with weight control. Encouraged her to work on weight loss through diet and exercise. Recommended DASH or Mediterranean diets. Reviewed, analyzed, and documented physiologic data from patient's PAP machine.     This information was

## 2023-11-01 ENCOUNTER — PATIENT MESSAGE (OUTPATIENT)
Dept: INTERNAL MEDICINE CLINIC | Age: 68
End: 2023-11-01

## 2023-11-01 DIAGNOSIS — R13.19 ESOPHAGEAL DYSPHAGIA: Primary | ICD-10-CM

## 2023-11-02 NOTE — TELEPHONE ENCOUNTER
From: Santosh Parada  To: Dr. Ana Feldman: 11/1/2023 3:17 PM EDT  Subject: Fosamax side effect question    Hi Dr. Ricky Pascual. A few weeks ago, I started occasionally having issues with food seeming to be blocked in my esophagus. It takes a little while for the food to move on. Never happened before, and I thought maybe I was eating too fast. That doesn't seem to be the issue. No other symptoms, and no pain associated with it. I'm guessing it's likely old age related digestive tract changes, but I'm concerned about possible cumulative effects of Fosamax. I did not take my dose this past weekend, but I will resume next weekend if you think I should as I know this has been beneficial for my osteoporosis. I don't think there is any other issue as that is the only \"symptom\", and it's random. Let me know if you think I'm seeing problems where none exist and just need to refine my eating habits.     Thanks,  Rosaria Curling

## 2024-02-01 DIAGNOSIS — M81.0 AGE-RELATED OSTEOPOROSIS WITHOUT CURRENT PATHOLOGICAL FRACTURE: ICD-10-CM

## 2024-02-01 RX ORDER — ALENDRONATE SODIUM 70 MG/1
TABLET ORAL
Qty: 12 TABLET | Refills: 3 | Status: SHIPPED | OUTPATIENT
Start: 2024-02-01

## 2024-02-01 NOTE — TELEPHONE ENCOUNTER
Medication:   Requested Prescriptions     Pending Prescriptions Disp Refills    alendronate (FOSAMAX) 70 MG tablet [Pharmacy Med Name: ALENDRONATE SODIUM TABS 4'S 70MG] 12 tablet 3     Sig: TAKE 1 TABLET ONCE WEEKLY ON AN EMPTY STOMACH BEFORE BREAKFAST REMAIN UPRIGHT FOR 30 MINUTES AND TAKE WITH 8 OUNCES OF WATER     Last Filled:  2/28/23    Last appt: 9/11/2023   Next appt: 3/7/2024    Last OARRS:        No data to display

## 2024-02-23 DIAGNOSIS — E03.9 ACQUIRED HYPOTHYROIDISM: ICD-10-CM

## 2024-02-23 RX ORDER — LEVOTHYROXINE SODIUM 0.1 MG/1
TABLET ORAL
Qty: 90 TABLET | Refills: 3 | Status: SHIPPED | OUTPATIENT
Start: 2024-02-23

## 2024-02-23 NOTE — TELEPHONE ENCOUNTER
Refill Request     Last Seen: 9/11/2023    Last Written: 2/28/23    Next Appointment:   Future Appointments   Date Time Provider Department Center   3/7/2024  9:00 AM Nadya Larios MD [facility]  [place] - [facility]   10/16/2024  9:00 AM Marlene Dick APRN - CNP [facility] SLEEP [facility]             Requested Prescriptions     Pending Prescriptions Disp Refills    levothyroxine (SYNTHROID) 100 MCG tablet [Pharmacy Med Name: L-THYROXINE (SYNTHROID) TABS 100MCG] 90 tablet 3     Sig: TAKE 1 TABLET DAILY

## 2024-03-07 ENCOUNTER — OFFICE VISIT (OUTPATIENT)
Dept: INTERNAL MEDICINE CLINIC | Age: 69
End: 2024-03-07

## 2024-03-07 VITALS
WEIGHT: 170 LBS | SYSTOLIC BLOOD PRESSURE: 134 MMHG | BODY MASS INDEX: 32.12 KG/M2 | HEART RATE: 54 BPM | OXYGEN SATURATION: 98 % | DIASTOLIC BLOOD PRESSURE: 60 MMHG

## 2024-03-07 DIAGNOSIS — R73.03 PREDIABETES: Chronic | ICD-10-CM

## 2024-03-07 DIAGNOSIS — Z13.220 ENCOUNTER FOR LIPID SCREENING FOR CARDIOVASCULAR DISEASE: ICD-10-CM

## 2024-03-07 DIAGNOSIS — E03.9 ACQUIRED HYPOTHYROIDISM: Chronic | ICD-10-CM

## 2024-03-07 DIAGNOSIS — E55.9 VITAMIN D DEFICIENCY: ICD-10-CM

## 2024-03-07 DIAGNOSIS — I46.9 ASYSTOLE (HCC): ICD-10-CM

## 2024-03-07 DIAGNOSIS — M81.0 AGE-RELATED OSTEOPOROSIS WITHOUT CURRENT PATHOLOGICAL FRACTURE: ICD-10-CM

## 2024-03-07 DIAGNOSIS — I10 ESSENTIAL HYPERTENSION: Chronic | ICD-10-CM

## 2024-03-07 DIAGNOSIS — Z13.6 ENCOUNTER FOR LIPID SCREENING FOR CARDIOVASCULAR DISEASE: ICD-10-CM

## 2024-03-07 DIAGNOSIS — M81.0 AGE-RELATED OSTEOPOROSIS WITHOUT CURRENT PATHOLOGICAL FRACTURE: Primary | ICD-10-CM

## 2024-03-07 DIAGNOSIS — R09.82 POSTNASAL DRIP: ICD-10-CM

## 2024-03-07 LAB
25(OH)D3 SERPL-MCNC: 36.2 NG/ML
ALBUMIN SERPL-MCNC: 4.4 G/DL (ref 3.4–5)
ALBUMIN/GLOB SERPL: 1.6 {RATIO} (ref 1.1–2.2)
ALP SERPL-CCNC: 74 U/L (ref 40–129)
ALT SERPL-CCNC: 36 U/L (ref 10–40)
ANION GAP SERPL CALCULATED.3IONS-SCNC: 11 MMOL/L (ref 3–16)
AST SERPL-CCNC: 32 U/L (ref 15–37)
BASOPHILS # BLD: 0.1 K/UL (ref 0–0.2)
BASOPHILS NFR BLD: 0.5 %
BILIRUB SERPL-MCNC: 0.8 MG/DL (ref 0–1)
BUN SERPL-MCNC: 17 MG/DL (ref 7–20)
CALCIUM SERPL-MCNC: 10.2 MG/DL (ref 8.3–10.6)
CHLORIDE SERPL-SCNC: 99 MMOL/L (ref 99–110)
CHOLEST SERPL-MCNC: 160 MG/DL (ref 0–199)
CO2 SERPL-SCNC: 27 MMOL/L (ref 21–32)
CREAT SERPL-MCNC: 0.7 MG/DL (ref 0.6–1.2)
DEPRECATED RDW RBC AUTO: 12.7 % (ref 12.4–15.4)
EOSINOPHIL # BLD: 0.1 K/UL (ref 0–0.6)
EOSINOPHIL NFR BLD: 1.3 %
GFR SERPLBLD CREATININE-BSD FMLA CKD-EPI: >60 ML/MIN/{1.73_M2}
GLUCOSE SERPL-MCNC: 86 MG/DL (ref 70–99)
HCT VFR BLD AUTO: 36.9 % (ref 36–48)
HDLC SERPL-MCNC: 52 MG/DL (ref 40–60)
HGB BLD-MCNC: 13.1 G/DL (ref 12–16)
LDLC SERPL CALC-MCNC: 78 MG/DL
LYMPHOCYTES # BLD: 2.6 K/UL (ref 1–5.1)
LYMPHOCYTES NFR BLD: 27.1 %
MCH RBC QN AUTO: 31.6 PG (ref 26–34)
MCHC RBC AUTO-ENTMCNC: 35.5 G/DL (ref 31–36)
MCV RBC AUTO: 88.9 FL (ref 80–100)
MONOCYTES # BLD: 0.5 K/UL (ref 0–1.3)
MONOCYTES NFR BLD: 5.5 %
NEUTROPHILS # BLD: 6.3 K/UL (ref 1.7–7.7)
NEUTROPHILS NFR BLD: 65.6 %
PLATELET # BLD AUTO: 368 K/UL (ref 135–450)
PMV BLD AUTO: 7.5 FL (ref 5–10.5)
POTASSIUM SERPL-SCNC: 4 MMOL/L (ref 3.5–5.1)
PROT SERPL-MCNC: 7.1 G/DL (ref 6.4–8.2)
RBC # BLD AUTO: 4.15 M/UL (ref 4–5.2)
SODIUM SERPL-SCNC: 137 MMOL/L (ref 136–145)
TRIGL SERPL-MCNC: 151 MG/DL (ref 0–150)
TSH SERPL DL<=0.005 MIU/L-ACNC: 1.3 UIU/ML (ref 0.27–4.2)
VLDLC SERPL CALC-MCNC: 30 MG/DL
WBC # BLD AUTO: 9.6 K/UL (ref 4–11)

## 2024-03-07 RX ORDER — FLUTICASONE PROPIONATE 50 MCG
2 SPRAY, SUSPENSION (ML) NASAL DAILY
Qty: 16 G | Refills: 5 | COMMUNITY
Start: 2024-03-07

## 2024-03-07 SDOH — ECONOMIC STABILITY: FOOD INSECURITY: WITHIN THE PAST 12 MONTHS, THE FOOD YOU BOUGHT JUST DIDN'T LAST AND YOU DIDN'T HAVE MONEY TO GET MORE.: NEVER TRUE

## 2024-03-07 SDOH — ECONOMIC STABILITY: FOOD INSECURITY: WITHIN THE PAST 12 MONTHS, YOU WORRIED THAT YOUR FOOD WOULD RUN OUT BEFORE YOU GOT MONEY TO BUY MORE.: NEVER TRUE

## 2024-03-07 SDOH — ECONOMIC STABILITY: INCOME INSECURITY: HOW HARD IS IT FOR YOU TO PAY FOR THE VERY BASICS LIKE FOOD, HOUSING, MEDICAL CARE, AND HEATING?: NOT HARD AT ALL

## 2024-03-07 ASSESSMENT — PATIENT HEALTH QUESTIONNAIRE - PHQ9
SUM OF ALL RESPONSES TO PHQ QUESTIONS 1-9: 0
SUM OF ALL RESPONSES TO PHQ QUESTIONS 1-9: 0
SUM OF ALL RESPONSES TO PHQ9 QUESTIONS 1 & 2: 0
1. LITTLE INTEREST OR PLEASURE IN DOING THINGS: 0
2. FEELING DOWN, DEPRESSED OR HOPELESS: 0
SUM OF ALL RESPONSES TO PHQ QUESTIONS 1-9: 0
SUM OF ALL RESPONSES TO PHQ QUESTIONS 1-9: 0

## 2024-03-07 NOTE — ASSESSMENT & PLAN NOTE
DEXA 8/21 with -2.5 at lumbar spine. Family history of osteoporosis and hip fracture. Ongoing risk factor of thyroid replacement. Had been on alendronate but had some difficulty swallowing which resolved with stopping the alendronate. She did have and EGD which was unrevealing. May trial back on alendronate. She will think about it. Discussed reclast and prolia today also.  Repeat DEXA in 7/2023 with improvement in T score at l spine to -2.1

## 2024-03-07 NOTE — ASSESSMENT & PLAN NOTE
Stable, controlled on amlodipine 5mg daily and lisinopril-HCTZ 10-12.5mg daily.   Continue current regimen. Labs today   Home

## 2024-03-07 NOTE — ASSESSMENT & PLAN NOTE
Operative- for 10 seconds, resolved with reduction in abdominal pressure. Has done okay with subsequent surgeries.

## 2024-03-07 NOTE — PROGRESS NOTES
Audrey Lyon (:  1955) is a 69 y.o. female,Established patient, here for evaluation of the following chief complaint(s):  6 Month Follow-Up      ASSESSMENT/PLAN:  1. Age-related osteoporosis without current pathological fracture  Assessment & Plan:  DEXA  with -2.5 at lumbar spine. Family history of osteoporosis and hip fracture. Ongoing risk factor of thyroid replacement. Had been on alendronate but had some difficulty swallowing which resolved with stopping the alendronate. She did have and EGD which was unrevealing. May trial back on alendronate. She will think about it. Discussed reclast and prolia today also.  Repeat DEXA in 2023 with improvement in T score at l spine to -2.1  Orders:  -     Vitamin D 25 Hydroxy; Future  2. Asystole (HCC)  Assessment & Plan:  Operative- for 10 seconds, resolved with reduction in abdominal pressure. Has done okay with subsequent surgeries.     3. Acquired hypothyroidism  -     TSH; Future  4. Essential hypertension  Assessment & Plan:  Stable, controlled on amlodipine 5mg daily and lisinopril-HCTZ 10-12.5mg daily.   Continue current regimen. Labs today  Orders:  -     CBC with Auto Differential; Future  -     Comprehensive Metabolic Panel; Future  5. Postnasal drip  -     fluticasone (FLONASE) 50 MCG/ACT nasal spray; 2 sprays by Each Nostril route daily For 2 weeks and then decrease to 1 spray daily., Disp-16 g, R-5OTC  6. Prediabetes  -     Hemoglobin A1C; Future  7. Encounter for lipid screening for cardiovascular disease  -     Lipid Panel; Future  8. Vitamin D deficiency  -     Vitamin D 25 Hydroxy; Future      Return in about 6 months (around 2024) for AWV.    SUBJECTIVE/OBJECTIVE:  HPI    Swallowing is better off of alendronate.     Review of Systems    Current Outpatient Medications on File Prior to Visit   Medication Sig Dispense Refill    levothyroxine (SYNTHROID) 100 MCG tablet TAKE 1 TABLET DAILY 90 tablet 3    cycloSPORINE (RESTASIS) 0.05 %

## 2024-03-08 LAB
EST. AVERAGE GLUCOSE BLD GHB EST-MCNC: 114 MG/DL
HBA1C MFR BLD: 5.6 %

## 2024-05-30 ENCOUNTER — TELEPHONE (OUTPATIENT)
Dept: INTERNAL MEDICINE CLINIC | Age: 69
End: 2024-05-30

## 2024-05-30 DIAGNOSIS — I10 ESSENTIAL HYPERTENSION: ICD-10-CM

## 2024-05-30 RX ORDER — AMLODIPINE BESYLATE 5 MG/1
5 TABLET ORAL DAILY
Qty: 90 TABLET | Refills: 0 | Status: SHIPPED | OUTPATIENT
Start: 2024-05-30

## 2024-05-30 RX ORDER — ATORVASTATIN CALCIUM 20 MG/1
20 TABLET, FILM COATED ORAL DAILY
Qty: 90 TABLET | Refills: 0 | Status: SHIPPED | OUTPATIENT
Start: 2024-05-30

## 2024-05-30 NOTE — TELEPHONE ENCOUNTER
Kacy from [employer] called in regards to getting a medication refill for patient of Dr. Larios, the medications that needs to be refilled:    amLODIPine (NORVASC) 5 MG tablet     Last appointment: 3/7/2024  Next appointment: Visit date not found  Last refill: 5/30/203      atorvastatin (LIPITOR) 20 MG tablet     Last appointment: 3/7/2024  Next appointment: Visit date not found  Last refill: 5/30/2023       Kacy did leave a reference# regarding these orders: [id number].    Patients preferred Pharmacy:    [facility] - [address] -  [phone] - F [phone]  [address] [place] [id number]  Phone: [phone] Fax: [phone]

## 2024-08-21 DIAGNOSIS — I10 ESSENTIAL HYPERTENSION: ICD-10-CM

## 2024-08-21 RX ORDER — ATORVASTATIN CALCIUM 20 MG/1
20 TABLET, FILM COATED ORAL DAILY
Qty: 90 TABLET | Refills: 3 | OUTPATIENT
Start: 2024-08-21

## 2024-08-21 RX ORDER — AMLODIPINE BESYLATE 5 MG/1
5 TABLET ORAL DAILY
Qty: 90 TABLET | Refills: 3 | OUTPATIENT
Start: 2024-08-21

## 2024-10-15 ASSESSMENT — SLEEP AND FATIGUE QUESTIONNAIRES
HOW LIKELY ARE YOU TO NOD OFF OR FALL ASLEEP IN A CAR, WHILE STOPPED FOR A FEW MINUTES IN TRAFFIC: WOULD NEVER DOZE
HOW LIKELY ARE YOU TO NOD OFF OR FALL ASLEEP WHILE LYING DOWN TO REST IN THE AFTERNOON WHEN CIRCUMSTANCES PERMIT: HIGH CHANCE OF DOZING
HOW LIKELY ARE YOU TO NOD OFF OR FALL ASLEEP WHILE SITTING QUIETLY AFTER LUNCH WITHOUT ALCOHOL: SLIGHT CHANCE OF DOZING
HOW LIKELY ARE YOU TO NOD OFF OR FALL ASLEEP WHILE SITTING AND READING: MODERATE CHANCE OF DOZING
HOW LIKELY ARE YOU TO NOD OFF OR FALL ASLEEP WHILE WATCHING TV: MODERATE CHANCE OF DOZING
HOW LIKELY ARE YOU TO NOD OFF OR FALL ASLEEP WHILE SITTING AND TALKING TO SOMEONE: WOULD NEVER DOZE
HOW LIKELY ARE YOU TO NOD OFF OR FALL ASLEEP WHILE SITTING AND TALKING TO SOMEONE: WOULD NEVER DOZE
HOW LIKELY ARE YOU TO NOD OFF OR FALL ASLEEP WHILE SITTING AND READING: MODERATE CHANCE OF DOZING
HOW LIKELY ARE YOU TO NOD OFF OR FALL ASLEEP WHILE WATCHING TV: MODERATE CHANCE OF DOZING
HOW LIKELY ARE YOU TO NOD OFF OR FALL ASLEEP WHILE SITTING QUIETLY AFTER LUNCH WITHOUT ALCOHOL: SLIGHT CHANCE OF DOZING
HOW LIKELY ARE YOU TO NOD OFF OR FALL ASLEEP WHEN YOU ARE A PASSENGER IN A CAR FOR AN HOUR WITHOUT A BREAK: WOULD NEVER DOZE
HOW LIKELY ARE YOU TO NOD OFF OR FALL ASLEEP IN A CAR, WHILE STOPPED FOR A FEW MINUTES IN TRAFFIC: WOULD NEVER DOZE
ESS TOTAL SCORE: 9
HOW LIKELY ARE YOU TO NOD OFF OR FALL ASLEEP WHILE LYING DOWN TO REST IN THE AFTERNOON WHEN CIRCUMSTANCES PERMIT: HIGH CHANCE OF DOZING
HOW LIKELY ARE YOU TO NOD OFF OR FALL ASLEEP WHILE SITTING INACTIVE IN A PUBLIC PLACE: SLIGHT CHANCE OF DOZING
HOW LIKELY ARE YOU TO NOD OFF OR FALL ASLEEP WHILE SITTING INACTIVE IN A PUBLIC PLACE: SLIGHT CHANCE OF DOZING
HOW LIKELY ARE YOU TO NOD OFF OR FALL ASLEEP WHEN YOU ARE A PASSENGER IN A CAR FOR AN HOUR WITHOUT A BREAK: WOULD NEVER DOZE

## 2024-10-16 ENCOUNTER — OFFICE VISIT (OUTPATIENT)
Dept: PULMONOLOGY | Age: 69
End: 2024-10-16
Payer: MEDICARE

## 2024-10-16 VITALS
SYSTOLIC BLOOD PRESSURE: 124 MMHG | HEIGHT: 61 IN | OXYGEN SATURATION: 99 % | BODY MASS INDEX: 31.53 KG/M2 | DIASTOLIC BLOOD PRESSURE: 68 MMHG | WEIGHT: 167 LBS | HEART RATE: 60 BPM

## 2024-10-16 DIAGNOSIS — G47.33 OBSTRUCTIVE SLEEP APNEA SYNDROME: Primary | Chronic | ICD-10-CM

## 2024-10-16 DIAGNOSIS — E66.811 CLASS 1 OBESITY DUE TO EXCESS CALORIES WITH SERIOUS COMORBIDITY AND BODY MASS INDEX (BMI) OF 32.0 TO 32.9 IN ADULT: Chronic | ICD-10-CM

## 2024-10-16 DIAGNOSIS — E66.09 CLASS 1 OBESITY DUE TO EXCESS CALORIES WITH SERIOUS COMORBIDITY AND BODY MASS INDEX (BMI) OF 32.0 TO 32.9 IN ADULT: Chronic | ICD-10-CM

## 2024-10-16 DIAGNOSIS — I10 ESSENTIAL HYPERTENSION: Chronic | ICD-10-CM

## 2024-10-16 PROCEDURE — 3078F DIAST BP <80 MM HG: CPT | Performed by: NURSE PRACTITIONER

## 2024-10-16 PROCEDURE — 1123F ACP DISCUSS/DSCN MKR DOCD: CPT | Performed by: NURSE PRACTITIONER

## 2024-10-16 PROCEDURE — G2211 COMPLEX E/M VISIT ADD ON: HCPCS | Performed by: NURSE PRACTITIONER

## 2024-10-16 PROCEDURE — 99214 OFFICE O/P EST MOD 30 MIN: CPT | Performed by: NURSE PRACTITIONER

## 2024-10-16 PROCEDURE — 3074F SYST BP LT 130 MM HG: CPT | Performed by: NURSE PRACTITIONER

## 2024-10-16 NOTE — PROGRESS NOTES
Braydon Hood [facility]  [address]  [address]  [place] [address]  P- [phone]  F- [phone]   [facility] [place] [facility] SLEEP MEDICINE  [address]  [address]  [facility] [address]  Dept: [phone]  Dept Fax: [phone]  Loc: [phone]      Assessment/Plan:      1. Obstructive sleep apnea syndrome  Assessment & Plan:  Chronic-Stable: Reviewed and analyzed results of physiologic download from patient's machine and reviewed with patient.  Supplies and parts as needed for her machine.  These are medically necessary.  Limit caffeine use after 3pm. Based on the analyzed data will continue with current settings. Stable on her machine at current settings, getting benefit from the use, and having minimal side effects.  Encouraged her to contact her equipment company if she would like to try different style mask.  Will see her back in 1 year.  Encouraged her to contact the office with any questions or concerns.    2. Essential hypertension  Assessment & Plan:  Chronic- Stable.  Discussed the importance of treating obstructive sleep apnea as part of the management of this disorder.  Cont any meds per PCP and other physicians.    3. Class 1 obesity due to excess calories with serious comorbidity and body mass index (BMI) of 32.0 to 32.9 in adult  Assessment & Plan:  Chronic-not stable:  Discussed importance of treating obstructive sleep apnea and getting sufficient sleep to assist with weight control.  Discussed weight gain and/or weight loss may require adjustments to machine settings. Encouraged her to work on weight loss through diet and exercise.         Reviewed, analyzed, and documented physiologic data from patient's PAP machine.    This information was analyzed to assess complexity and medical decision making in regards to further testing and management.    The primary encounter diagnosis was Obstructive sleep

## 2024-10-16 NOTE — PROGRESS NOTES
Diagnosis: [x] RAMANDEEP (G47.33) [] CSA (G47.31) [] Apnea (G47.30)   Length of Need: [x] 15 Months [] 99 Months [] Other:   Machine (ARLYN!): [] Respironics Dream Station      Auto [] ResMed AirSense     Auto [] Other:     []  CPAP () [] Bilevel ()   Mode: [] Auto [] Spontaneous    Mode: [] Auto [] Spontaneous            Comfort Settings:      Humidifier: [] Heated ()        [x] Water chamber replacement ()/ 1 per 6 months        Mask:   [x] Nasal () /1 per 3 months [] Full Face () /1 per 3 months   [x] Patient choice -Size and fit mask [] Patient Choice - Size and fit mask   [] Dispense: [] Dispense:   [x] Headgear () / 1 per 3 months [] Headgear () / 1 per 3 months   [x] Replacement Nasal Cushion ()/2 per month [] Interface Replacement ()/1 per month   [] Replacement Nasal Pillows ()/2 per month         Tubing: [x] Heated ()/1 per 3 months    [] Standard ()/1 per 3 months [] Other:           Filters: [x] Non-disposable ()/1 per 6 months     [x] Ultra-Fine, Disposable ()/2 per month        Miscellaneous: [] Chin Strap ()/ 1 per 6 months [] O2 bleed-in:        LPM   [] Oxymetry on CPAP/Bilevel []  Other:         Start Order Date: 10/16/24    MEDICAL JUSTIFICATION:  I, the undersigned, certify that the above prescribed supplies are medically necessary for this patient’s wellbeing.  In my opinion, the supplies are both reasonable and necessary in reference to accepted standards of medicalpractice in treatment of this patient’s condition.    STUART DE LA O NP    NPI: [id number]       Order Signed Date: 10/16/24  [facility]  Pulmonary, Sleep, and Critical Care    Pulmonary, Sleep, and Critical Care  [facility] [address]                          [address] [place] [address] [address]  [place] [address]                                    [place] [address]  Phone: [phone]    Fax:

## 2024-10-16 NOTE — ASSESSMENT & PLAN NOTE
Chronic-Stable: Reviewed and analyzed results of physiologic download from patient's machine and reviewed with patient.  Supplies and parts as needed for her machine.  These are medically necessary.  Limit caffeine use after 3pm. Based on the analyzed data will continue with current settings. Stable on her machine at current settings, getting benefit from the use, and having minimal side effects.  Encouraged her to contact her equipment company if she would like to try different style mask.  Will see her back in 1 year.  Encouraged her to contact the office with any questions or concerns.

## (undated) DEVICE — 3M™ COBAN™ NL STERILE NON-LATEX SELF-ADHERENT WRAP, 2086S, 6 IN X 5 YD (15 CM X 4,5 M), 12 ROLLS/CASE: Brand: 3M™ COBAN™

## (undated) DEVICE — Z INACTIVE NO SUPPLIER SOLUTIONIRRIG 3000ML 0.9% SOD CHL FLX CONT [79720808] [HOSPIRA WORLDWIDE INC]

## (undated) DEVICE — SURE SET-DOUBLE BASIN-LF: Brand: MEDLINE INDUSTRIES, INC.

## (undated) DEVICE — DECANTER BAG 9": Brand: MEDLINE INDUSTRIES, INC.

## (undated) DEVICE — 60 ML SYRINGE,CATHETER TIP: Brand: MONOJECT

## (undated) DEVICE — SOLUTION IV 1000ML 0.9% SOD CHL

## (undated) DEVICE — ORTHO PRE OP PACK: Brand: MEDLINE INDUSTRIES, INC.

## (undated) DEVICE — E-Z CLEAN, NON-STICK, PTFE COATED, ELECTROSURGICAL BLADE ELECTRODE, 2.5 INCH (6.35 CM): Brand: EZ CLEAN

## (undated) DEVICE — SYRINGE, LUER LOCK, 10ML: Brand: MEDLINE

## (undated) DEVICE — SUTURE ETHBND EXCEL SZ 0 L18IN NONABSORBABLE GRN L36MM CT-1 CX21D

## (undated) DEVICE — 1.5MM WIRE PASS DRILL BIT

## (undated) DEVICE — SUTURE VCRL UD BR CT 3-0 18IN CT1 J838D

## (undated) DEVICE — DUAL CUT SAGITTAL BLADE

## (undated) DEVICE — YANKAUER,OPEN TIP,W/O VENT,STERILE: Brand: MEDLINE INDUSTRIES, INC.

## (undated) DEVICE — 450 ML BOTTLE OF 0.05% CHLORHEXIDINE GLUCONATE IN 99.95% STERILE WATER FOR IRRIGATION, USP AND APPLICATOR.: Brand: IRRISEPT ANTIMICROBIAL WOUND LAVAGE

## (undated) DEVICE — COVER LT HNDL BLU PLAS

## (undated) DEVICE — PENCIL ES ULT VAC W TELSCP NOSE EZ CLN BLDE 10FT

## (undated) DEVICE — GLOVE SURG SZ 9 L12IN FNGR THK87MIL DK GRN LTX POLYMER W

## (undated) DEVICE — 2108 SERIES SAGITTAL BLADE (19.5 X 1.27 X 90.0MM)

## (undated) DEVICE — PLATE ES AD W 9FT CRD 2

## (undated) DEVICE — GOWN,SIRUS,POLYRNF,BRTHSLV,XL,30/CS: Brand: MEDLINE

## (undated) DEVICE — SST TWIST DRILL, STANDARD, 3.2MM DIA. X 127MM: Brand: MICROAIRE®

## (undated) DEVICE — CHLORAPREP 26ML ORANGE

## (undated) DEVICE — HIGH FLOW TIP

## (undated) DEVICE — RIMMED SPEED PIN 45MM STERILE

## (undated) DEVICE — PACK PROCEDURE SURG TOTAL KNEE

## (undated) DEVICE — INTENDED FOR TISSUE SEPARATION, AND OTHER PROCEDURES THAT REQUIRE A SHARP SURGICAL BLADE TO PUNCTURE OR CUT.: Brand: BARD-PARKER ® CARBON RIB-BACK BLADES

## (undated) DEVICE — SUTURE VCRL SZ 2-0 L18IN ABSRB UD CT-1 L36MM 1/2 CIR J839D

## (undated) DEVICE — SUTURE MCRYL SZ 4-0 L27IN ABSRB UD L19MM PS-2 1/2 CIR PRIM Y426H

## (undated) DEVICE — SOLUTION IV 100ML 0.9% SOD CHL PH5 CONTAIN DEHP VIAFLX QP

## (undated) DEVICE — TURNOVER KIT RM INF CTRL TECH

## (undated) DEVICE — FLUID TRAP FOR MINIVAC ES EQUIP FLD TRAP

## (undated) DEVICE — 3 BONE CEMENT MIXER: Brand: MIXEVAC

## (undated) DEVICE — JEWISH HOSPITAL TURNOVER KIT: Brand: MEDLINE INDUSTRIES, INC.

## (undated) DEVICE — SYRINGE 60ML IRRIG PISTON TOMEY

## (undated) DEVICE — SUTURE VCRL SZ 0 L18IN ABSRB UD L36MM CT-1 1/2 CIR J840D

## (undated) DEVICE — GARMENT,MEDLINE,DVT,INT,CALF,MED, GEN2: Brand: MEDLINE

## (undated) DEVICE — COUNTER NDL 40 COUNT HLD 70 NUM FOAM BLK SGL MAG W BLDE REMV

## (undated) DEVICE — SPONGE,LAP,18"X18",DLX,XR,ST,5/PK,40/PK: Brand: MEDLINE

## (undated) DEVICE — Z DISCONTINUED PER MEDLINE USE 2741943 DRESSING AQUACEL 10 IN ALG W9XL25CM SIL CVR WTRPRF VIR BACT BARR ANTIMIC

## (undated) DEVICE — VISIONAIRE ADAPTIVE GUIDE KIT                                    JOURNEY II: Brand: VISIONAIRE

## (undated) DEVICE — STRIP,CLOSURE,WOUND,MEDI-STRIP,1/2X4: Brand: MEDLINE

## (undated) DEVICE — HANDPIECE SUCTION TUBING INTERPULSE 10FT

## (undated) DEVICE — SST BUR, WIRE PASS DRILL, 2 FLUTES, MED., 2MM DIA.: Brand: MICROAIRE®

## (undated) DEVICE — NON RIMMED SPEED PIN 65MM STERILE

## (undated) DEVICE — GOWN,SIRUS,POLYRNF,BRTHSLV,XLN/XXL,18/CS: Brand: MEDLINE

## (undated) DEVICE — SUTURE MCRYL SZ 2-0 L18IN ABSRB VLT L36MM CT-1 1/2 CIR Y739D